# Patient Record
Sex: FEMALE | Race: WHITE | Employment: FULL TIME | URBAN - METROPOLITAN AREA
[De-identification: names, ages, dates, MRNs, and addresses within clinical notes are randomized per-mention and may not be internally consistent; named-entity substitution may affect disease eponyms.]

---

## 2017-11-18 PROBLEM — S41.112A LACERATION OF LEFT UPPER EXTREMITY: Status: ACTIVE | Noted: 2017-11-18

## 2017-11-18 PROBLEM — S40.022A CONTUSION OF MULTIPLE SITES OF LEFT SHOULDER AND UPPER ARM: Status: ACTIVE | Noted: 2017-11-18

## 2017-11-18 PROBLEM — S60.00XA CONTUSION OF FINGER OF LEFT HAND: Status: ACTIVE | Noted: 2017-11-18

## 2017-11-18 PROBLEM — S40.012A CONTUSION OF MULTIPLE SITES OF LEFT SHOULDER AND UPPER ARM: Status: ACTIVE | Noted: 2017-11-18

## 2018-08-15 ENCOUNTER — TELEPHONE (OUTPATIENT)
Dept: ORTHOPEDIC SURGERY | Age: 38
End: 2018-08-15

## 2018-08-15 DIAGNOSIS — R52 PAIN: Primary | ICD-10-CM

## 2018-08-16 ENCOUNTER — OFFICE VISIT (OUTPATIENT)
Dept: ORTHOPEDIC SURGERY | Age: 38
End: 2018-08-16
Payer: COMMERCIAL

## 2018-08-16 VITALS
TEMPERATURE: 98.6 F | HEART RATE: 88 BPM | BODY MASS INDEX: 23.04 KG/M2 | SYSTOLIC BLOOD PRESSURE: 123 MMHG | RESPIRATION RATE: 18 BRPM | HEIGHT: 63 IN | WEIGHT: 130 LBS | DIASTOLIC BLOOD PRESSURE: 87 MMHG

## 2018-08-16 DIAGNOSIS — M77.8 RIGHT SHOULDER TENDONITIS: ICD-10-CM

## 2018-08-16 DIAGNOSIS — G56.02 LEFT CARPAL TUNNEL SYNDROME: ICD-10-CM

## 2018-08-16 DIAGNOSIS — M67.911 BILATERAL SHOULDER TENDINOPATHY: ICD-10-CM

## 2018-08-16 DIAGNOSIS — R52 PAIN: Primary | ICD-10-CM

## 2018-08-16 DIAGNOSIS — M77.12 LATERAL EPICONDYLITIS OF LEFT ELBOW: ICD-10-CM

## 2018-08-16 DIAGNOSIS — G56.23 ULNAR NEUROPATHY OF BOTH UPPER EXTREMITIES: ICD-10-CM

## 2018-08-16 DIAGNOSIS — M67.912 BILATERAL SHOULDER TENDINOPATHY: ICD-10-CM

## 2018-08-16 PROCEDURE — 99214 OFFICE O/P EST MOD 30 MIN: CPT | Performed by: ORTHOPAEDIC SURGERY

## 2018-08-16 PROCEDURE — G8420 CALC BMI NORM PARAMETERS: HCPCS | Performed by: ORTHOPAEDIC SURGERY

## 2018-08-16 PROCEDURE — G8427 DOCREV CUR MEDS BY ELIG CLIN: HCPCS | Performed by: ORTHOPAEDIC SURGERY

## 2018-08-16 PROCEDURE — L3908 WHO COCK-UP NONMOLDE PRE OTS: HCPCS | Performed by: ORTHOPAEDIC SURGERY

## 2018-08-16 PROCEDURE — 4004F PT TOBACCO SCREEN RCVD TLK: CPT | Performed by: ORTHOPAEDIC SURGERY

## 2018-08-16 PROCEDURE — 20610 DRAIN/INJ JOINT/BURSA W/O US: CPT | Performed by: ORTHOPAEDIC SURGERY

## 2018-08-16 RX ORDER — TRIAMCINOLONE ACETONIDE 40 MG/ML
80 INJECTION, SUSPENSION INTRA-ARTICULAR; INTRAMUSCULAR ONCE
Status: COMPLETED | OUTPATIENT
Start: 2018-08-16 | End: 2018-08-16

## 2018-08-16 RX ORDER — LAMOTRIGINE 150 MG/1
150 TABLET ORAL DAILY
COMMUNITY
End: 2020-06-15

## 2018-08-16 RX ORDER — IBUPROFEN 800 MG/1
800 TABLET ORAL EVERY 6 HOURS PRN
Qty: 120 TABLET | Refills: 1 | Status: SHIPPED
Start: 2018-08-16 | End: 2020-07-21 | Stop reason: ALTCHOICE

## 2018-08-16 RX ORDER — IBUPROFEN 800 MG/1
800 TABLET ORAL EVERY 8 HOURS PRN
Qty: 21 TABLET | Refills: 0 | Status: SHIPPED | OUTPATIENT
Start: 2018-08-16 | End: 2018-08-16 | Stop reason: ALTCHOICE

## 2018-08-16 RX ADMIN — TRIAMCINOLONE ACETONIDE 80 MG: 40 INJECTION, SUSPENSION INTRA-ARTICULAR; INTRAMUSCULAR at 09:51

## 2018-08-16 NOTE — PROGRESS NOTES
she has been smoking Cigarettes. She has a 2.50 pack-year smoking history. She has never used smokeless tobacco.  ETOH:   reports that she does not drink alcohol. DRUGS:   reports that she does not use drugs. ACTIVITIES OF DAILY LIVING:    OCCUPATION:    Family History:   History reviewed. No pertinent family history. REVIEW OF SYSTEMS:  CONSTITUTIONAL:  negative  EYES:  negative  HEENT:  negative  RESPIRATORY:  negative  CARDIOVASCULAR:  negative  GASTROINTESTINAL:  negative  GENITOURINARY:  negative  INTEGUMENT/BREAST:  negative  HEMATOLOGIC/LYMPHATIC:  negative  ALLERGIC/IMMUNOLOGIC:  negative  ENDOCRINE:  negative  MUSCULOSKELETAL:  negative  NEUROLOGICAL:  negative  BEHAVIOR/PSYCH:  negative    PHYSICAL EXAM:    VITALS:  /87 (Site: Left Arm, Position: Sitting)   Pulse 88   Temp 98.6 °F (37 °C) (Oral)   Resp 18   Ht 5' 3\" (1.6 m)   Wt 130 lb (59 kg)   BMI 23.03 kg/m²   CONSTITUTIONAL:  awake, alert, cooperative, no apparent distress, and appears stated age  EYES:  Lids and lashes normal, pupils equal, round and reactive to light, extra ocular muscles intact, sclera clear, conjunctiva normal  ENT:  Normocephalic, without obvious abnormality, atraumatic, sinuses nontender on palpation, external ears without lesions, oral pharynx with moist mucus membranes, tonsils without erythema or exudates, gums normal and good dentition. NECK:  Supple, symmetrical, trachea midline, no adenopathy, thyroid symmetric, not enlarged and no tenderness, skin normal  NEUROLOGIC:  Awake, alert, oriented to name, place and time. Cranial nerves II-XII are grossly intact. Motor is 5 out of 5 bilaterally. Sensory is intact. gait is normal.  MUSCULOSKELETAL:    Right upper extremity: Well-healed scars of the shoulder. Forward elevation 140°. Abduction 100°. External rotation 30°. Internal rotation to the lower lumbar spine. Positive tenderness over the anterior aspect shoulder. Mild crepitance anteriorly.  Well-healed in the office. AP, lateral, obliques were obtained. Negative for acute fracture dislocations. Soft tissues within normal limits. Impression office x-rays bilateral hands: Negative for acute fracture dislocations. IMPRESSION:  · Persistent and recalcitrant right shoulder pain worsening recently with stiffness status post rotator cuff repair ×2 and manipulation shoulder  · Persistent bilateral hand numbness and tingling system with ulnar neuropathy bilateral as well as probable left carpal tunnel syndrome  · Left elbow lateral Epicondylitis    PLAN:  Today's findings were explained to patient. She consented to injection of the right shoulder which is well-tolerated. She is also referred to therapy for the shoulder and left elbow. Cockup wrist brace was fitted. Repeat EMG nerve nurses reported the bilateral upper shoulders. Procedure Note Cortisone Injection to Shoulder    The right shoulder was identified as the injection site. The risk and benefits of a cortisone injection were explained and the patient consented to the injection. Under sterile conditions, the shoulder SAS was injected with a mixture of 80mg of Kenelog and 4 mL of 5% Ropivacaine and 4 mL of 1% Lidocaine without complication. A sterile bandage was applied.

## 2018-08-20 ENCOUNTER — HOSPITAL ENCOUNTER (OUTPATIENT)
Dept: PHYSICAL THERAPY | Age: 38
Setting detail: THERAPIES SERIES
Discharge: HOME | End: 2018-08-20
Payer: COMMERCIAL

## 2018-08-20 PROCEDURE — G8981 BODY POS CURRENT STATUS: HCPCS

## 2018-08-20 PROCEDURE — G8982 BODY POS GOAL STATUS: HCPCS

## 2018-08-20 PROCEDURE — 97162 PT EVAL MOD COMPLEX 30 MIN: CPT

## 2018-08-23 ENCOUNTER — HOSPITAL ENCOUNTER (OUTPATIENT)
Dept: PHYSICAL THERAPY | Age: 38
Setting detail: THERAPIES SERIES
Discharge: HOME | End: 2018-08-23
Payer: COMMERCIAL

## 2018-08-23 PROCEDURE — G0283 ELEC STIM OTHER THAN WOUND: HCPCS

## 2018-08-23 PROCEDURE — 97140 MANUAL THERAPY 1/> REGIONS: CPT

## 2018-08-27 NOTE — FLOWSHEET NOTE
[facility]  Phone: [phone] Fax: [phone]     Physical Therapy  Out Patient Initial Evaluation    Date:  2018     Patient Name:  Lianet Ceron    :  1980  MRN: [id number]    DIAGNOSIS:  Pain and Limited ROM Bilateral shoulders and left Elbow  EVALUATION DATE:  2018  REFERRING PHYSICIAN:  Dr Sosa Shamar:  2018     PROBLEMS FOUND DURING EVALUATION  · Pain: affects bilateral shoulders and left elbow. Pain intensity varies Left shoulder pain most intense and most frequent   · Altered sensation left UE  · Limited ROM/Mobility bilateral shoulder/scapular regions and cervical region  · Postural deficits/muscular imbalances     SHORT TERM GOALS  · Patient will report a consistent and sustained reduction in overall shoulder/elbow pain symptoms and will demonstrate improved ROM   · Establish HEP    LONG TERM GOALS  · Patient will be able to engage in normal ADL's while being able to effectively control shoulder/elbow pain at 3/10 or less  · Left UE Altered sensation will be minimal and will not affect ADL's   · AROM bilateral shoulder/scapular region will be WFL's   · Postural awareness Fair+ or better Muscular imbalances Minimal  · Patient will be able to maintain and self direct an appropriate follow up independent exercise program     PATIENT GOALS  · Regain functional mobility with effective pain control     REHAB POTENTIAL:  Fair/Good    FREQUENCY/DURATION:  2-3 times per week 5 weeks     PLAN OF CARE:  Modalities Manual therapy ROM Strengthening Postural education HEP       Thank you for the opportunity to work with your patient. If you have questions or comments, please feel free to contact me by phone or fax.       Electronically Signed by Leatha Atwood PT [id number]        ___________________________________  2018     Physician     Date

## 2018-08-28 ENCOUNTER — HOSPITAL ENCOUNTER (OUTPATIENT)
Dept: PHYSICAL THERAPY | Age: 38
Setting detail: THERAPIES SERIES
Discharge: HOME | End: 2018-08-28
Payer: COMMERCIAL

## 2018-08-28 PROCEDURE — 97110 THERAPEUTIC EXERCISES: CPT

## 2018-08-28 PROCEDURE — 97140 MANUAL THERAPY 1/> REGIONS: CPT

## 2018-08-28 NOTE — PROGRESS NOTES
[facility]  Phone: [phone] Fax: [phone]       Physical Therapy Daily Treatment Note  Date:  2018    Patient Name:  Patrick Allan    :  1980  MRN: [id number]    Restrictions/Precautions:    Diagnosis:  Bilateral Shoulder/Left Elbow pain   Treatment Diagnosis:    Insurance/Certification information:  [facility]   Referring Physician:  Dr Isaura Espinoza of [facility] signed (Y/N):    Visit# / total visits:  3/  Pain level: /10  Time In: 0800       Time Out: 08         Subjective:  Patient reports she has felt less of the acute tightness/restriction through the posterior cervical region and left posterior shoulder but that she still notes significant discomfort with ROM     Exercises:  Exercise/Equipment Resistance/Repetitions Other comments     UBE 6 min      Flex Band Self mob  10 reps bilateral      Doorway stretch   5 reps 2 sets      Wand stretches   10 reps                                                                                                                   Other Therapeutic Activities:      Home Exercise Program:      Manual Treatments:  Passive ROM with soft tissue mob posterior upper trap/scapular region     Modalities:  Interferential Estim left shoulder/scapular region 20 min with heat     Timed Code Treatment Minutes:  30    Total Treatment Minutes:  40    Treatment/Activity Tolerance:  [x] Patient tolerated treatment well [] Patient limited by fatigue  [] Patient limited by pain  [] Patient limited by other medical complications  [x] Other: Patient exhibited less guarding and tension in the left shoulder and cervical areas today but overall discomfort was somewhat increased with ROM     Prognosis: [] Good [x] Fair  [] Poor    Patient Requires Follow-up: [x] Yes  [] No    Plan:   [x] Continue per plan of care [] Alter current plan (see comments)  [] Plan of care initiated [] Hold pending MD visit [] Discharge  Plan for Next Session: Electronically signed by:  Brielle Pardo, [address] [facility]

## 2018-08-30 ENCOUNTER — HOSPITAL ENCOUNTER (OUTPATIENT)
Dept: PHYSICAL THERAPY | Age: 38
Setting detail: THERAPIES SERIES
Discharge: HOME | End: 2018-08-30
Payer: COMMERCIAL

## 2018-08-30 PROCEDURE — 97110 THERAPEUTIC EXERCISES: CPT

## 2018-09-04 ENCOUNTER — HOSPITAL ENCOUNTER (OUTPATIENT)
Dept: PHYSICAL THERAPY | Age: 38
Setting detail: THERAPIES SERIES
Discharge: HOME | End: 2018-09-04
Payer: COMMERCIAL

## 2018-09-04 PROCEDURE — 97110 THERAPEUTIC EXERCISES: CPT

## 2018-09-04 NOTE — PROGRESS NOTES
[facility]  Phone: [phone] Fax: [phone]       Physical Therapy Daily Treatment Note  Date:  2018    Patient Name:  Debbie Bedolla    :  1980  MRN: [id number]    Restrictions/Precautions:    Diagnosis:  Bilateral Shoulder/Left Elbow pain   Treatment Diagnosis:    Insurance/Certification information:  [facility]   Referring Physician:  Dr Brown [facility] of care signed (Y/N):    Visit# / total visits:  5/  Pain level: /10  Time In: 0800       Time Out: 08        Subjective:  Patient reports she \"took it easy\" for most of the long weekend and overall pain and stiffness seem improved today     Exercises:  Exercise/Equipment Resistance/Repetitions Other comments     UBE 6 min      Flex Band Self mob  10 reps bilateral      Doorway stretch   5 reps 2 sets      Wand stretches   10 reps                                                                                                                   Other Therapeutic Activities:      Home Exercise Program:      Manual Treatments:  Passive ROM with soft tissue mob posterior upper trap/scapular region     Modalities:       Timed Code Treatment Minutes:  30    Total Treatment Minutes:  40    Treatment/Activity Tolerance:  [x] Patient tolerated treatment well [] Patient limited by fatigue  [] Patient limited by pain  [] Patient limited by other medical complications  [x] Other:General ROM was better today Patient noted her neck, proximal shoulder and [address] region were all more mobile with exercises today     Prognosis: [] Good [x] Fair  [] Poor    Patient Requires Follow-up: [x] Yes  [] No    Plan:   [x] Continue per plan of care [] Alter current plan (see comments)  [] Plan of care initiated [] Hold pending MD visit [] Discharge  Plan for Next Session:         Electronically signed by:  Israel Mccracken, [address] [facility]

## 2018-09-06 ENCOUNTER — HOSPITAL ENCOUNTER (OUTPATIENT)
Dept: PHYSICAL THERAPY | Age: 38
Setting detail: THERAPIES SERIES
Discharge: HOME | End: 2018-09-06
Payer: COMMERCIAL

## 2018-09-06 PROCEDURE — 97110 THERAPEUTIC EXERCISES: CPT

## 2018-09-10 ENCOUNTER — HOSPITAL ENCOUNTER (OUTPATIENT)
Dept: PHYSICAL THERAPY | Age: 38
Setting detail: THERAPIES SERIES
Discharge: HOME | End: 2018-09-10
Payer: COMMERCIAL

## 2018-09-10 PROCEDURE — 97110 THERAPEUTIC EXERCISES: CPT

## 2018-09-10 NOTE — PROGRESS NOTES
[facility]  Phone: [phone] Fax: [phone]       Physical Therapy Daily Treatment Note  Date:  9/10/2018    Patient Name:  Valeriy Rogel    :  1980  MRN: [id number]    Restrictions/Precautions:    Diagnosis:  Bilateral Shoulder/Left Elbow pain   Treatment Diagnosis:    Insurance/Certification information:  [facility]   Referring Physician:  Dr Salena Yao of care signed (Y/N):    Visit# / total visits:  7/  Pain level: /10  Time In: 0755       Time Out: 0840        Subjective:  Patient reports shoulder pain has not increased over the weekend Patient feeling better about self management of symptoms     Exercises:  Exercise/Equipment Resistance/Repetitions Other comments     UBE 6 min      Flex Band Self mob  10 reps bilateral      Doorway stretch   5 reps 2 sets      Wand stretches   10 reps                                                                                                                   Other Therapeutic Activities:      Home Exercise Program:      Manual Treatments:  Passive ROM with soft tissue mob posterior upper trap/scapular region     Modalities:       Timed Code Treatment Minutes:  30    Total Treatment Minutes:  40    Treatment/Activity Tolerance:  [x] Patient tolerated treatment well [] Patient limited by fatigue  [] Patient limited by pain  [] Patient limited by other medical complications  [] Other:    Prognosis: [] Good [x] Fair  [] Poor    Patient Requires Follow-up: [x] Yes  [] No    Plan:   [x] Continue per plan of care [] Alter current plan (see comments)  [] Plan of care initiated [] Hold pending MD visit [] Discharge  Plan for Next Session:         Electronically signed by:  Keith De La O, [address] [place] [address]

## 2018-09-14 ENCOUNTER — HOSPITAL ENCOUNTER (OUTPATIENT)
Dept: PHYSICAL THERAPY | Age: 38
Setting detail: THERAPIES SERIES
Discharge: HOME | End: 2018-09-14
Payer: COMMERCIAL

## 2018-09-14 PROCEDURE — 97110 THERAPEUTIC EXERCISES: CPT

## 2018-09-14 NOTE — PROGRESS NOTES
[facility]  Phone: [phone] Fax: [phone]       Physical Therapy Daily Treatment Note  Date:  2018    Patient Name:  Magdy Mackey    :  1980  MRN: [id number]    Restrictions/Precautions:    Diagnosis:  Bilateral Shoulder/Left Elbow pain   Treatment Diagnosis:    Insurance/Certification information:  [facility]   Referring Physician:  Dr Li Eye of care signed (Y/N):    Visit# / total visits:  8/  Pain level: /10  Time In: 5430       Time Out: 0840        Subjective:      Exercises:  Exercise/Equipment Resistance/Repetitions Other comments     UBE 6 min      Flex Band Self mob  10 reps bilateral      Doorway stretch   5 reps 2 sets      Wand stretches   10 reps                                                                                                                   Other Therapeutic Activities:      Home Exercise Program:      Manual Treatments:  Passive ROM with soft tissue mob posterior upper trap/scapular region     Modalities:       Timed Code Treatment Minutes:  30    Total Treatment Minutes:  40    Treatment/Activity Tolerance:  [x] Patient tolerated treatment well [] Patient limited by fatigue  [] Patient limited by pain  [] Patient limited by other medical complications  [] Other:    Prognosis: [] Good [x] Fair  [] Poor    Patient Requires Follow-up: [x] Yes  [] No    Plan:   [x] Continue per plan of care [] Alter current plan (see comments)  [] Plan of care initiated [] Hold pending MD visit [] Discharge  Plan for Next Session:         Electronically signed by:  Chadd Locke, [address] [place] [address]

## 2018-09-17 ENCOUNTER — HOSPITAL ENCOUNTER (OUTPATIENT)
Dept: PHYSICAL THERAPY | Age: 38
Setting detail: THERAPIES SERIES
Discharge: HOME | End: 2018-09-17
Payer: COMMERCIAL

## 2018-09-17 PROCEDURE — 97110 THERAPEUTIC EXERCISES: CPT

## 2018-09-20 ENCOUNTER — HOSPITAL ENCOUNTER (OUTPATIENT)
Dept: PHYSICAL THERAPY | Age: 38
Setting detail: THERAPIES SERIES
End: 2018-09-20
Payer: COMMERCIAL

## 2018-09-22 ENCOUNTER — HOSPITAL ENCOUNTER (OUTPATIENT)
Dept: PHYSICAL THERAPY | Age: 38
Setting detail: THERAPIES SERIES
Discharge: HOME | End: 2018-09-22
Payer: COMMERCIAL

## 2018-09-22 PROCEDURE — 97110 THERAPEUTIC EXERCISES: CPT

## 2018-09-22 PROCEDURE — G8982 BODY POS GOAL STATUS: HCPCS

## 2018-09-22 PROCEDURE — G8981 BODY POS CURRENT STATUS: HCPCS

## 2018-09-24 ENCOUNTER — HOSPITAL ENCOUNTER (OUTPATIENT)
Dept: PHYSICAL THERAPY | Age: 38
Setting detail: THERAPIES SERIES
Discharge: HOME | End: 2018-09-24
Payer: COMMERCIAL

## 2018-09-24 PROCEDURE — 97110 THERAPEUTIC EXERCISES: CPT

## 2018-09-27 ENCOUNTER — HOSPITAL ENCOUNTER (OUTPATIENT)
Dept: PHYSICAL THERAPY | Age: 38
Setting detail: THERAPIES SERIES
Discharge: HOME | End: 2018-09-27
Payer: COMMERCIAL

## 2019-01-29 ENCOUNTER — HOSPITAL ENCOUNTER (EMERGENCY)
Age: 39
Discharge: HOME | End: 2019-01-30
Attending: EMERGENCY MEDICINE
Payer: COMMERCIAL

## 2019-01-29 ENCOUNTER — APPOINTMENT (OUTPATIENT)
Dept: CT IMAGING | Age: 39
End: 2019-01-29
Payer: COMMERCIAL

## 2019-01-29 DIAGNOSIS — N20.0 NEPHROLITHIASIS: Primary | ICD-10-CM

## 2019-01-29 DIAGNOSIS — R10.9 ABDOMINAL PAIN, UNSPECIFIED ABDOMINAL LOCATION: ICD-10-CM

## 2019-01-29 LAB
ALBUMIN SERPL-MCNC: 4.1 G/DL (ref 3.5–5.2)
ALP BLD-CCNC: 83 U/L (ref 35–104)
ALT SERPL-CCNC: 12 U/L (ref 0–32)
ANION GAP SERPL CALCULATED.3IONS-SCNC: 9 MMOL/L (ref 7–16)
AST SERPL-CCNC: 12 U/L (ref 0–31)
BACTERIA: ABNORMAL /HPF
BASOPHILS ABSOLUTE: 0.03 E9/L (ref 0–0.2)
BASOPHILS RELATIVE PERCENT: 0.4 % (ref 0–2)
BILIRUB SERPL-MCNC: <0.2 MG/DL (ref 0–1.2)
BILIRUBIN URINE: NEGATIVE
BLOOD, URINE: ABNORMAL
BUN BLDV-MCNC: 9 MG/DL (ref 6–20)
CALCIUM SERPL-MCNC: 8.9 MG/DL (ref 8.6–10.2)
CHLORIDE BLD-SCNC: 101 MMOL/L (ref 98–107)
CLARITY: CLEAR
CO2: 28 MMOL/L (ref 22–29)
COLOR: ABNORMAL
CREAT SERPL-MCNC: 0.7 MG/DL (ref 0.5–1)
EOSINOPHILS ABSOLUTE: 0.08 E9/L (ref 0.05–0.5)
EOSINOPHILS RELATIVE PERCENT: 1.1 % (ref 0–6)
GFR AFRICAN AMERICAN: >60
GFR NON-AFRICAN AMERICAN: >60 ML/MIN/1.73
GLUCOSE BLD-MCNC: 74 MG/DL (ref 74–99)
GLUCOSE URINE: NEGATIVE MG/DL
HCG, URINE, POC: NEGATIVE
HCT VFR BLD CALC: 38.3 % (ref 34–48)
HEMOGLOBIN: 12.6 G/DL (ref 11.5–15.5)
IMMATURE GRANULOCYTES #: 0.02 E9/L
IMMATURE GRANULOCYTES %: 0.3 % (ref 0–5)
KETONES, URINE: NEGATIVE MG/DL
LACTIC ACID: 0.8 MMOL/L (ref 0.5–2.2)
LEUKOCYTE ESTERASE, URINE: NEGATIVE
LIPASE: 19 U/L (ref 13–60)
LYMPHOCYTES ABSOLUTE: 3.24 E9/L (ref 1.5–4)
LYMPHOCYTES RELATIVE PERCENT: 46.2 % (ref 20–42)
Lab: NORMAL
MCH RBC QN AUTO: 30 PG (ref 26–35)
MCHC RBC AUTO-ENTMCNC: 32.9 % (ref 32–34.5)
MCV RBC AUTO: 91.2 FL (ref 80–99.9)
MONOCYTES ABSOLUTE: 0.57 E9/L (ref 0.1–0.95)
MONOCYTES RELATIVE PERCENT: 8.1 % (ref 2–12)
NEGATIVE QC PASS/FAIL: NORMAL
NEUTROPHILS ABSOLUTE: 3.07 E9/L (ref 1.8–7.3)
NEUTROPHILS RELATIVE PERCENT: 43.9 % (ref 43–80)
NITRITE, URINE: NEGATIVE
PDW BLD-RTO: 12.8 FL (ref 11.5–15)
PH UA: 6.5 (ref 5–9)
PLATELET # BLD: 325 E9/L (ref 130–450)
PMV BLD AUTO: 10.3 FL (ref 7–12)
POSITIVE QC PASS/FAIL: NORMAL
POTASSIUM SERPL-SCNC: 3.8 MMOL/L (ref 3.5–5)
PROTEIN UA: NEGATIVE MG/DL
RBC # BLD: 4.2 E12/L (ref 3.5–5.5)
RBC UA: ABNORMAL /HPF (ref 0–2)
SODIUM BLD-SCNC: 138 MMOL/L (ref 132–146)
SPECIFIC GRAVITY UA: <=1.005 (ref 1–1.03)
TOTAL PROTEIN: 6.9 G/DL (ref 6.4–8.3)
UROBILINOGEN, URINE: 0.2 E.U./DL
WBC # BLD: 7 E9/L (ref 4.5–11.5)
WBC UA: ABNORMAL /HPF (ref 0–5)

## 2019-01-29 PROCEDURE — 85025 COMPLETE CBC W/AUTO DIFF WBC: CPT

## 2019-01-29 PROCEDURE — 96375 TX/PRO/DX INJ NEW DRUG ADDON: CPT

## 2019-01-29 PROCEDURE — 83605 ASSAY OF LACTIC ACID: CPT

## 2019-01-29 PROCEDURE — 2580000003 HC RX 258: Performed by: EMERGENCY MEDICINE

## 2019-01-29 PROCEDURE — 36415 COLL VENOUS BLD VENIPUNCTURE: CPT

## 2019-01-29 PROCEDURE — 81001 URINALYSIS AUTO W/SCOPE: CPT

## 2019-01-29 PROCEDURE — 87088 URINE BACTERIA CULTURE: CPT

## 2019-01-29 PROCEDURE — 99284 EMERGENCY DEPT VISIT MOD MDM: CPT

## 2019-01-29 PROCEDURE — 6360000002 HC RX W HCPCS: Performed by: EMERGENCY MEDICINE

## 2019-01-29 PROCEDURE — 80053 COMPREHEN METABOLIC PANEL: CPT

## 2019-01-29 PROCEDURE — 74176 CT ABD & PELVIS W/O CONTRAST: CPT

## 2019-01-29 PROCEDURE — 83690 ASSAY OF LIPASE: CPT

## 2019-01-29 PROCEDURE — 96374 THER/PROPH/DIAG INJ IV PUSH: CPT

## 2019-01-29 RX ORDER — METOCLOPRAMIDE HYDROCHLORIDE 5 MG/ML
10 INJECTION INTRAMUSCULAR; INTRAVENOUS ONCE
Status: COMPLETED | OUTPATIENT
Start: 2019-01-29 | End: 2019-01-29

## 2019-01-29 RX ORDER — 0.9 % SODIUM CHLORIDE 0.9 %
500 INTRAVENOUS SOLUTION INTRAVENOUS ONCE
Status: COMPLETED | OUTPATIENT
Start: 2019-01-29 | End: 2019-01-29

## 2019-01-29 RX ORDER — MORPHINE SULFATE 2 MG/ML
4 INJECTION, SOLUTION INTRAMUSCULAR; INTRAVENOUS ONCE
Status: COMPLETED | OUTPATIENT
Start: 2019-01-29 | End: 2019-01-29

## 2019-01-29 RX ADMIN — SODIUM CHLORIDE 500 ML: 9 INJECTION, SOLUTION INTRAVENOUS at 21:59

## 2019-01-29 RX ADMIN — METOCLOPRAMIDE 10 MG: 5 INJECTION, SOLUTION INTRAMUSCULAR; INTRAVENOUS at 21:59

## 2019-01-29 RX ADMIN — MORPHINE SULFATE 4 MG: 2 INJECTION, SOLUTION INTRAMUSCULAR; INTRAVENOUS at 22:00

## 2019-01-29 ASSESSMENT — ENCOUNTER SYMPTOMS
BACK PAIN: 0
NAUSEA: 1
SHORTNESS OF BREATH: 0
RHINORRHEA: 0
CONSTIPATION: 0
EYE PAIN: 0
HEMATEMESIS: 0
SORE THROAT: 0
EYE REDNESS: 0
DIARRHEA: 0
HEMATOCHEZIA: 0
ABDOMINAL DISTENTION: 0
EYE DISCHARGE: 0
COUGH: 0
WHEEZING: 0
VOMITING: 0
BLOOD IN STOOL: 0
SINUS PRESSURE: 0
ABDOMINAL PAIN: 1

## 2019-01-29 ASSESSMENT — PAIN SCALES - GENERAL
PAINLEVEL_OUTOF10: 10
PAINLEVEL_OUTOF10: 10

## 2019-01-30 VITALS
RESPIRATION RATE: 18 BRPM | WEIGHT: 130 LBS | TEMPERATURE: 97.5 F | HEART RATE: 66 BPM | SYSTOLIC BLOOD PRESSURE: 105 MMHG | HEIGHT: 63 IN | DIASTOLIC BLOOD PRESSURE: 57 MMHG | OXYGEN SATURATION: 96 % | BODY MASS INDEX: 23.04 KG/M2

## 2019-01-30 RX ORDER — TAMSULOSIN HYDROCHLORIDE 0.4 MG/1
0.4 CAPSULE ORAL DAILY
Qty: 14 CAPSULE | Refills: 0 | Status: SHIPPED | OUTPATIENT
Start: 2019-01-30 | End: 2020-06-15

## 2019-01-30 RX ORDER — ONDANSETRON 4 MG/1
4 TABLET, FILM COATED ORAL EVERY 8 HOURS PRN
Qty: 20 TABLET | Refills: 0 | Status: SHIPPED | OUTPATIENT
Start: 2019-01-30 | End: 2020-06-15

## 2019-01-30 RX ORDER — HYDROCODONE BITARTRATE AND ACETAMINOPHEN 5; 325 MG/1; MG/1
1 TABLET ORAL EVERY 6 HOURS PRN
Qty: 12 TABLET | Refills: 0 | Status: SHIPPED | OUTPATIENT
Start: 2019-01-30 | End: 2019-02-02

## 2019-02-01 LAB — URINE CULTURE, ROUTINE: NORMAL

## 2019-05-08 ENCOUNTER — APPOINTMENT (OUTPATIENT)
Dept: CT IMAGING | Age: 39
End: 2019-05-08
Payer: COMMERCIAL

## 2019-05-08 ENCOUNTER — HOSPITAL ENCOUNTER (EMERGENCY)
Age: 39
Discharge: HOME | End: 2019-05-08
Attending: EMERGENCY MEDICINE
Payer: COMMERCIAL

## 2019-05-08 VITALS
DIASTOLIC BLOOD PRESSURE: 77 MMHG | OXYGEN SATURATION: 98 % | HEIGHT: 63 IN | RESPIRATION RATE: 16 BRPM | WEIGHT: 130 LBS | SYSTOLIC BLOOD PRESSURE: 136 MMHG | HEART RATE: 70 BPM | BODY MASS INDEX: 23.04 KG/M2 | TEMPERATURE: 98.2 F

## 2019-05-08 DIAGNOSIS — N30.01 ACUTE CYSTITIS WITH HEMATURIA: Primary | ICD-10-CM

## 2019-05-08 DIAGNOSIS — K59.09 OTHER CONSTIPATION: ICD-10-CM

## 2019-05-08 LAB
ALBUMIN SERPL-MCNC: 3.9 G/DL (ref 3.5–5.2)
ALP BLD-CCNC: 53 U/L (ref 35–104)
ALT SERPL-CCNC: 11 U/L (ref 0–32)
ANION GAP SERPL CALCULATED.3IONS-SCNC: 8 MMOL/L (ref 7–16)
AST SERPL-CCNC: 15 U/L (ref 0–31)
BACTERIA: ABNORMAL /HPF
BASOPHILS ABSOLUTE: 0.03 E9/L (ref 0–0.2)
BASOPHILS RELATIVE PERCENT: 0.6 % (ref 0–2)
BILIRUB SERPL-MCNC: 0.2 MG/DL (ref 0–1.2)
BILIRUBIN URINE: NEGATIVE
BLOOD, URINE: ABNORMAL
BUN BLDV-MCNC: 9 MG/DL (ref 6–20)
CALCIUM SERPL-MCNC: 8.4 MG/DL (ref 8.6–10.2)
CHLORIDE BLD-SCNC: 102 MMOL/L (ref 98–107)
CLARITY: ABNORMAL
CO2: 27 MMOL/L (ref 22–29)
COLOR: YELLOW
CREAT SERPL-MCNC: 0.7 MG/DL (ref 0.5–1)
EOSINOPHILS ABSOLUTE: 0.06 E9/L (ref 0.05–0.5)
EOSINOPHILS RELATIVE PERCENT: 1.1 % (ref 0–6)
EPITHELIAL CELLS, UA: ABNORMAL /HPF
GFR AFRICAN AMERICAN: >60
GFR NON-AFRICAN AMERICAN: >60 ML/MIN/1.73
GLUCOSE BLD-MCNC: 84 MG/DL (ref 74–99)
GLUCOSE URINE: NEGATIVE MG/DL
HCG, URINE, POC: NEGATIVE
HCT VFR BLD CALC: 35.7 % (ref 34–48)
HEMOGLOBIN: 11.3 G/DL (ref 11.5–15.5)
IMMATURE GRANULOCYTES #: 0 E9/L
IMMATURE GRANULOCYTES %: 0 % (ref 0–5)
KETONES, URINE: NEGATIVE MG/DL
LEUKOCYTE ESTERASE, URINE: ABNORMAL
LYMPHOCYTES ABSOLUTE: 2.46 E9/L (ref 1.5–4)
LYMPHOCYTES RELATIVE PERCENT: 46.9 % (ref 20–42)
Lab: NORMAL
MCH RBC QN AUTO: 29.1 PG (ref 26–35)
MCHC RBC AUTO-ENTMCNC: 31.7 % (ref 32–34.5)
MCV RBC AUTO: 92 FL (ref 80–99.9)
MONOCYTES ABSOLUTE: 0.48 E9/L (ref 0.1–0.95)
MONOCYTES RELATIVE PERCENT: 9.1 % (ref 2–12)
NEGATIVE QC PASS/FAIL: NORMAL
NEUTROPHILS ABSOLUTE: 2.22 E9/L (ref 1.8–7.3)
NEUTROPHILS RELATIVE PERCENT: 42.3 % (ref 43–80)
NITRITE, URINE: NEGATIVE
PDW BLD-RTO: 12.9 FL (ref 11.5–15)
PH UA: 8 (ref 5–9)
PLATELET # BLD: 232 E9/L (ref 130–450)
PMV BLD AUTO: 11.3 FL (ref 7–12)
POSITIVE QC PASS/FAIL: NORMAL
POTASSIUM REFLEX MAGNESIUM: 3.8 MMOL/L (ref 3.5–5)
PROTEIN UA: ABNORMAL MG/DL
RBC # BLD: 3.88 E12/L (ref 3.5–5.5)
RBC UA: >20 /HPF (ref 0–2)
SODIUM BLD-SCNC: 137 MMOL/L (ref 132–146)
SPECIFIC GRAVITY UA: 1.01 (ref 1–1.03)
TOTAL PROTEIN: 6 G/DL (ref 6.4–8.3)
UROBILINOGEN, URINE: 1 E.U./DL
WBC # BLD: 5.3 E9/L (ref 4.5–11.5)
WBC UA: >20 /HPF (ref 0–5)

## 2019-05-08 PROCEDURE — 80053 COMPREHEN METABOLIC PANEL: CPT

## 2019-05-08 PROCEDURE — 36415 COLL VENOUS BLD VENIPUNCTURE: CPT

## 2019-05-08 PROCEDURE — 74176 CT ABD & PELVIS W/O CONTRAST: CPT

## 2019-05-08 PROCEDURE — 99284 EMERGENCY DEPT VISIT MOD MDM: CPT

## 2019-05-08 PROCEDURE — 85025 COMPLETE CBC W/AUTO DIFF WBC: CPT

## 2019-05-08 PROCEDURE — 6370000000 HC RX 637 (ALT 250 FOR IP): Performed by: STUDENT IN AN ORGANIZED HEALTH CARE EDUCATION/TRAINING PROGRAM

## 2019-05-08 PROCEDURE — 2580000003 HC RX 258: Performed by: STUDENT IN AN ORGANIZED HEALTH CARE EDUCATION/TRAINING PROGRAM

## 2019-05-08 PROCEDURE — 6360000002 HC RX W HCPCS: Performed by: STUDENT IN AN ORGANIZED HEALTH CARE EDUCATION/TRAINING PROGRAM

## 2019-05-08 PROCEDURE — 96374 THER/PROPH/DIAG INJ IV PUSH: CPT

## 2019-05-08 PROCEDURE — 81001 URINALYSIS AUTO W/SCOPE: CPT

## 2019-05-08 PROCEDURE — 96375 TX/PRO/DX INJ NEW DRUG ADDON: CPT

## 2019-05-08 RX ORDER — CEFDINIR 300 MG/1
300 CAPSULE ORAL EVERY 12 HOURS SCHEDULED
Status: DISCONTINUED | OUTPATIENT
Start: 2019-05-08 | End: 2019-05-08

## 2019-05-08 RX ORDER — KETOROLAC TROMETHAMINE 30 MG/ML
15 INJECTION, SOLUTION INTRAMUSCULAR; INTRAVENOUS ONCE
Status: COMPLETED | OUTPATIENT
Start: 2019-05-08 | End: 2019-05-08

## 2019-05-08 RX ORDER — CEFDINIR 300 MG/1
300 CAPSULE ORAL 2 TIMES DAILY
Qty: 14 CAPSULE | Refills: 0 | Status: SHIPPED | OUTPATIENT
Start: 2019-05-08 | End: 2019-05-15

## 2019-05-08 RX ORDER — ONDANSETRON 2 MG/ML
4 INJECTION INTRAMUSCULAR; INTRAVENOUS ONCE
Status: COMPLETED | OUTPATIENT
Start: 2019-05-08 | End: 2019-05-08

## 2019-05-08 RX ORDER — 0.9 % SODIUM CHLORIDE 0.9 %
1000 INTRAVENOUS SOLUTION INTRAVENOUS ONCE
Status: COMPLETED | OUTPATIENT
Start: 2019-05-08 | End: 2019-05-08

## 2019-05-08 RX ORDER — HYDROCODONE BITARTRATE AND ACETAMINOPHEN 5; 325 MG/1; MG/1
1 TABLET ORAL EVERY 6 HOURS PRN
Qty: 12 TABLET | Refills: 0 | Status: SHIPPED | OUTPATIENT
Start: 2019-05-08 | End: 2019-05-08

## 2019-05-08 RX ORDER — CEFDINIR 300 MG/1
300 CAPSULE ORAL ONCE
Status: COMPLETED | OUTPATIENT
Start: 2019-05-08 | End: 2019-05-08

## 2019-05-08 RX ORDER — NAPROXEN 375 MG/1
375 TABLET ORAL 2 TIMES DAILY WITH MEALS
Qty: 12 TABLET | Refills: 0 | Status: SHIPPED | OUTPATIENT
Start: 2019-05-08 | End: 2020-06-15

## 2019-05-08 RX ADMIN — KETOROLAC TROMETHAMINE 15 MG: 30 INJECTION INTRAMUSCULAR; INTRAVENOUS at 05:19

## 2019-05-08 RX ADMIN — ONDANSETRON 4 MG: 2 INJECTION INTRAMUSCULAR; INTRAVENOUS at 05:19

## 2019-05-08 RX ADMIN — SODIUM CHLORIDE 1000 ML: 9 INJECTION, SOLUTION INTRAVENOUS at 05:19

## 2019-05-08 RX ADMIN — CEFDINIR 300 MG: 300 CAPSULE ORAL at 06:42

## 2019-05-08 ASSESSMENT — PAIN SCALES - GENERAL
PAINLEVEL_OUTOF10: 9
PAINLEVEL_OUTOF10: 9

## 2019-05-08 ASSESSMENT — ENCOUNTER SYMPTOMS
NAUSEA: 1
SHORTNESS OF BREATH: 0
ABDOMINAL PAIN: 1
DIARRHEA: 0
BACK PAIN: 0
VOMITING: 0

## 2019-05-08 ASSESSMENT — PAIN DESCRIPTION - PAIN TYPE: TYPE: ACUTE PAIN

## 2019-05-08 NOTE — ED PROVIDER NOTES
Milton Zee is a 42year old female who presents from home by private car for LLQ abdominal pain. Pain started suddenly, woke her from sleep, and radiates to her groin on the left side. Patient has a history of kidney stones. Patient's LMP was 4/22. Patient is not having any vaginal discharge or bleeding. Patient was unable to follow up for previous kidney stone due to daughter's illness. Patient is no having any diarrhea, fever, or chills. Patient has no history of previous surgery. The history is provided by the patient. Abdominal Pain   Pain location:  LLQ  Pain quality: sharp and shooting    Pain severity:  Severe  Onset quality:  Sudden  Duration:  2 hours  Timing:  Constant  Progression:  Worsening  Context: awakening from sleep    Context: not diet changes, not retching, not sick contacts and not trauma    Relieved by:  Nothing  Worsened by:  Nothing  Ineffective treatments:  None tried  Associated symptoms: nausea    Associated symptoms: no chest pain, no chills, no diarrhea, no dysuria, no fever, no hematuria, no shortness of breath, no vaginal bleeding, no vaginal discharge and no vomiting        Review of Systems   Constitutional: Negative for chills and fever. Respiratory: Negative for shortness of breath. Cardiovascular: Negative for chest pain. Gastrointestinal: Positive for abdominal pain and nausea. Negative for diarrhea and vomiting. Genitourinary: Negative for dysuria, hematuria, vaginal bleeding and vaginal discharge. Musculoskeletal: Negative for back pain. Neurological: Negative for headaches. Physical Exam   Constitutional: She is oriented to person, place, and time. She appears well-developed and well-nourished. HENT:   Head: Normocephalic and atraumatic. Eyes: Conjunctivae and EOM are normal. Right eye exhibits no discharge. Left eye exhibits no discharge. Cardiovascular: Normal rate and regular rhythm.    Pulmonary/Chest: Effort normal and breath sounds not on file. The patients home medications have been reviewed. Allergies: Patient has no known allergies.     -------------------------------------------------- RESULTS -------------------------------------------------  Labs:  Results for orders placed or performed during the hospital encounter of 05/08/19   CBC auto differential   Result Value Ref Range    WBC 5.3 4.5 - 11.5 E9/L    RBC 3.88 3.50 - 5.50 E12/L    Hemoglobin 11.3 (L) 11.5 - 15.5 g/dL    Hematocrit 35.7 34.0 - 48.0 %    MCV 92.0 80.0 - 99.9 fL    MCH 29.1 26.0 - 35.0 pg    MCHC 31.7 (L) 32.0 - 34.5 %    RDW 12.9 11.5 - 15.0 fL    Platelets 603 570 - 590 E9/L    MPV 11.3 7.0 - 12.0 fL    Neutrophils % 42.3 (L) 43.0 - 80.0 %    Immature Granulocytes % 0.0 0.0 - 5.0 %    Lymphocytes % 46.9 (H) 20.0 - 42.0 %    Monocytes % 9.1 2.0 - 12.0 %    Eosinophils % 1.1 0.0 - 6.0 %    Basophils % 0.6 0.0 - 2.0 %    Neutrophils # 2.22 1.80 - 7.30 E9/L    Immature Granulocytes # 0.00 E9/L    Lymphocytes # 2.46 1.50 - 4.00 E9/L    Monocytes # 0.48 0.10 - 0.95 E9/L    Eosinophils # 0.06 0.05 - 0.50 E9/L    Basophils # 0.03 0.00 - 0.20 E9/L   Comprehensive Metabolic Panel w/ Reflex to MG   Result Value Ref Range    Sodium 137 132 - 146 mmol/L    Potassium reflex Magnesium 3.8 3.5 - 5.0 mmol/L    Chloride 102 98 - 107 mmol/L    CO2 27 22 - 29 mmol/L    Anion Gap 8 7 - 16 mmol/L    Glucose 84 74 - 99 mg/dL    BUN 9 6 - 20 mg/dL    CREATININE 0.7 0.5 - 1.0 mg/dL    GFR Non-African American >60 >=60 mL/min/1.73    GFR African American >60     Calcium 8.4 (L) 8.6 - 10.2 mg/dL    Total Protein 6.0 (L) 6.4 - 8.3 g/dL    Alb 3.9 3.5 - 5.2 g/dL    Total Bilirubin 0.2 0.0 - 1.2 mg/dL    Alkaline Phosphatase 53 35 - 104 U/L    ALT 11 0 - 32 U/L    AST 15 0 - 31 U/L   Urinalysis with Microscopic   Result Value Ref Range    Color, UA Yellow Straw/Yellow    Clarity, UA CLOUDY (A) Clear    Glucose, Ur Negative Negative mg/dL    Bilirubin Urine Negative Negative    Ketones, Urine Negative Negative mg/dL    Specific Gravity, UA 1.015 1.005 - 1.030    Blood, Urine LARGE (A) Negative    pH, UA 8.0 5.0 - 9.0    Protein, UA TRACE Negative mg/dL    Urobilinogen, Urine 1.0 <2.0 E.U./dL    Nitrite, Urine Negative Negative    Leukocyte Esterase, Urine MODERATE (A) Negative    WBC, UA >20 0 - 5 /HPF    RBC, UA >20 0 - 2 /HPF    Epi Cells MODERATE /HPF    Bacteria, UA MODERATE (A) /HPF   POC Pregnancy Urine Qual   Result Value Ref Range    HCG, Urine, POC Negative Negative    Lot Number [id number]     Positive QC Pass/Fail Pass     Negative QC Pass/Fail Pass        Radiology:  CT ABDOMEN PELVIS WO CONTRAST   Final Result   1. Moderate stool throughout the colon without mucosal thickening concerning    for constipation. 2. No nephrolithiasis or obstructive uropathy. No perinephric stranding. 3. Normal appendix. 4. Additional nonacute/incidental findings as described above. This report has been electronically signed by Tamela Matt MD.          ------------------------- NURSING NOTES AND VITALS REVIEWED ---------------------------  Date / Time Roomed:  5/8/2019  4:50 AM  ED Bed Assignment:  25/25    The nursing notes within the ED encounter and vital signs as below have been reviewed. BP (!) 136/98   Pulse 74   Temp 98.2 °F (36.8 °C) (Oral)   Resp 16   Ht 5' 3\" (1.6 m)   Wt 130 lb (59 kg)   LMP 04/22/2019   SpO2 99%   BMI 23.03 kg/m²   Oxygen Saturation Interpretation: Normal      ------------------------------------------ PROGRESS NOTES ------------------------------------------  6:28 AM  I have spoken with the patient and discussed todays results, in addition to providing specific details for the plan of care and counseling regarding the diagnosis and prognosis. Their questions are answered at this time and they are agreeable with the plan. I discussed at length with them reasons for immediate return here for re evaluation.  They will followup with their and the on call primary care physician, general surgeon and orthopedic physician by calling their office tomorrow and on Monday.      --------------------------------- ADDITIONAL PROVIDER NOTES ---------------------------------  At this time the patient is without objective evidence of an acute process requiring hospitalization or inpatient management. They have remained hemodynamically stable throughout their entire ED visit and are stable for discharge with outpatient follow-up. The plan has been discussed in detail and they are aware of the specific conditions for emergent return, as well as the importance of follow-up. New Prescriptions    CEFDINIR (OMNICEF) 300 MG CAPSULE    Take 1 capsule by mouth 2 times daily for 7 days    NAPROXEN (NAPROSYN) 375 MG TABLET    Take 1 tablet by mouth 2 times daily (with meals)       Diagnosis:  1. Acute cystitis with hematuria    2. Other constipation        Disposition:  Patient's disposition: Discharge to home  Patient's condition is stable.               Tori Cordoba MD  05/08/19 [id number]

## 2019-07-14 ENCOUNTER — HOSPITAL (OUTPATIENT)
Dept: OTHER | Age: 39
End: 2019-07-14

## 2019-07-14 PROCEDURE — 99283 EMERGENCY DEPT VISIT LOW MDM: CPT | Performed by: NURSE PRACTITIONER

## 2020-01-08 ENCOUNTER — HOSPITAL ENCOUNTER (EMERGENCY)
Age: 40
Discharge: HOME | End: 2020-01-08
Payer: COMMERCIAL

## 2020-01-08 VITALS
TEMPERATURE: 98 F | WEIGHT: 140 LBS | DIASTOLIC BLOOD PRESSURE: 80 MMHG | OXYGEN SATURATION: 98 % | RESPIRATION RATE: 20 BRPM | HEART RATE: 70 BPM | BODY MASS INDEX: 24.8 KG/M2 | HEIGHT: 63 IN | SYSTOLIC BLOOD PRESSURE: 120 MMHG

## 2020-01-08 LAB — STREP GRP A PCR: NEGATIVE

## 2020-01-08 PROCEDURE — 87880 STREP A ASSAY W/OPTIC: CPT

## 2020-01-08 PROCEDURE — 99282 EMERGENCY DEPT VISIT SF MDM: CPT

## 2020-01-08 ASSESSMENT — PAIN DESCRIPTION - LOCATION: LOCATION: MOUTH

## 2020-01-08 ASSESSMENT — PAIN DESCRIPTION - DESCRIPTORS: DESCRIPTORS: SHARP

## 2020-01-08 ASSESSMENT — PAIN DESCRIPTION - PAIN TYPE: TYPE: ACUTE PAIN

## 2020-01-08 ASSESSMENT — PAIN SCALES - GENERAL: PAINLEVEL_OUTOF10: 8

## 2020-01-08 NOTE — ED PROVIDER NOTES
rebound or guarding  Extremities: Moves all extremities x 4. Warm and well perfused  Skin: warm and dry without rash  Neurologic: GCS 15,  Intact. No focal deficits  Psych: Normal Affect      ------------------------ ED COURSE/MEDICAL DECISION MAKING----------------------  Medications - No data to display      Medical Decision Making:    I really don't see any lesions today. Mild posterior pharynx erythema. Rapid strep negative. I did review all her pictures from past that she took at home. Some of them look like thrush. Plan Nystatin for home. F/U dentist or oral surgery. No evidence of obstruction or respiratory issues. I think most of those complaints are related to her anxiety       Counseling: The emergency provider has spoken with the patient and discussed todays results, in addition to providing specific details for the plan of care and counseling regarding the diagnosis and prognosis. Questions are answered at this time and they are agreeable with the plan.      ------------------------ IMPRESSION AND DISPOSITION -------------------------------    IMPRESSION  1.  Oral mucosal lesion        DISPOSITION  Disposition: Discharge to home  Patient condition is stable                   Chandler Valenzuela PA-C  01/08/20 [id number]       Chandler Valenzuela PA-C  01/08/20 1936

## 2020-06-04 ENCOUNTER — NURSE TRIAGE (OUTPATIENT)
Dept: OTHER | Facility: CLINIC | Age: 40
End: 2020-06-04

## 2020-06-04 ENCOUNTER — TELEPHONE (OUTPATIENT)
Dept: ADMINISTRATIVE | Age: 40
End: 2020-06-04

## 2020-06-04 NOTE — TELEPHONE ENCOUNTER
Nurse triage called pre service because pt has had pain and swelling in both hands and feet x 2 months. Pt hung up before nurse could transfer her to me. I tried to call pt to schedule appt, but got no answer.
communication should be directly with the patient.

## 2020-06-18 ENCOUNTER — APPOINTMENT (OUTPATIENT)
Dept: GENERAL RADIOLOGY | Age: 40
End: 2020-06-18
Payer: COMMERCIAL

## 2020-06-18 ENCOUNTER — HOSPITAL ENCOUNTER (EMERGENCY)
Age: 40
Discharge: HOME | End: 2020-06-18
Payer: COMMERCIAL

## 2020-06-18 VITALS
RESPIRATION RATE: 16 BRPM | BODY MASS INDEX: 24.8 KG/M2 | DIASTOLIC BLOOD PRESSURE: 80 MMHG | WEIGHT: 140 LBS | SYSTOLIC BLOOD PRESSURE: 128 MMHG | HEIGHT: 63 IN | OXYGEN SATURATION: 99 % | HEART RATE: 98 BPM | TEMPERATURE: 98.6 F

## 2020-06-18 PROCEDURE — 96372 THER/PROPH/DIAG INJ SC/IM: CPT

## 2020-06-18 PROCEDURE — 6360000002 HC RX W HCPCS: Performed by: NURSE PRACTITIONER

## 2020-06-18 PROCEDURE — 73564 X-RAY EXAM KNEE 4 OR MORE: CPT

## 2020-06-18 PROCEDURE — 99284 EMERGENCY DEPT VISIT MOD MDM: CPT

## 2020-06-18 RX ORDER — KETOROLAC TROMETHAMINE 30 MG/ML
30 INJECTION, SOLUTION INTRAMUSCULAR; INTRAVENOUS ONCE
Status: COMPLETED | OUTPATIENT
Start: 2020-06-18 | End: 2020-06-18

## 2020-06-18 RX ADMIN — KETOROLAC TROMETHAMINE 30 MG: 30 INJECTION, SOLUTION INTRAMUSCULAR at 11:07

## 2020-06-18 ASSESSMENT — PAIN SCALES - GENERAL
PAINLEVEL_OUTOF10: 10
PAINLEVEL_OUTOF10: 10

## 2020-06-18 ASSESSMENT — PAIN DESCRIPTION - ORIENTATION: ORIENTATION: LEFT

## 2020-06-18 ASSESSMENT — PAIN DESCRIPTION - LOCATION: LOCATION: KNEE;LEG

## 2020-06-18 ASSESSMENT — PAIN DESCRIPTION - PAIN TYPE: TYPE: ACUTE PAIN

## 2020-06-18 NOTE — ED PROVIDER NOTES
MANIPULATION & ARTHROSCOPY    ROTATOR CUFF REPAIR  6/10/15    right rotator cuff open revision    ROTATOR CUFF REPAIR  4/2015   Social History:  reports that she has been smoking cigarettes. She has a 2.50 pack-year smoking history. She has never used smokeless tobacco. She reports that she does not drink alcohol or use drugs. Family History: family history is not on file. Allergies: Patient has no known allergies. Physical Exam          ED Triage Vitals   BP Temp Temp Source Pulse Resp SpO2 Height Weight   06/18/20 1046 06/18/20 1015 06/18/20 1047 06/18/20 1015 06/18/20 1015 06/18/20 1015 06/18/20 1030 06/18/20 1030   128/80 98.6 °F (37 °C) Oral 100 16 98 % 5' 3\" (1.6 m) 140 lb (63.5 kg)       Oxygen Saturation Interpretation: Normal.    Constitutional:  Alert, development consistent with age. Neck:  Normal ROM. Supple. Knee:  Left lateral:             Tenderness: Moderate to the lateral aspect. No  Calf tenderness. Swelling/Effusion: None. Deformity: no.              ROM: diminished range with pain. Skin:  no erythema, rash or wounds noted. Drawer's:  Unable to Assess due to pain. Lachman's: Unable to Assess. Apley's: Unable to Assess. Rodo's: Unable to Assess. Valgus/Varus Stress: Unable to Assess. Crepitus: Unable to Assess. Hip:            Tenderness:  none. Swelling: None. Deformity: no.              ROM: full range of motion. Skin:  no erythema, rash or wounds noted. Joint(s) Below: ankle. Tenderness:  none. Swelling: No.              Deformity: no.             ROM: full range of motion. Skin:  no erythema, rash or wounds noted. Neurovascular: Motor deficit: limited due to pain refusing to move the knee. Sensory deficit: none; full sensation intact to light touch in distal toes.              Pulse deficit: none; 2+ pedal and posterior tibial pulses intact. Capillary refill: normal; capillary refill less than 3 seconds in distal toes. Gait:  limp. Lymphatics: No lymphangitis or adenopathy noted. Neurological:  Oriented. Motor functions intact. Lab / Imaging Results   (All laboratory and radiology results have been personally reviewed by myself)  Labs:  No results found for this visit on 06/18/20. Imaging: All Radiology results interpreted by Radiologist unless otherwise noted. XR KNEE LEFT (MIN 4 VIEWS)    (Results Pending)     ED Course / Medical Decision Making     Medications   ketorolac (TORADOL) injection 30 mg (30 mg Intramuscular Given 6/18/20 1107)       Re evaluation: 2813 Discussed xray and follow up and pain did improve mildly. Consult(s):   None    Procedure(s):   none. Medical Decision Making:    Films were obtained based on negative suspicion for bony injury as per history/physical findings . Plan is subsequently for symptom control, limited use and  immobilization with appropriate outpatient follow-up her PCP and orthopedic surgeon whoever she can get into first she may need an MRI to determine if she has any ligament injury she will be given crutches instructed on rest ice elevation compression she did not want a knee brace due to increased pain with straightening. Advised on signs and symptoms warranting immediate return she will be given a short course of NSAIDs for discharge. Counseling: The emergency provider has spoken with the patient and discussed todays results, in addition to providing specific details for the plan of care and counseling regarding the diagnosis and prognosis. Questions are answered at this time and they are agreeable with the plan. Assessment      1. Sprain of left knee, unspecified ligament, initial encounter    2.  Fall on steps, initial encounter      Plan   Discharge to home  Patient condition is good    New Medications

## 2020-06-19 NOTE — ED NOTES
ATTENDING PROVIDER ATTESTATION:     Supervising Physician, on-site, available for consultation, non-participatory in the evaluation or care of this patient     Cooper Hobson DO  06/19/20 1881

## 2020-06-24 ENCOUNTER — OFFICE VISIT (OUTPATIENT)
Dept: ORTHOPEDIC SURGERY | Age: 40
End: 2020-06-24
Payer: COMMERCIAL

## 2020-06-24 VITALS — HEIGHT: 63 IN | TEMPERATURE: 98 F | WEIGHT: 135 LBS | BODY MASS INDEX: 23.92 KG/M2

## 2020-06-24 PROCEDURE — 99203 OFFICE O/P NEW LOW 30 MIN: CPT | Performed by: ORTHOPAEDIC SURGERY

## 2020-06-24 PROCEDURE — G8427 DOCREV CUR MEDS BY ELIG CLIN: HCPCS | Performed by: ORTHOPAEDIC SURGERY

## 2020-06-24 PROCEDURE — G8420 CALC BMI NORM PARAMETERS: HCPCS | Performed by: ORTHOPAEDIC SURGERY

## 2020-06-24 PROCEDURE — 4004F PT TOBACCO SCREEN RCVD TLK: CPT | Performed by: ORTHOPAEDIC SURGERY

## 2020-06-24 NOTE — PROGRESS NOTES
New Knee Patient     Referring Provider:   ED 6/18/2020    CHIEF COMPLAINT:   Chief Complaint   Patient presents with    ED Follow-up     6/18/2020 : Sprain of left knee , fall on stairs    Knee Pain     LT knee, she states that she was running up the stairs and felt tear, posterior aspect; she has been using crutches to get around; unable to bear weight on leg, states it wants to give out on her    Results     Lt knee XR in Epic    X-ray      3V XR obtained        HPI:    Billie Guthrie is a 47y.o. year old female who is seen today  for evaluation of left knee pain. She reports the pain has been ongoing for the past 2 weeks. She does recall a specific injury which started the pain. Two weeks ago running up steps, tripped and knee with twisting mostion. She states she heard and felt a pop at the time of injury. She has been ambulating at times with crutches. She reports the pain is worsening since injury. The patient does not have mechanical symptoms. She denies a feeling of instability. The patient is not working. Has history of ACL reconstruction about 15 to 20 years ago from which she did well. PAST MEDICAL HISTORY  Past Medical History:   Diagnosis Date    Depression     controlled with med, per patient    Impingement syndrome of right shoulder     PONV (postoperative nausea and vomiting)     Stiffness of right shoulder joint        PAST SURGICAL HISTORY  Past Surgical History:   Procedure Laterality Date    ANTERIOR CRUCIATE LIGAMENT REPAIR Left 1998    CARPAL TUNNEL RELEASE Right 4/2015    DILATION AND CURETTAGE OF UTERUS  2012    OTHER SURGICAL HISTORY Right 11/18/15    RIGHT SHOULDER MANIPULATION & ARTHROSCOPY    ROTATOR CUFF REPAIR  6/10/15    right rotator cuff open revision    ROTATOR CUFF REPAIR  4/2015         FAMILY HISTORY   History reviewed. No pertinent family history.     SOCIAL HISTORY  Social History     Socioeconomic History    Marital status:      Spouse name: Not on file    Number of children: Not on file    Years of education: Not on file    Highest education level: Not on file   Occupational History    Not on file   Social Needs    Financial resource strain: Not on file    Food insecurity     Worry: Not on file     Inability: Not on file    Transportation needs     Medical: Not on file     Non-medical: Not on file   Tobacco Use    Smoking status: Current Every Day Smoker     Packs/day: 0.50     Years: 5.00     Pack years: 2.50     Types: Cigarettes    Smokeless tobacco: Never Used   Substance and Sexual Activity    Alcohol use: No    Drug use: No    Sexual activity: Not on file   Lifestyle    Physical activity     Days per week: Not on file     Minutes per session: Not on file    Stress: Not on file   Relationships    Social connections     Talks on phone: Not on file     Gets together: Not on file     Attends Taoism service: Not on file     Active member of club or organization: Not on file     Attends meetings of clubs or organizations: Not on file     Relationship status: Not on file    Intimate partner violence     Fear of current or ex partner: Not on file     Emotionally abused: Not on file     Physically abused: Not on file     Forced sexual activity: Not on file   Other Topics Concern    Not on file   Social History Narrative    Not on file     Social History     Occupational History    Not on file   Tobacco Use    Smoking status: Current Every Day Smoker     Packs/day: 0.50     Years: 5.00     Pack years: 2.50     Types: Cigarettes    Smokeless tobacco: Never Used   Substance and Sexual Activity    Alcohol use: No    Drug use: No    Sexual activity: Not on file       CURRENT MEDICATIONS     Current Outpatient Medications:     amphetamine-dextroamphetamine (ADDERALL) 20 MG tablet, , Disp: , Rfl:     ARIPiprazole (ABILIFY) 5 MG tablet, , Disp: , Rfl:     lamoTRIgine (LAMICTAL) 200 MG tablet, , Disp: , Rfl:     VYVANSE 70 MG

## 2020-07-10 ENCOUNTER — HOSPITAL ENCOUNTER (OUTPATIENT)
Dept: MRI IMAGING | Age: 40
Discharge: HOME | End: 2020-07-12
Payer: COMMERCIAL

## 2020-07-10 PROCEDURE — 73721 MRI JNT OF LWR EXTRE W/O DYE: CPT

## 2020-07-13 ENCOUNTER — OFFICE VISIT (OUTPATIENT)
Dept: ORTHOPEDIC SURGERY | Age: 40
End: 2020-07-13
Payer: COMMERCIAL

## 2020-07-13 VITALS — BODY MASS INDEX: 23.92 KG/M2 | HEIGHT: 63 IN | WEIGHT: 135 LBS | TEMPERATURE: 98.1 F

## 2020-07-13 PROCEDURE — 99213 OFFICE O/P EST LOW 20 MIN: CPT | Performed by: ORTHOPAEDIC SURGERY

## 2020-07-13 PROCEDURE — 4004F PT TOBACCO SCREEN RCVD TLK: CPT | Performed by: ORTHOPAEDIC SURGERY

## 2020-07-13 PROCEDURE — G8427 DOCREV CUR MEDS BY ELIG CLIN: HCPCS | Performed by: ORTHOPAEDIC SURGERY

## 2020-07-13 PROCEDURE — G8420 CALC BMI NORM PARAMETERS: HCPCS | Performed by: ORTHOPAEDIC SURGERY

## 2020-07-13 NOTE — PROGRESS NOTES
Follow Up Visit     Stacey Moore returns today for follow up visit regarding Left knee injury, history of ACL reconstruction. Treatment thus far has included further imaging, MRI. She is here today for MRI results review. She reports symptoms are unchanged. Physical Exam:     Height: 5' 3\" (1.6 m), Weight: 135 lb (61.2 kg) (per pt)    General: Alert and oriented x3, no acute distress  Cardiovascular/pulmonary: No labored breathing, peripheral perfusion intact  Musculoskeletal:    On exam of the knee, she continues to have significant guarding with exam.  She has tenderness medially as well as positive Rodo's. There is trace effusion. There is medial joint line tenderness. Collateral ligaments and posterior cruciate are stable. I do appreciate endpoint with ACL testing although again exam is limited    Controlled Substances Monitoring:      Imaging:  MRI was reviewed. This shows what appears to be a root tear involving the medial meniscus. There is signal within the ACL but it does appear to be intact. I do not appreciate any significant articular cartilage abnormality. There is evidence of MCL sprain but ligament is intact. Screws are visualized in the femur and tibia      Assessment: Left knee medial meniscal root tear, MCL sprain, ACL sprain with history of ACL reconstruction      Plan:   We discussed her knee today. I feel her ACL is likely stable. I think her main issue is probably a new meniscus tear which appears to be near the root. We discussed continued conservative treatment versus operative management. She would like to proceed with surgery. Surgery will involve left knee arthroscopy, medial meniscus root repair, possible revision ACL reconstruction, possible staging with bone grafting. We discussed risks of surgery in detail today including but not limited to infection, neurovascular injury, persistent instability, persistent pain, need for other procedures.   She understands and would like to proceed    Weston Gomes MD  Orthopaedic Surgery   7/13/20  2:55 PM

## 2020-07-17 ENCOUNTER — HOSPITAL ENCOUNTER (OUTPATIENT)
Age: 40
Discharge: HOME | End: 2020-07-19
Payer: COMMERCIAL

## 2020-07-17 PROCEDURE — U0003 INFECTIOUS AGENT DETECTION BY NUCLEIC ACID (DNA OR RNA); SEVERE ACUTE RESPIRATORY SYNDROME CORONAVIRUS 2 (SARS-COV-2) (CORONAVIRUS DISEASE [COVID-19]), AMPLIFIED PROBE TECHNIQUE, MAKING USE OF HIGH THROUGHPUT TECHNOLOGIES AS DESCRIBED BY CMS-2020-01-R: HCPCS

## 2020-07-20 ENCOUNTER — TELEPHONE (OUTPATIENT)
Dept: ORTHOPEDIC SURGERY | Age: 40
End: 2020-07-20

## 2020-07-20 LAB
SARS-COV-2: NOT DETECTED
SOURCE: NORMAL

## 2020-07-20 NOTE — TELEPHONE ENCOUNTER
Pt notified of all appt dates,times,and locations. All questions and concerns addressed, including need for COVID-19 testing prior to procedure. Pt verbalized understanding and she is agreeable to this plan.

## 2020-07-20 NOTE — TELEPHONE ENCOUNTER
Cody Ferrer in [place] scheduling to schedule outpt procedure for 07/23/2020 in [address] [place] [address]

## 2020-07-21 RX ORDER — IBUPROFEN 800 MG/1
800 TABLET ORAL EVERY 6 HOURS PRN
Status: ON HOLD | COMMUNITY
End: 2020-07-23 | Stop reason: HOSPADM

## 2020-07-21 NOTE — PROGRESS NOTES
Have you been tested for COVID  Yes           Have you been told you were positive for COVID No  Have you had any known exposure to someone that is positive for COVID No  Do you have a cough                   No              Do you have shortness of breath No                 Do you have a sore throat            No                Are you having chills                    No                Are you having muscle aches. No                    Please come to the hospital wearing a mask and have your significant other wear a mask as well. Both of you should check your temperature before leaving to come here,  if it is 100 or higher please call [phone] for instruction.

## 2020-07-22 ENCOUNTER — ANESTHESIA EVENT (OUTPATIENT)
Dept: OPERATING ROOM | Age: 40
End: 2020-07-22
Payer: COMMERCIAL

## 2020-07-23 ENCOUNTER — HOSPITAL ENCOUNTER (OUTPATIENT)
Age: 40
Setting detail: OUTPATIENT SURGERY
Discharge: HOME | End: 2020-07-23
Attending: ORTHOPAEDIC SURGERY | Admitting: ORTHOPAEDIC SURGERY
Payer: COMMERCIAL

## 2020-07-23 ENCOUNTER — ANESTHESIA (OUTPATIENT)
Dept: OPERATING ROOM | Age: 40
End: 2020-07-23
Payer: COMMERCIAL

## 2020-07-23 VITALS
WEIGHT: 135 LBS | TEMPERATURE: 97.8 F | HEIGHT: 63 IN | RESPIRATION RATE: 16 BRPM | SYSTOLIC BLOOD PRESSURE: 130 MMHG | BODY MASS INDEX: 23.92 KG/M2 | DIASTOLIC BLOOD PRESSURE: 84 MMHG | OXYGEN SATURATION: 98 % | HEART RATE: 65 BPM

## 2020-07-23 VITALS
RESPIRATION RATE: 3 BRPM | TEMPERATURE: 96.3 F | OXYGEN SATURATION: 98 % | SYSTOLIC BLOOD PRESSURE: 104 MMHG | DIASTOLIC BLOOD PRESSURE: 70 MMHG

## 2020-07-23 LAB — HCG(URINE) PREGNANCY TEST: NEGATIVE

## 2020-07-23 PROCEDURE — 2500000003 HC RX 250 WO HCPCS

## 2020-07-23 PROCEDURE — L3650 SO 8 ABD RESTRAINT PRE OTS: HCPCS | Performed by: ORTHOPAEDIC SURGERY

## 2020-07-23 PROCEDURE — 6360000002 HC RX W HCPCS

## 2020-07-23 PROCEDURE — 2720000010 HC SURG SUPPLY STERILE: Performed by: ORTHOPAEDIC SURGERY

## 2020-07-23 PROCEDURE — 29882 ARTHRS KNE SRG MNISC RPR M/L: CPT | Performed by: ORTHOPAEDIC SURGERY

## 2020-07-23 PROCEDURE — 2580000003 HC RX 258

## 2020-07-23 PROCEDURE — 3700000000 HC ANESTHESIA ATTENDED CARE: Performed by: ORTHOPAEDIC SURGERY

## 2020-07-23 PROCEDURE — 6370000000 HC RX 637 (ALT 250 FOR IP)

## 2020-07-23 PROCEDURE — 6360000002 HC RX W HCPCS: Performed by: NURSE PRACTITIONER

## 2020-07-23 PROCEDURE — 3600000004 HC SURGERY LEVEL 4 BASE: Performed by: ORTHOPAEDIC SURGERY

## 2020-07-23 PROCEDURE — 2709999900 HC NON-CHARGEABLE SUPPLY: Performed by: ORTHOPAEDIC SURGERY

## 2020-07-23 PROCEDURE — 7100000011 HC PHASE II RECOVERY - ADDTL 15 MIN: Performed by: ORTHOPAEDIC SURGERY

## 2020-07-23 PROCEDURE — 6360000002 HC RX W HCPCS: Performed by: ORTHOPAEDIC SURGERY

## 2020-07-23 PROCEDURE — 6360000002 HC RX W HCPCS: Performed by: ANESTHESIOLOGY

## 2020-07-23 PROCEDURE — 29879 ARTHRS KNE SRG ABRASJ ARTHRP: CPT | Performed by: ORTHOPAEDIC SURGERY

## 2020-07-23 PROCEDURE — 7100000001 HC PACU RECOVERY - ADDTL 15 MIN: Performed by: ORTHOPAEDIC SURGERY

## 2020-07-23 PROCEDURE — 2500000003 HC RX 250 WO HCPCS: Performed by: ORTHOPAEDIC SURGERY

## 2020-07-23 PROCEDURE — 7100000000 HC PACU RECOVERY - FIRST 15 MIN: Performed by: ORTHOPAEDIC SURGERY

## 2020-07-23 PROCEDURE — C1713 ANCHOR/SCREW BN/BN,TIS/BN: HCPCS | Performed by: ORTHOPAEDIC SURGERY

## 2020-07-23 PROCEDURE — 3600000014 HC SURGERY LEVEL 4 ADDTL 15MIN: Performed by: ORTHOPAEDIC SURGERY

## 2020-07-23 PROCEDURE — 81025 URINE PREGNANCY TEST: CPT

## 2020-07-23 PROCEDURE — 7100000010 HC PHASE II RECOVERY - FIRST 15 MIN: Performed by: ORTHOPAEDIC SURGERY

## 2020-07-23 PROCEDURE — 3700000001 HC ADD 15 MINUTES (ANESTHESIA): Performed by: ORTHOPAEDIC SURGERY

## 2020-07-23 DEVICE — SYSTEM IMPL MENIS ROOT REP W/ PEEK SWIVELOCK: Type: IMPLANTABLE DEVICE | Site: KNEE | Status: FUNCTIONAL

## 2020-07-23 RX ORDER — MIDAZOLAM HYDROCHLORIDE 1 MG/ML
INJECTION INTRAMUSCULAR; INTRAVENOUS PRN
Status: DISCONTINUED | OUTPATIENT
Start: 2020-07-23 | End: 2020-07-23 | Stop reason: SDUPTHER

## 2020-07-23 RX ORDER — LABETALOL HYDROCHLORIDE 5 MG/ML
INJECTION, SOLUTION INTRAVENOUS PRN
Status: DISCONTINUED | OUTPATIENT
Start: 2020-07-23 | End: 2020-07-23 | Stop reason: SDUPTHER

## 2020-07-23 RX ORDER — MEPERIDINE HYDROCHLORIDE 25 MG/ML
INJECTION INTRAMUSCULAR; INTRAVENOUS; SUBCUTANEOUS
Status: COMPLETED
Start: 2020-07-23 | End: 2020-07-23

## 2020-07-23 RX ORDER — PROPOFOL 10 MG/ML
INJECTION, EMULSION INTRAVENOUS PRN
Status: DISCONTINUED | OUTPATIENT
Start: 2020-07-23 | End: 2020-07-23 | Stop reason: SDUPTHER

## 2020-07-23 RX ORDER — ROCURONIUM BROMIDE 10 MG/ML
INJECTION, SOLUTION INTRAVENOUS PRN
Status: DISCONTINUED | OUTPATIENT
Start: 2020-07-23 | End: 2020-07-23 | Stop reason: SDUPTHER

## 2020-07-23 RX ORDER — SCOLOPAMINE TRANSDERMAL SYSTEM 1 MG/1
PATCH, EXTENDED RELEASE TRANSDERMAL
Status: DISCONTINUED
Start: 2020-07-23 | End: 2020-07-23 | Stop reason: HOSPADM

## 2020-07-23 RX ORDER — SODIUM CHLORIDE 0.9 % (FLUSH) 0.9 %
10 SYRINGE (ML) INJECTION EVERY 12 HOURS SCHEDULED
Status: DISCONTINUED | OUTPATIENT
Start: 2020-07-23 | End: 2020-07-23 | Stop reason: HOSPADM

## 2020-07-23 RX ORDER — FENTANYL CITRATE 50 UG/ML
25 INJECTION, SOLUTION INTRAMUSCULAR; INTRAVENOUS EVERY 5 MIN PRN
Status: DISCONTINUED | OUTPATIENT
Start: 2020-07-23 | End: 2020-07-23 | Stop reason: HOSPADM

## 2020-07-23 RX ORDER — SODIUM CHLORIDE 0.9 % (FLUSH) 0.9 %
10 SYRINGE (ML) INJECTION PRN
Status: DISCONTINUED | OUTPATIENT
Start: 2020-07-23 | End: 2020-07-23 | Stop reason: HOSPADM

## 2020-07-23 RX ORDER — FENTANYL CITRATE 50 UG/ML
INJECTION, SOLUTION INTRAMUSCULAR; INTRAVENOUS PRN
Status: DISCONTINUED | OUTPATIENT
Start: 2020-07-23 | End: 2020-07-23 | Stop reason: SDUPTHER

## 2020-07-23 RX ORDER — OXYCODONE HYDROCHLORIDE AND ACETAMINOPHEN 5; 325 MG/1; MG/1
1 TABLET ORAL
Status: DISCONTINUED | OUTPATIENT
Start: 2020-07-23 | End: 2020-07-23 | Stop reason: HOSPADM

## 2020-07-23 RX ORDER — MEPERIDINE HYDROCHLORIDE 25 MG/ML
12.5 INJECTION INTRAMUSCULAR; INTRAVENOUS; SUBCUTANEOUS EVERY 5 MIN PRN
Status: DISCONTINUED | OUTPATIENT
Start: 2020-07-23 | End: 2020-07-23 | Stop reason: HOSPADM

## 2020-07-23 RX ORDER — NEOSTIGMINE METHYLSULFATE 1 MG/ML
INJECTION, SOLUTION INTRAVENOUS PRN
Status: DISCONTINUED | OUTPATIENT
Start: 2020-07-23 | End: 2020-07-23 | Stop reason: SDUPTHER

## 2020-07-23 RX ORDER — SCOLOPAMINE TRANSDERMAL SYSTEM 1 MG/1
1 PATCH, EXTENDED RELEASE TRANSDERMAL
Status: DISCONTINUED | OUTPATIENT
Start: 2020-07-23 | End: 2020-07-23 | Stop reason: HOSPADM

## 2020-07-23 RX ORDER — LIDOCAINE HYDROCHLORIDE 20 MG/ML
INJECTION, SOLUTION EPIDURAL; INFILTRATION; INTRACAUDAL; PERINEURAL PRN
Status: DISCONTINUED | OUTPATIENT
Start: 2020-07-23 | End: 2020-07-23 | Stop reason: SDUPTHER

## 2020-07-23 RX ORDER — ROPIVACAINE HYDROCHLORIDE 5 MG/ML
INJECTION, SOLUTION EPIDURAL; INFILTRATION; PERINEURAL PRN
Status: DISCONTINUED | OUTPATIENT
Start: 2020-07-23 | End: 2020-07-23 | Stop reason: ALTCHOICE

## 2020-07-23 RX ORDER — ONDANSETRON 2 MG/ML
INJECTION INTRAMUSCULAR; INTRAVENOUS PRN
Status: DISCONTINUED | OUTPATIENT
Start: 2020-07-23 | End: 2020-07-23 | Stop reason: SDUPTHER

## 2020-07-23 RX ORDER — KETOROLAC TROMETHAMINE 10 MG/1
10 TABLET, FILM COATED ORAL EVERY 6 HOURS PRN
Qty: 6 TABLET | Refills: 0 | Status: SHIPPED | OUTPATIENT
Start: 2020-07-23 | End: 2020-07-29

## 2020-07-23 RX ORDER — MIDAZOLAM HYDROCHLORIDE 1 MG/ML
INJECTION INTRAMUSCULAR; INTRAVENOUS
Status: COMPLETED
Start: 2020-07-23 | End: 2020-07-23

## 2020-07-23 RX ORDER — HYDROCODONE BITARTRATE AND ACETAMINOPHEN 5; 325 MG/1; MG/1
1 TABLET ORAL EVERY 6 HOURS PRN
Qty: 28 TABLET | Refills: 0 | Status: SHIPPED | OUTPATIENT
Start: 2020-07-23 | End: 2020-07-30

## 2020-07-23 RX ORDER — SODIUM CHLORIDE 9 MG/ML
INJECTION, SOLUTION INTRAVENOUS CONTINUOUS PRN
Status: DISCONTINUED | OUTPATIENT
Start: 2020-07-23 | End: 2020-07-23

## 2020-07-23 RX ORDER — DEXAMETHASONE SODIUM PHOSPHATE 4 MG/ML
INJECTION, SOLUTION INTRA-ARTICULAR; INTRALESIONAL; INTRAMUSCULAR; INTRAVENOUS; SOFT TISSUE PRN
Status: DISCONTINUED | OUTPATIENT
Start: 2020-07-23 | End: 2020-07-23 | Stop reason: SDUPTHER

## 2020-07-23 RX ORDER — ALBUTEROL SULFATE 90 UG/1
AEROSOL, METERED RESPIRATORY (INHALATION) PRN
Status: DISCONTINUED | OUTPATIENT
Start: 2020-07-23 | End: 2020-07-23 | Stop reason: SDUPTHER

## 2020-07-23 RX ORDER — GLYCOPYRROLATE 1 MG/5 ML
SYRINGE (ML) INTRAVENOUS PRN
Status: DISCONTINUED | OUTPATIENT
Start: 2020-07-23 | End: 2020-07-23 | Stop reason: SDUPTHER

## 2020-07-23 RX ORDER — SODIUM CHLORIDE, SODIUM LACTATE, POTASSIUM CHLORIDE, CALCIUM CHLORIDE 600; 310; 30; 20 MG/100ML; MG/100ML; MG/100ML; MG/100ML
INJECTION, SOLUTION INTRAVENOUS CONTINUOUS PRN
Status: DISCONTINUED | OUTPATIENT
Start: 2020-07-23 | End: 2020-07-23 | Stop reason: SDUPTHER

## 2020-07-23 RX ORDER — MIDAZOLAM HYDROCHLORIDE 1 MG/ML
1 INJECTION INTRAMUSCULAR; INTRAVENOUS EVERY 5 MIN PRN
Status: DISCONTINUED | OUTPATIENT
Start: 2020-07-23 | End: 2020-07-23 | Stop reason: HOSPADM

## 2020-07-23 RX ADMIN — Medication 3 MG: at 10:43

## 2020-07-23 RX ADMIN — ALBUTEROL SULFATE 3 PUFF: 90 AEROSOL, METERED RESPIRATORY (INHALATION) at 10:35

## 2020-07-23 RX ADMIN — Medication 0.6 MG: at 10:43

## 2020-07-23 RX ADMIN — LIDOCAINE HYDROCHLORIDE 40 MG: 20 INJECTION, SOLUTION EPIDURAL; INFILTRATION; INTRACAUDAL; PERINEURAL at 10:40

## 2020-07-23 RX ADMIN — MIDAZOLAM HYDROCHLORIDE 1 MG: 1 INJECTION, SOLUTION INTRAMUSCULAR; INTRAVENOUS at 08:45

## 2020-07-23 RX ADMIN — LIDOCAINE HYDROCHLORIDE 60 MG: 20 INJECTION, SOLUTION EPIDURAL; INFILTRATION; INTRACAUDAL; PERINEURAL at 09:02

## 2020-07-23 RX ADMIN — MEPERIDINE HYDROCHLORIDE 12.5 MG: 25 INJECTION INTRAMUSCULAR; INTRAVENOUS; SUBCUTANEOUS at 11:11

## 2020-07-23 RX ADMIN — ALBUTEROL SULFATE 3 PUFF: 90 AEROSOL, METERED RESPIRATORY (INHALATION) at 09:39

## 2020-07-23 RX ADMIN — ROCURONIUM BROMIDE 10 MG: 10 INJECTION, SOLUTION INTRAVENOUS at 09:27

## 2020-07-23 RX ADMIN — ONDANSETRON 4 MG: 2 INJECTION INTRAMUSCULAR; INTRAVENOUS at 10:36

## 2020-07-23 RX ADMIN — FENTANYL CITRATE 25 MCG: 50 INJECTION, SOLUTION INTRAMUSCULAR; INTRAVENOUS at 11:48

## 2020-07-23 RX ADMIN — SODIUM CHLORIDE, POTASSIUM CHLORIDE, SODIUM LACTATE AND CALCIUM CHLORIDE: 600; 310; 30; 20 INJECTION, SOLUTION INTRAVENOUS at 08:53

## 2020-07-23 RX ADMIN — MIDAZOLAM HYDROCHLORIDE 1 MG: 1 INJECTION INTRAMUSCULAR; INTRAVENOUS at 08:45

## 2020-07-23 RX ADMIN — LABETALOL HYDROCHLORIDE 2.5 MG: 5 INJECTION INTRAVENOUS at 10:11

## 2020-07-23 RX ADMIN — MEPERIDINE HYDROCHLORIDE 12.5 MG: 25 INJECTION, SOLUTION INTRAMUSCULAR; INTRAVENOUS; SUBCUTANEOUS at 11:11

## 2020-07-23 RX ADMIN — MIDAZOLAM 1 MG: 1 INJECTION INTRAMUSCULAR; INTRAVENOUS at 08:53

## 2020-07-23 RX ADMIN — DEXAMETHASONE SODIUM PHOSPHATE 10 MG: 4 INJECTION, SOLUTION INTRAMUSCULAR; INTRAVENOUS at 09:02

## 2020-07-23 RX ADMIN — ROCURONIUM BROMIDE 30 MG: 10 INJECTION, SOLUTION INTRAVENOUS at 09:02

## 2020-07-23 RX ADMIN — LABETALOL HYDROCHLORIDE 2.5 MG: 5 INJECTION INTRAVENOUS at 10:19

## 2020-07-23 RX ADMIN — Medication 2 G: at 09:10

## 2020-07-23 RX ADMIN — SODIUM CHLORIDE, POTASSIUM CHLORIDE, SODIUM LACTATE AND CALCIUM CHLORIDE: 600; 310; 30; 20 INJECTION, SOLUTION INTRAVENOUS at 09:58

## 2020-07-23 RX ADMIN — PROPOFOL 200 MG: 10 INJECTION, EMULSION INTRAVENOUS at 09:02

## 2020-07-23 RX ADMIN — FENTANYL CITRATE 50 MCG: 50 INJECTION, SOLUTION INTRAMUSCULAR; INTRAVENOUS at 09:02

## 2020-07-23 RX ADMIN — FENTANYL CITRATE 25 MCG: 50 INJECTION, SOLUTION INTRAMUSCULAR; INTRAVENOUS at 11:35

## 2020-07-23 RX ADMIN — FENTANYL CITRATE 50 MCG: 50 INJECTION, SOLUTION INTRAMUSCULAR; INTRAVENOUS at 10:02

## 2020-07-23 ASSESSMENT — PULMONARY FUNCTION TESTS
PIF_VALUE: 22
PIF_VALUE: 22
PIF_VALUE: 21
PIF_VALUE: 26
PIF_VALUE: 0
PIF_VALUE: 0
PIF_VALUE: 22
PIF_VALUE: 25
PIF_VALUE: 6
PIF_VALUE: 25
PIF_VALUE: 22
PIF_VALUE: 19
PIF_VALUE: 28
PIF_VALUE: 22
PIF_VALUE: 28
PIF_VALUE: 6
PIF_VALUE: 22
PIF_VALUE: 20
PIF_VALUE: 20
PIF_VALUE: 21
PIF_VALUE: 26
PIF_VALUE: 20
PIF_VALUE: 4
PIF_VALUE: 20
PIF_VALUE: 22
PIF_VALUE: 22
PIF_VALUE: 19
PIF_VALUE: 22
PIF_VALUE: 4
PIF_VALUE: 3
PIF_VALUE: 22
PIF_VALUE: 23
PIF_VALUE: 25
PIF_VALUE: 22
PIF_VALUE: 21
PIF_VALUE: 25
PIF_VALUE: 24
PIF_VALUE: 26
PIF_VALUE: 0
PIF_VALUE: 27
PIF_VALUE: 22
PIF_VALUE: 25
PIF_VALUE: 22
PIF_VALUE: 26
PIF_VALUE: 19
PIF_VALUE: 1
PIF_VALUE: 1
PIF_VALUE: 26
PIF_VALUE: 21
PIF_VALUE: 22
PIF_VALUE: 5
PIF_VALUE: 26
PIF_VALUE: 21
PIF_VALUE: 22
PIF_VALUE: 20
PIF_VALUE: 0
PIF_VALUE: 4
PIF_VALUE: 24
PIF_VALUE: 21
PIF_VALUE: 19
PIF_VALUE: 26
PIF_VALUE: 22
PIF_VALUE: 19
PIF_VALUE: 6
PIF_VALUE: 27
PIF_VALUE: 22
PIF_VALUE: 22
PIF_VALUE: 25
PIF_VALUE: 18
PIF_VALUE: 28
PIF_VALUE: 26
PIF_VALUE: 22
PIF_VALUE: 25
PIF_VALUE: 27
PIF_VALUE: 20
PIF_VALUE: 23
PIF_VALUE: 22
PIF_VALUE: 19
PIF_VALUE: 26
PIF_VALUE: 4
PIF_VALUE: 19
PIF_VALUE: 1
PIF_VALUE: 10
PIF_VALUE: 25
PIF_VALUE: 21
PIF_VALUE: 1
PIF_VALUE: 25
PIF_VALUE: 20
PIF_VALUE: 25
PIF_VALUE: 8
PIF_VALUE: 27
PIF_VALUE: 5
PIF_VALUE: 1
PIF_VALUE: 26
PIF_VALUE: 19
PIF_VALUE: 25
PIF_VALUE: 22
PIF_VALUE: 9
PIF_VALUE: 0
PIF_VALUE: 26
PIF_VALUE: 22
PIF_VALUE: 19
PIF_VALUE: 21
PIF_VALUE: 31
PIF_VALUE: 22
PIF_VALUE: 23
PIF_VALUE: 21
PIF_VALUE: 1
PIF_VALUE: 26
PIF_VALUE: 20
PIF_VALUE: 21
PIF_VALUE: 4
PIF_VALUE: 26
PIF_VALUE: 22
PIF_VALUE: 1

## 2020-07-23 ASSESSMENT — PAIN SCALES - GENERAL
PAINLEVEL_OUTOF10: 7
PAINLEVEL_OUTOF10: 8
PAINLEVEL_OUTOF10: 0
PAINLEVEL_OUTOF10: 4

## 2020-07-23 ASSESSMENT — LIFESTYLE VARIABLES: SMOKING_STATUS: 1

## 2020-07-23 ASSESSMENT — PAIN DESCRIPTION - LOCATION
LOCATION: KNEE
LOCATION: KNEE

## 2020-07-23 ASSESSMENT — PAIN DESCRIPTION - ORIENTATION
ORIENTATION: LEFT
ORIENTATION: LEFT

## 2020-07-23 ASSESSMENT — PAIN DESCRIPTION - DESCRIPTORS: DESCRIPTORS: SHOOTING;CONSTANT

## 2020-07-23 ASSESSMENT — PAIN - FUNCTIONAL ASSESSMENT: PAIN_FUNCTIONAL_ASSESSMENT: 0-10

## 2020-07-23 ASSESSMENT — PAIN DESCRIPTION - PAIN TYPE: TYPE: SURGICAL PAIN

## 2020-07-23 NOTE — PROGRESS NOTES
CLINICAL PHARMACY NOTE: MEDS TO [address] [place] [address] Drive Select Patient?: No  Total # of Prescriptions Filled: 2   The following medications were delivered to the patient:  · Ketorolac 10 mg  · Hydrocodone 5-325 mg  Total # of Interventions Completed: 7  Time Spent (min): 45    Additional Documentation:

## 2020-07-23 NOTE — H&P
New Knee Patient     Referring Provider:   ED 6/18/2020    CHIEF COMPLAINT:   No chief complaint on file. HPI:    Goldie Li is a 47y.o. year old female who is seen today  for evaluation of left knee pain. She reports the pain has been ongoing for the past 2 weeks. She does recall a specific injury which started the pain. Two weeks ago running up steps, tripped and knee with twisting mostion. She states she heard and felt a pop at the time of injury. She has been ambulating at times with crutches. She reports the pain is worsening since injury. The patient does not have mechanical symptoms. She denies a feeling of instability. The patient is not working. Has history of ACL reconstruction about 15 to 20 years ago from which she did well. PAST MEDICAL HISTORY  Past Medical History:   Diagnosis Date    ADHD     Depression     controlled with med, per patient    PONV (postoperative nausea and vomiting)     Tear of left meniscus as current injury        PAST SURGICAL HISTORY  Past Surgical History:   Procedure Laterality Date    ANTERIOR CRUCIATE LIGAMENT REPAIR Left 1998    CARPAL TUNNEL RELEASE Right 4/2015    DILATION AND CURETTAGE OF UTERUS  2012    OTHER SURGICAL HISTORY Right 11/18/15    RIGHT SHOULDER MANIPULATION & ARTHROSCOPY    ROTATOR CUFF REPAIR  6/10/15    right rotator cuff open revision    ROTATOR CUFF REPAIR  4/2015         FAMILY HISTORY   History reviewed. No pertinent family history.     SOCIAL HISTORY  Social History     Socioeconomic History    Marital status:      Spouse name: Not on file    Number of children: Not on file    Years of education: Not on file    Highest education level: Not on file   Occupational History    Not on file   Social Needs    Financial resource strain: Not on file    Food insecurity     Worry: Not on file     Inability: Not on file    Transportation needs     Medical: Not on file     Non-medical: Not on file   Tobacco Use    Smoking status: Current Every Day Smoker     Packs/day: 0.50     Years: 5.00     Pack years: 2.50     Types: Cigarettes    Smokeless tobacco: Never Used   Substance and Sexual Activity    Alcohol use: No    Drug use: No    Sexual activity: Not on file   Lifestyle    Physical activity     Days per week: Not on file     Minutes per session: Not on file    Stress: Not on file   Relationships    Social connections     Talks on phone: Not on file     Gets together: Not on file     Attends Scientologist service: Not on file     Active member of club or organization: Not on file     Attends meetings of clubs or organizations: Not on file     Relationship status: Not on file    Intimate partner violence     Fear of current or ex partner: Not on file     Emotionally abused: Not on file     Physically abused: Not on file     Forced sexual activity: Not on file   Other Topics Concern    Not on file   Social History Narrative    Not on file     Social History     Occupational History    Not on file   Tobacco Use    Smoking status: Current Every Day Smoker     Packs/day: 0.50     Years: 5.00     Pack years: 2.50     Types: Cigarettes    Smokeless tobacco: Never Used   Substance and Sexual Activity    Alcohol use: No    Drug use: No    Sexual activity: Not on file       CURRENT MEDICATIONS   No current outpatient medications on file.     ALLERGIES  No Known Allergies    Controlled Substances Monitoring:          REVIEW OF SYSTEMS:     Constitutional:  Negative for weight loss, fevers, chills, fatigue  Cardiovascular: Negative for chest pain, palpitations  Pulmonary: Negative for shortness of breath, labored breathing, cough  GI: negative for abdominal pain, nausea, vomitting   MSK: per HPI  Skin: negative for rash, open wounds    All other systems reviewed and are negative         PHYSICAL EXAM     Vitals:    07/21/20 1451 07/23/20 0820   BP:  (!) 141/93   Pulse:  69   Resp:  18   Temp:  97.3 °F (36.3 °C)   TempSrc: Temporal   SpO2:  97%   Weight: 135 lb (61.2 kg) 135 lb (61.2 kg)   Height: 5' 3\" (1.6 m) 5' 3\" (1.6 m)       Height: 5' 3\" (1.6 m)  Weight: 135 lb per pt  BMI:  Body mass index is 23.91 kg/m². General: The patient is alert and oriented x 3, appears to be stated age and in no distress. HEENT: head is normocephalic, atraumatic. EOMI. Neck: supple, trachea midline, no thyromegaly   Cardiovascular: peripheral pulses palpable. Normal Capillary refill   Respiratory: breathing unlabored, chest expansion symmetric   Skin: no rash, no open wounds, no erythema  Psych: normal affect; mood stable  Neurologic: gait normal, sensation grossly intact in extremities  MSK:        Lower Extremity:   Ipsilateral hip exam shows normal range of motion without pain with impingement testing. Left knee exam range of motion is 120-10. Lateral tenderness to palpation. Mild swelling and bruising present on inspection. Mild laxity to varus and valgus exams. Soft endpoint on Lachman exam.  Drawer test was negative. Patella tracks midline with crepitus present. IMAGING:    XR: 4 views of the left bilateral knee show status post ACL reconstruction with bone screws visualized. Negative for acute process    impression x-ray 4 views left knee shows status post ACL reconstruction with bone screws visualized. X-rays negative for acute process      ASSESSMENT  Left knee injury, history of ACL reconstruction    PLAN  Today we discussed her left knee. Patient reported a twisting injury when she fell while running up her steps 2 weeks ago. She was instructed that she can discontinue the crutches and weight-bear as tolerated. We discussed obtaining an MRI to rule out ACL re-tearing this is other internal derangement. Patient is in agreement an MRI was ordered during today's visit. Patient will call the office to follow-up after MRI is obtained.       Diane Hernandez, [address] [facility]  Orthopedic Surgery   07/23/20  8:52 AM    Staff Addendum    I have seen and evaluated the patient and agree with the assessment and plan as documented by Diane Hernandez CNP. I have performed the key components of the history and physical examination and concur with the findings and plan, and have made changes where appropriate/necessary. Feliz Edwards MD  [address]      Update History & Physical    The patient's History and Physical was reviewed with the patient and there were no significant changes. I examined the patient and there were no significant changes from the previous History and Physical.    Plan: After review MRI, she likely has a medial meniscus root tear. We discussed she would benefit from repair versus partial meniscectomy in the event that her tear is not repairable. We also discussed her ACL. ACL appears to be intact on MRI but somewhat wavy and she does have some laxity with testing. We discussed potential revision ACL reconstruction as well using allograft. Risks were discussed in detail including but not limited to infection, neurovascular injury, persistent pain, instability, need for other procedures, unforeseen risks.   She understands would like to proceed    Electronically signed by Feliz Edwards MD  on 7/23/2020 at 8:52 AM

## 2020-07-23 NOTE — OP NOTE
informed consent, and elected to proceed      OPERATIVE PROCEDURE: The patient was transferred from hospital bed to OR table and underwent LMA anesthesia. The patient was positioned with the knees at the fold of the bed. Exam under anesthesia ensued. There was a good endpoint with Lockman but mild increased laxity. There was negative pivot shift. The knee was pre-prepped and the joint was injected with a 20 cc mixture of equal parts 1% lidocaine with epi, 1% lidocaine without, and 0.25% Marcaine. Next, proposed portals and incisions were also pre-injected. A tourniquet was applied high on the thigh of the operative leg. A lateral post was placed. A pad was placed below the non operative leg. The leg was then prepped and draped in sterile fashion. Prior to incision, it was confirmed that 2 g Ancef was given for prophylactic antibiotics. A time out was performed involving surgeon, anesthesia team, and operating room nurses and techs confirming the patient, procedure, and site of surgery. Planned surgical incisions and portals were then again pre-injected. The knee was flexed approximately 45 degrees. A 15 blade was used to make the lateral portal and medial portal incisions in a lateral parapatellar location, just inferior to the patella and just lateral to the patellar tendon. A hemostat was used to open up the portal, and the knee was extended as the scope trocar was inserted into the suprapatellar pouch. The trocar was removed and the cameral inserted. Diagnostic arthroscopy ensued. The patella was unremarkable    The trochlea was unremarkable     The medial gutter was free of debris     The Medial meniscus was notable for a root tear. The medial femoral condyle was notable for a approximate 4 x 4 millimeter full-thickness articular cartilage defect.     The medial tibial plateau was unremarkable    The ACL was intact, non-anatomic in position but maintained fairly good tension from femur to tibia. The graft was roughly in the 12 o'clock position on the femur. Despite malposition, exam under anesthesia displayed stable pivot shift and endpoint on Lockman so we did not feel that we needed to revise her ACL at this point. The PCL was intact. The lateral meniscus was unremarkable     The lateral femoral condyle was unremarkable    The lateral tibial plateau was unremarkable. The lateral gutter was free of debris. We focused on the meniscus repair. We felt root repair was appropriate. A medial passport was placed. We then came in and passed 2 luggage tag sutures around the meniscus. We utilized the universal guide to drill on the native footprint of the posterior root. We then pulled the sutures down through the tunnel and secured them with a swivel lock anchor which nicely repaired the meniscal root. This was stable to probing    We then focused on the medial femoral condyle. We felt given the small size of the defect we could just perform microfracture. We utilized an arthroscopic curette to create vertical walls of the lesion. We then drilled holes around the periphery with a K wire and 1 central hole. We had nice spread between the holes and noted bleeding bone down when turning off the pump. The knee was then thoroughly irrigated until irrigant was free of debris and was clear. Canulas were then removed from the knee, as was excess fluid. The portals were closed with 4-O nylon. A mixture of ropivicaine and toradol was injected into the knee joint. Leftover local anesthetic from pre-injection was injected around the incisions. Steri strips were applied to all wounds, and a sterile dressing and steve hose were applied. The patient was awoken from anesthesia and transferred to the hospital bed. He was taken to the recovery room in stable condition.       IMPLANTS: none    TOURNIQUET TIME: n/a     ESTIMATED BLOOD LOSS: 10 mL    COMPLICATIONS: none    POST OPERATIVE PLAN: The patient will be discharged home from same day surgery was stable. She will ice and take prescribed medications this evening. Weight bearing will be nonweightbearing. She will stay in her knee immobilizer until seen for follow-up. Dressing is to remain on. She will be seen for post operative visit POD 1 or 2.          Breann Steel MD  Orthopaedic Surgery   7/23/20  1:58 PM

## 2020-07-23 NOTE — ANESTHESIA PRE PROCEDURE
Department of Anesthesiology  Preprocedure Note       Name:  Marlena Farrell   Age:  47 y.o.  :  1980                                          MRN:  [id number]         Date:  2020      Surgeon: Wanda Charles):  Marilee Gonzales MD    Procedure: Procedure(s):  LEFT KNEE ARTHROSCOPY MEDIAL MENISCUS ROOT REPAIR POSSIBLE REVISION ACL RECONSTRUCTION WITH ALLOGRAFT     +++ARTHREX+++    Medications prior to admission:   Prior to Admission medications    Medication Sig Start Date End Date Taking? Authorizing Provider   ibuprofen (ADVIL;MOTRIN) 800 MG tablet Take 800 mg by mouth every 6 hours as needed for Pain   Yes Historical Provider, MD   ARIPiprazole (ABILIFY) 5 MG tablet Take 5 mg by mouth nightly  20  Yes Historical Provider, MD   lamoTRIgine (LAMICTAL) 200 MG tablet Take 200 mg by mouth nightly  20  Yes Historical Provider, MD   sertraline (ZOLOFT) 50 MG tablet Take 50 mg by mouth nightly    Yes Historical Provider, MD   Multiple Vitamins-Minerals (THERAPEUTIC MULTIVITAMIN-MINERALS) tablet Take 1 tablet by mouth daily   Yes Historical Provider, MD   amphetamine-dextroamphetamine (ADDERALL) 20 MG tablet Take 20 mg by mouth Daily with lunch. 6/10/20   Historical Provider, MD   VYVANSE 70 MG capsule Take 70 mg by mouth every morning.   20   Historical Provider, MD       Current medications:    Current Facility-Administered Medications   Medication Dose Route Frequency Provider Last Rate Last Dose    sodium chloride flush 0.9 % injection 10 mL  10 mL Intravenous 2 times per day Robet Situ, APRN - CNP        sodium chloride flush 0.9 % injection 10 mL  10 mL Intravenous PRN Robet Situ, APRN - CNP        ceFAZolin (ANCEF) 2 g in sterile water 20 mL IV syringe  2 g Intravenous On Call to RODOLFO Pualino CNP           Allergies:  No Known Allergies    Problem List:    Patient Active Problem List   Diagnosis Code    Adhesive capsulitis of right shoulder M75.01    Contusion of finger of left hand S60.00XA    Contusion of multiple sites of left shoulder and upper arm S40.012A, S40.022A    Laceration of left upper extremity S41.112A       Past Medical History:        Diagnosis Date    ADHD     Depression     controlled with med, per patient    PONV (postoperative nausea and vomiting)     Tear of left meniscus as current injury        Past Surgical History:        Procedure Laterality Date    ANTERIOR CRUCIATE LIGAMENT REPAIR Left 1998    CARPAL TUNNEL RELEASE Right 4/2015    DILATION AND CURETTAGE OF UTERUS  2012    OTHER SURGICAL HISTORY Right 11/18/15    RIGHT SHOULDER MANIPULATION & ARTHROSCOPY    ROTATOR CUFF REPAIR  6/10/15    right rotator cuff open revision    ROTATOR CUFF REPAIR  4/2015       Social History:    Social History     Tobacco Use    Smoking status: Current Every Day Smoker     Packs/day: 0.50     Years: 5.00     Pack years: 2.50     Types: Cigarettes    Smokeless tobacco: Never Used   Substance Use Topics    Alcohol use: No                                Ready to quit: Not Answered  Counseling given: Not Answered      Vital Signs (Current):   Vitals:    07/21/20 1451 07/23/20 0820   BP:  (!) 141/93   Pulse:  69   Resp:  18   Temp:  97.3 °F (36.3 °C)   TempSrc:  Temporal   SpO2:  97%   Weight: 135 lb (61.2 kg) 135 lb (61.2 kg)   Height: 5' 3\" (1.6 m) 5' 3\" (1.6 m)                                              BP Readings from Last 3 Encounters:   07/23/20 (!) 141/93   06/18/20 128/80   01/08/20 120/80       NPO Status: Time of last liquid consumption: 2030                        Time of last solid consumption: 2030                        Date of last liquid consumption: 07/22/20                        Date of last solid food consumption: 07/22/20    BMI:   Wt Readings from Last 3 Encounters:   07/23/20 135 lb (61.2 kg)   07/13/20 135 lb (61.2 kg)   06/24/20 135 lb (61.2 kg)     Body mass index is 23.91 kg/m².     CBC:   Lab Results   Component Value Date WBC 5.3 05/08/2019    RBC 3.88 05/08/2019    HGB 11.3 05/08/2019    HCT 35.7 05/08/2019    MCV 92.0 05/08/2019    RDW 12.9 05/08/2019     05/08/2019       CMP:   Lab Results   Component Value Date     05/08/2019    K 3.8 05/08/2019     05/08/2019    CO2 27 05/08/2019    BUN 9 05/08/2019    CREATININE 0.7 05/08/2019    GFRAA >60 05/08/2019    LABGLOM >60 05/08/2019    GLUCOSE 84 05/08/2019    PROT 6.0 05/08/2019    CALCIUM 8.4 05/08/2019    BILITOT 0.2 05/08/2019    ALKPHOS 53 05/08/2019    AST 15 05/08/2019    ALT 11 05/08/2019       POC Tests: No results for input(s): POCGLU, POCNA, POCK, POCCL, POCBUN, POCHEMO, POCHCT in the last 72 hours. Coags:   Lab Results   Component Value Date    PROTIME 11.7 01/27/2015    INR 1.1 01/27/2015    APTT 32.3 01/27/2015       HCG (If Applicable):   Lab Results   Component Value Date    PREGTESTUR NEGATIVE 07/23/2020        ABGs: No results found for: PHART, PO2ART, RKV7AHZ, TTY8XSM, BEART, Q3PQQYNV     Type & Screen (If Applicable):  No results found for: LABABO, LABRH    Drug/Infectious Status (If Applicable):  No results found for: HIV, HEPCAB    COVID-19 Screening (If Applicable):   Lab Results   Component Value Date    COVID19 Not Detected 07/17/2020         Anesthesia Evaluation  Patient summary reviewed and Nursing notes reviewed   history of anesthetic complications: PONV. Airway: Mallampati: III  TM distance: >3 FB   Neck ROM: full  Mouth opening: > = 3 FB Dental:          Pulmonary: breath sounds clear to auscultation  (+) current smoker          Patient smoked on day of surgery. Cardiovascular:Negative CV ROS            Rhythm: regular  Rate: normal           Beta Blocker:  Not on Beta Blocker         Neuro/Psych:   (+) psychiatric history (Depression, ADHD): stable with treatmentdepression/anxiety              ROS comment: Numbness and tingling left leg.  GI/Hepatic/Renal: Neg GI/Hepatic/Renal ROS            Endo/Other: Negative Endo/Other ROS             Pt had no PAT visit       Abdominal:           Vascular: negative vascular ROS. Radiation Dose Estimates     No radiation information found for this patient    Narrative    Patient MRN: [id number]    : 1980    Age:  47 years    Gender: Female    Order Date: 7/10/2020 6:16 PM        Exam: MRI KNEE LEFT WO CONTRAST        Number of Images: 157 views        Indication: Left knee pain following injury one month earlier. History    of prior cruciate ligament repair more than 10 years earlier. Comparison: Left knee radiographs 2020        TECHNIQUE: The patient was evaluated and 1.5 [employer] magnet    without gadolinium contrast. Multiplanar, multisequence imaging was    performed. Findings: There is increased signal along the medial collateral ligament, and    there is very subtle medial subluxation of the body of the medial    meniscus relative the overlying and underlying bony structures. The    appearance is suspicious for a medial collateral sprain and possible    disruption of the femoral meniscal fascicle. Extra-articular soft    tissues show normal appearance of the quadriceps and patellar tendons    and the lateral collateral complex. Patellar retinacular structures    are intact. There is some increased proton-density signal in the    prepatellar soft tissues along the patellar tendon. Patellofemoral and    tibial-femoral condylar surfaces appear intact. Skeletal signal shows        Intra-articular anatomy shows evidence of prior anterior cruciate    ligament repair, which appears uncomplicated, and the posterior    cruciate ligament is intact. Metallic artifacts related to the ACL    repair are noted in the anterior tibia and medial femoral metaphyseal    region. There is a joint effusion distending the suprapatellar bursal    region.  There is no evidence of a popliteal cyst.        The medial meniscus is normal in appearance. The lateral meniscus is normal in appearance. Impression    Probable medial collateral sprain with joint effusion, but menisci,    cruciate ligaments, and skeletal structures are unremarkable. Anesthesia Plan      general     ASA 3     (PIV #20 Right AC)  Induction: intravenous. BIS  MIPS: Postoperative opioids intended, Prophylactic antiemetics administered and Postoperative trial extubation. Anesthetic plan and risks discussed with patient. Use of blood products discussed with patient whom consented to blood products. Plan discussed with CRNA.         [address],    7/23/2020

## 2020-07-24 ENCOUNTER — OFFICE VISIT (OUTPATIENT)
Dept: ORTHOPEDIC SURGERY | Age: 40
End: 2020-07-24

## 2020-07-24 VITALS — BODY MASS INDEX: 23.92 KG/M2 | TEMPERATURE: 99 F | HEIGHT: 63 IN | WEIGHT: 135 LBS

## 2020-07-24 PROCEDURE — 99024 POSTOP FOLLOW-UP VISIT: CPT | Performed by: NURSE PRACTITIONER

## 2020-07-24 NOTE — PROGRESS NOTES
Surgical dressings were removed s/p Left knee arthroscopy, medial meniscus root repair, microfracture of medial femoral condyle 4x4 mm grade IV defect on 7/23/2020. Incision was cleaned with alcohol prep pads. Minimal swelling and bleeding in area. Steri stripes and Tegaderm placed over the area to cover surgical sites x 1 week.     PERIF

## 2020-07-29 ENCOUNTER — OFFICE VISIT (OUTPATIENT)
Dept: ORTHOPEDIC SURGERY | Age: 40
End: 2020-07-29

## 2020-07-29 VITALS — TEMPERATURE: 97.3 F

## 2020-07-29 PROCEDURE — 99024 POSTOP FOLLOW-UP VISIT: CPT | Performed by: ORTHOPAEDIC SURGERY

## 2020-07-29 NOTE — PROGRESS NOTES
Follow Up Post Operative Visit     Surgery: Left knee arthroscopy, medial meniscus root repair, microfracture of medial femoral condyle 4x4 mm grade IV defect  Date: 7/23/2020    Subjective:    Anuj Ortiz is here for follow up visit s/p above procedure. She is doing well. She has been remaining in knee immobilizer at all times as directed. Reports that she had a fall when trying to sit down over the weekend was concerned she may have reinjured her knee. She reports that knee immobilizer was in place at time of accident. Reports mild lateral pain at time of exam.    Controlled Substances Monitoring:        Physical Exam:    No data recorded    General: Alert and oriented x3, no acute distress  Cardiovascular/pulmonary: No labored breathing, peripheral perfusion intact  Musculoskeletal:    Left knee exam knee remain straight knee immobilizer is present. Mild swelling and bruising over medial incision site. Sutures were removed incisions are closed edges are well approximated. Imaging: No new imaging obtained today. Assessment and Plan: Status post left knee arthroscopy, medial meniscus root repair, microfracture of medial femoral condyle    Today we discussed her left knee. Patient presented today for suture removal, she reported a fall over the weekend when trying to sit down and was concerned of mild lateral pain. Patient stated the knee immobilizer was in place during fall. We will continue to monitor, patient will remain nonweightbearing with knee immobilizer locked straight and progress with range of motion as per protocol. She will follow-up in 4 weeks, at that time we will transition her to physical therapy. Roopa Noriega [facility]  Orthopedic Surgery   07/29/20  8:23 AM      Staff Addendum    I have seen and evaluated the patient and agree with the assessment and plan as documented by George Petersen CNP.  I have performed the key components of the history and physical examination and

## 2020-07-30 NOTE — TELEPHONE ENCOUNTER
Pt is requesting a refill on her norco.     Surgery: Left knee arthroscopy, medial meniscus root repair, microfracture of medial femoral condyle 4x4 mm grade IV defect  Date: 7/23/2020     Ordered and pended

## 2020-07-31 RX ORDER — HYDROCODONE BITARTRATE AND ACETAMINOPHEN 5; 325 MG/1; MG/1
1 TABLET ORAL EVERY 6 HOURS PRN
Qty: 20 TABLET | Refills: 0 | Status: SHIPPED | OUTPATIENT
Start: 2020-07-31 | End: 2020-08-05

## 2020-08-06 ENCOUNTER — EVALUATION (OUTPATIENT)
Dept: PHYSICAL THERAPY | Age: 40
End: 2020-08-06
Payer: COMMERCIAL

## 2020-08-06 PROCEDURE — 97161 PT EVAL LOW COMPLEX 20 MIN: CPT | Performed by: PHYSICAL THERAPIST

## 2020-08-06 PROCEDURE — 97535 SELF CARE MNGMENT TRAINING: CPT | Performed by: PHYSICAL THERAPIST

## 2020-08-06 NOTE — PROGRESS NOTES
is complete. No Radiologist dictation. Please follow up with ordering provider. Mri Knee Left Wo Contrast    Result Date: 7/10/2020  Patient MRN: [id number] : 1980 Age:  47 years Gender: Female Order Date: 7/10/2020 6:16 PM Exam: MRI KNEE LEFT WO CONTRAST Number of Images: 391 views Indication: Left knee pain following injury one month earlier. History of prior cruciate ligament repair more than 10 years earlier. Comparison: Left knee radiographs 2020 TECHNIQUE: The patient was evaluated and 1.5 [employer] magnet without gadolinium contrast. Multiplanar, multisequence imaging was performed. Findings: There is increased signal along the medial collateral ligament, and there is very subtle medial subluxation of the body of the medial meniscus relative the overlying and underlying bony structures. The appearance is suspicious for a medial collateral sprain and possible disruption of the femoral meniscal fascicle. Extra-articular soft tissues show normal appearance of the quadriceps and patellar tendons and the lateral collateral complex. Patellar retinacular structures are intact. There is some increased proton-density signal in the prepatellar soft tissues along the patellar tendon. Patellofemoral and tibial-femoral condylar surfaces appear intact. Skeletal signal shows Intra-articular anatomy shows evidence of prior anterior cruciate ligament repair, which appears uncomplicated, and the posterior cruciate ligament is intact. Metallic artifacts related to the ACL repair are noted in the anterior tibia and medial femoral metaphyseal region. There is a joint effusion distending the suprapatellar bursal region. There is no evidence of a popliteal cyst. The medial meniscus is normal in appearance. The lateral meniscus is normal in appearance. Probable medial collateral sprain with joint effusion, but menisci, cruciate ligaments, and skeletal structures are unremarkable.        Past Medical History:  Past Medical History:   Diagnosis Date    ADHD     Depression     controlled with med, per patient    PONV (postoperative nausea and vomiting)     Tear of left meniscus as current injury      Past Surgical History:   Procedure Laterality Date    ANTERIOR CRUCIATE LIGAMENT REPAIR Left 1998    ANTERIOR CRUCIATE LIGAMENT REPAIR Left 7/23/2020    LEFT KNEE ARTHROSCOPY MEDIAL MENISCUS ROOT REPAIR POSSIBLE REVISION ACL RECONSTRUCTION WITH ALLOGRAFT performed by Zahraa Velasco [facility] at [facility] Right 4/2015    DILATION AND CURETTAGE OF UTERUS  2012    OTHER SURGICAL HISTORY Right 11/18/15    RIGHT SHOULDER MANIPULATION & ARTHROSCOPY    ROTATOR CUFF REPAIR  6/10/15    right rotator cuff open revision    ROTATOR CUFF REPAIR  4/2015       Medications:   Current Outpatient Medications   Medication Sig Dispense Refill    amphetamine-dextroamphetamine (ADDERALL) 20 MG tablet Take 20 mg by mouth Daily with lunch.  ARIPiprazole (ABILIFY) 5 MG tablet Take 5 mg by mouth nightly       lamoTRIgine (LAMICTAL) 200 MG tablet Take 200 mg by mouth nightly       VYVANSE 70 MG capsule Take 70 mg by mouth every morning.  sertraline (ZOLOFT) 50 MG tablet Take 50 mg by mouth nightly       Multiple Vitamins-Minerals (THERAPEUTIC MULTIVITAMIN-MINERALS) tablet Take 1 tablet by mouth daily       No current facility-administered medications for this visit. 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     Exercise regimen: sports, coaching, ref basketball    Hobbies: swimming, sports, writing    Patient Goals: pain relief, return to prior activity, get back to normal    Precautions/Contraindications: recent surgery, NWB c B crutches    OBJECTIVE:     Observations: normal orthopedic exam, well nourished female, normal affect    Inspection: normal orthopedic exam    Edema: moderate global    Gait: NWB L LE B crutches, PWB for transfers    Joint/Motion:    Knee:  Right:    AROM: 135° Flexion,  0° Extension    Left:   AROM: 82° Flexion,  -5° Extension    Strength:    Knee:   Right: Hip: 4+/5 globally, Knee: Flexion 4+/5,  Extension 4+/5  Left: Hip: 4-/5 globally, Knee: Flexion 3/5,  Extension 3/5 (not formally tested)    Palpation: Tender to palpation medial joint line, moderate palpable edema present       Special Tests/Functional Screens:  NT d/t post op      comments: good ROM and strength for post op timeline, educated on the need to abide by restrictions      ASSESSMENT     Outcome Measure:   Lower Extremity Functional Scale (LEFS) 93% impairment    Problems:    Pain reported 0-7/10   ROM decreased    Strength decreased    Decreased functional ability with walking, stairs, sitting, standing, ADLs, use of left lower extremity, bending, reaching, lifting, carrying, transfers    Reason for Skilled Care: Pt reports to PT following L knee arthroscopy c medial meniscal root repair and microfracture to medial femoral condyle. She is doing well overall and has been somewhat compliant c restrictions. She requires skilled care to improve global ROM, strength, stability, and overall fxn mobility needed for ADL, rec, and eventual return to work activity. If she is compliant c HEP and restrictions and has maximized skilled services before restrictions are lifted, she will transition to [facility] care until restrictions are lifted. [x] There are no barriers affecting plan of care or recovery    [] Barriers to this patient's plan of care or recovery include.     Domestic Concerns:  [x] No  [] Yes:    Short Term goals (3 weeks)   Decrease reported pain to 4/10   Increase ROM to 0-90    Increase Strength to 4-/5 globally    Able to perform/complete the following functions/tasks: Pt will ambulate 1 min  c B crutches NWB c minor limitation and minor pain, able to negotiate a flight of stairs nonrecip NWB c minor pain and limitation c single HR, able to perform 3 STS transfers c B HHA NWB and minor limitation.  Compliant c restrictions   Lower Extremity Functional Scale (LEFS) 75% impairment    Long Term goals (6 weeks)   Decrease reported pain to 2/10   Increase ROM to 0-120   Increase Strength to 4+/5 globally    Able to perform/complete the following functions/tasks: Pt will ambulate for 20 min s AD or limitation, able to negotiate a flight of stairs recip c minor pain, limitation, and with single HR, able to perform 10 STS transfers s pain or limitation and c single HHA   LEFS 40% impairment    Independent with Home Exercise Programs    Rehab Potential: [x] Good  [] Fair  [] Poor    PLAN       Treatment Plan:   [x] Therapeutic Exercise  [x] Therapeutic Activity  [x] Neuromuscular Re-education   [x] Gait Training  [x] Balance Training  [x] Aerobic conditioning  [x] Manual Therapy  [x] Massage/Fascial release   [] Work/Sport specific activities    [] Pain Neuroscience [x] Cold/hotpack  [x] Vasocompression  [x] Electrical Stimulation  [] Lumbar/Cervical Traction  [x] Ultrasound   [] Iontophoresis: 4 mg/mL Dexamethasone Sodium Phosphate 40-80 mAmin  [] Dry Needling      [x] Instruction in HEP      []  Medication allergies reviewed for use of Dexamethasone Sodium Phosphate 4mg/ml  with iontophoresis treatments. Patient is not allergic. The following CPT codes are likely to be used in the care of this patient: 48522 PT Evaluation: Low Complexity , 69395 PT Re-Evaluation , 50323 Therapeutic Exercise , 98934 Neuromuscular Re-Education , 60252 Therapeutic Activities , 37781 Manual Therapy , 37447 Group Therapy , 15981 Gait Training , 02725 Vasopneumatic Device ,  Electrical Stimulation      Suggested Professional Referral: [x] No  [] Yes:     Patient Education:  [x] Plans/Goals, Risks/Benefits discussed  [x] Home exercise program  Method of Education: [x] Verbal  [x] Demo  [x] Written  Comprehension of Education:  [x] Verbalizes understanding. [x] Demonstrates understanding.   [] Needs Review. [] Demonstrates/verbalizes understanding of HEP/Ed previously given. Frequency: 1-2 days per week for 6 weeks    Patient understands diagnosis/prognosis and consents to treatment, plan and goals: [x] Yes    [] No     Thank you for the opportunity to work with your patient. If you have questions or comments, please contact me at numbers listed above. Electronically signed by: Kenneth Mayfield, PT PT DPT [id number]         Please sign Physician's Certification and return to: [address] [place] [address] [address]  [address] [place] [address]   Dept: [phone]  Dept Fax: [phone] PT , DPT PT [id number]    Physician's Certification / Comments     Frequency/Duration 1-2 days per week for 6 weeks. Certification period from 8/6/2020  to 9/25/20. I have reviewed the Plan of Care established for skilled therapy services and certify that the services are required and that they will be provided while the patient is under my care.     Physician's Comments/Revisions:               Physician's Printed Name:                                           [de-identified] Signature:                                                               Date:

## 2020-08-06 NOTE — PROGRESS NOTES
[address] [facility]   Phone: [phone]   Fax: [phone]      Physical Therapy Daily Treatment Note    Date: 2020  Patient Name: Ferdie Ahumada  : 1980   MRN: [id number]  DOInjury: 2020   DOSx: 20  Referring Provider: Cortney Turner MD  [address]     Medical Diagnosis:     B41.401 (ICD-10-CM) - Status post anterior cruciate ligament surgery   M25.562 (ICD-10-CM) - Acute pain of the Left Knee    Outcome Measure: LEFS 93%    S: see eval  O:   Time [phone]     Visit  Repeat outcome measure at mid point and end. Pain 0-8/10     Strength Right: Hip: 4+/5 globally, Knee: Flexion 4+/5,  Extension 4+/5  Left: Hip: 4-/5 globally, Knee: Flexion 3/5,  Extension 3/5 (not formally tested)     Palpation Tender to palpation medial joint line, moderate palpable edema present      ROM Right:    AROM: 135° Flexion,  0° Extension     Left:   AROM: 82° Flexion,  -5° Extension     Education      Edema management, brace management, restrictions, HEP 10 min  SCHM   Modalities            Manual            Stretch      IT band supine      HS supine      Quad Prone      Exercise      Bike      Heel slides x30     QS 5 sec hold x 30     SLR      SAQ      LAQ      Hamstring Curl       TG squats      TG calf raises      Step-ups - FWD      Step-ups - LAT      Step-ups - BWD        NMR To improve balance for safe community and home ambulation    Resisted walk      FWD      BKWD      lat      March      Side stepping      Retro walk      Heel to toe      A:  Tolerated well. Above added to written HEP.   P: Continue with rehab plan  Brianda Townsend, PT DPT, PT [id number]    Treatment Charges: Mins Units   Initial Evaluation 20 1   Re-Evaluation     Ther Exercise         TE     Manual Therapy     MT     Ther Activities        TA     Gait Training          GT     Neuro Re-education NR     Modalities     Non-Billable Service Time     SCHM 10 1   Total Time/Units 30 2

## 2020-08-07 RX ORDER — HYDROCODONE BITARTRATE AND ACETAMINOPHEN 5; 325 MG/1; MG/1
1 TABLET ORAL EVERY 6 HOURS PRN
Qty: 20 TABLET | Refills: 0 | Status: SHIPPED | OUTPATIENT
Start: 2020-08-07 | End: 2020-08-12

## 2020-08-07 NOTE — TELEPHONE ENCOUNTER
Pt is requesting a refill on her norco.      Surgery: Left knee arthroscopy, medial meniscus root repair, microfracture of medial femoral condyle 4x4 mm grade IV defect  Date: 7/23/2020     2nd refill      Ordered and pended

## 2020-08-11 ENCOUNTER — TREATMENT (OUTPATIENT)
Dept: PHYSICAL THERAPY | Age: 40
End: 2020-08-11
Payer: COMMERCIAL

## 2020-08-11 PROCEDURE — 97016 VASOPNEUMATIC DEVICE THERAPY: CPT

## 2020-08-11 PROCEDURE — 97110 THERAPEUTIC EXERCISES: CPT

## 2020-08-11 NOTE — PROGRESS NOTES
[address] [facility]   Phone: [phone]   Fax: [phone]      Physical Therapy Daily Treatment Note    Date: 2020  Patient Name: Casimiro Pablo  : 1980   MRN: [id number]  DOInjury: 2020   DOSx: 20  Referring Provider: Carl Worley MD  [address]  [facility]     Medical Diagnosis:     Q65.800 (ICD-10-CM) - Status post anterior cruciate ligament surgery   M25.562 (ICD-10-CM) - Acute pain of the Left Knee    Outcome Measure: LEFS 93%    S: Patient reports to therapy and states she is tired this morning. O:   Time [id number]     Visit  Repeat outcome measure at mid point and end. Pain 0-8/10     Strength Right: Hip: 4+/5 globally, Knee: Flexion 4+/5,  Extension 4+/5  Left: Hip: 4-/5 globally, Knee: Flexion 3/5,  Extension 3/5 (not formally tested)     Palpation Tender to palpation medial joint line, moderate palpable edema present      ROM Right:    AROM: 135° Flexion,  0° Extension     Left:   AROM: 82° Flexion,  -5° Extension     Education      Edema management, brace management, restrictions, HEP 10 min  SCHM   Modalities      Vasopneumatic device   10 mins  Low Pressure 63304   Manual            Stretch      IT band supine      HS supine      Quad Prone      Exercise      Bike      Heel slides 5s holds x30  TE   QS 5 sec hold x 30  TE   SLR  -Flex/Abd/Add  3 x 10 c brace locked in extension  TE   SAQ      LAQ      Hamstring Curl       TG squats      TG calf raises      Step-ups - FWD      Step-ups - LAT      Step-ups - BWD        NMR To improve balance for safe community and home ambulation    Resisted walk      FWD      BKWD      lat      March      Side stepping      Retro walk      Heel to toe      A:  Tolerated well. She reports to therapy c use of  B crutches and demonstrates proper NWB status. Patient followed precautions during treatment session and exhibited moderate fatigue.  Progressed c SLR this session c brace on and locked in extension. Vasopneumatic device was utilized in order to further improve swelling and reduce discomfort. Will continue to progress as protocol allows.     P: Continue with rehab plan  Ioana Roberts, PTA  [id number]    Treatment Charges: Mins Units   Initial Evaluation     Re-Evaluation     Ther Exercise         TE 26 1   Manual Therapy     MT     Ther Activities        TA     Gait Training          GT     Neuro Re-education NR     Modalities 10 1   Non-Billable Service Time     SCHM     Total Time/Units 36 2

## 2020-08-21 ENCOUNTER — TREATMENT (OUTPATIENT)
Dept: PHYSICAL THERAPY | Age: 40
End: 2020-08-21
Payer: COMMERCIAL

## 2020-08-21 PROCEDURE — 97110 THERAPEUTIC EXERCISES: CPT

## 2020-08-21 PROCEDURE — 97016 VASOPNEUMATIC DEVICE THERAPY: CPT

## 2020-08-26 ENCOUNTER — OFFICE VISIT (OUTPATIENT)
Dept: ORTHOPEDIC SURGERY | Age: 40
End: 2020-08-26

## 2020-08-26 ENCOUNTER — TREATMENT (OUTPATIENT)
Dept: PHYSICAL THERAPY | Age: 40
End: 2020-08-26
Payer: COMMERCIAL

## 2020-08-26 VITALS — BODY MASS INDEX: 23.92 KG/M2 | TEMPERATURE: 97.4 F | WEIGHT: 135 LBS | HEIGHT: 63 IN

## 2020-08-26 PROCEDURE — 99024 POSTOP FOLLOW-UP VISIT: CPT | Performed by: ORTHOPAEDIC SURGERY

## 2020-08-26 PROCEDURE — 97110 THERAPEUTIC EXERCISES: CPT | Performed by: PHYSICAL THERAPIST

## 2020-08-26 PROCEDURE — 97530 THERAPEUTIC ACTIVITIES: CPT | Performed by: PHYSICAL THERAPIST

## 2020-08-26 NOTE — PROGRESS NOTES
[address] [facility]   Phone: [phone]   Fax: [phone]      Physical Therapy Daily Treatment Note    Date: 2020  Patient Name: Stacey Moore  : 1980   MRN: [id number]  DOInjury: 2020   DOSx: 20  Referring Provider: Ayanna Murillo MD  [address] [facility]     Medical Diagnosis:     S05.312 (ICD-10-CM) - Status post anterior cruciate ligament surgery   M25.562 (ICD-10-CM) - Acute pain of the Left Knee    Outcome Measure: LEFS 77%    S: Pt reports that she had a good follow up c surgeon this morning and was cleared to begin WBAT and weaning from crutches and brace over the next 2 weeks. O:   Time [id number]     Visit  Repeat outcome measure at mid point and end. Pain 0-8/10     Strength Right: Hip: 4+/5 globally, Knee: Flexion 4+/5,  Extension 4+/5  Left: Hip: 4-/5 globally, Knee: Flexion 3/5,  Extension 3/5 (not formally tested)     Palpation Tender to palpation medial joint line, moderate palpable edema present      ROM Right:    AROM: 135° Flexion,  0° Extension     Left:   AROM: 82° Flexion,  -5° Extension     Education       [facility]   Modalities      Vasopneumatic device   10 mins  Low Pressure 16265         POST OP TIMELINE - 4 weeks post op  9/3- 6 weeks post op            Gait training 5 min  B crutches/ single crutch training TA   Stretch      IT band supine      HS supine      Quad Prone      Exercise      Bike      Heel slides 2x10 10s holds c strap AAROM TE   Heel prop x3 min 3 lbs  TE   QS 5 sec hold x 30 c heel prop TE     TE   SAQ      LAQ      Hamstring Curl       TG squats      TG calf raises      Step-ups - FWD 3x10 foam L  TA   Step-ups - LAT 3x10 foam L  TA   Step-ups - BWD        NMR To improve balance for safe community and home ambulation    Resisted walk      FWD      BKWD      lat      March      Side stepping      Retro walk      Heel to toe      A:  Tolerated well.   Pt is progressing well and was able to ambulate c B crutches and single crutch c good stability and mechanics. She is missing end range knee extension and was educated on the need to perform HEP focusing on this motion. She was agreeable. She will continue c PT 2x weekly for additional 6 visits and will wean from crutches and brace over the next 2 weeks.     P: Continue with rehab plan  Ayden Price, PT  DPT [id number]    Treatment Charges: Mins Units   Initial Evaluation     Re-Evaluation     Ther Exercise         TE 25 2   Manual Therapy     MT     Ther Activities        TA 15 1   Gait Training          GT     Neuro Re-education NR     Modalities     Non-Billable Service Time     [facility]     Total Time/Units 40 3

## 2020-08-26 NOTE — PROGRESS NOTES
Follow Up Post Operative Visit     Surgery: Left knee arthroscopy, medial meniscus root repair, microfracture of medial femoral condyle 4x4 mm grade IV defect  Date: 7/23/2020    Subjective:    Hue Marc is here for follow up visit s/p above procedure. She is doing well. She has been making good progress with PT     Controlled Substances Monitoring:        Physical Exam:    Height: 5' 3\" (1.6 m), Weight: 135 lb (61.2 kg)    General: Alert and oriented x3, no acute distress  Cardiovascular/pulmonary: No labored breathing, peripheral perfusion intact  Musculoskeletal:    On exam of the knee, range of motion is full. Knee is stable. There is mild medial joint line tenderness. No swelling. Incisions are healed      Imaging: No new imaging. Previous images reviewed    Assessment and Plan:    Status post left knee medial meniscus root repair July 23, doing well  She is making good progress. We discussed restrictions. She can unlock her brace and weight-bear as tolerated and wean herself from crutches. Over the next 2 weeks she can wean herself out of her brace.   Follow-up in 6 weeks    Hermilo Miranda MD  Orthopaedic Surgery   8/26/20  8:13 AM

## 2020-08-31 NOTE — PROGRESS NOTES
[facility] PRE-ADMISSION TESTING INSTRUCTIONS    The Preadmission Testing patient is instructed accordingly using the following criteria (check applicable):    ARRIVAL INSTRUCTIONS:  [x] Parking the day of Surgery is located in the Main Entrance lot. Upon entering the door, make an immediate right to the surgery reception desk    [] [id number] is available Monday through Friday 6 am to 6 pm    [x] Bring photo ID and insurance card    [] Bring in a copy of Living will or Durable Power of  papers. [x] Please be sure to arrange for responsible adult to provide transportation to and from the hospital    [x] Please arrange for responsible adult to be with you for the 24 hour period post procedure due to having anesthesia      GENERAL INSTRUCTIONS:    [x] Nothing by mouth after midnight, including gum, candy, mints or water    [x] You may brush your teeth, but do not swallow any water    [] Take medications as instructed with 1-2 oz of water    [x] Stop herbal supplements and vitamins 5 days prior to procedure    [] Follow preop dosing of blood thinners per physician instructions    [] Take 1/2 dose of evening insulin, but no insulin after midnight    [] No oral diabetic medications after midnight    [] If diabetic and have low blood sugar or feel symptomatic, take 1-2oz apple juice only    [] Bring inhalers day of surgery    [] Bring C-PAP/ Bi-Pap day of surgery    [x] Bring urine specimen day of surgery    [x] Shower or bath with soap, lather and rinse well, AM of Surgery, no lotion, powders or creams to surgical site    [] Follow bowel prep as instructed per surgeon    [x] No tobacco products within 24 hours of surgery     [x] No alcohol or illegal drug use within 24 hours of surgery.     [x] Jewelry, body piercing's, eyeglasses, contact lenses and dentures are not permitted into surgery (bring cases)      [x] Please do not wear any nail polish, make up or hair [facility] Evaluation    CHIEF COMPLAINT    Hypoxia, abnormal CT     Refered by:  CHARLES Monroe      HISTORY OF PRESENT ILLNESS    Lasahnda Qureshi is a 87 y.o.female here today for follow-up on some hypoxemia noted in the office with Mr. Johnson as well as emergency department visit with a abnormal CT.    She saw her PCP on August 14 due to worsening shortness of air.  She was found to have some low oxygen levels.  She was also quite hypertensive so they sent her to the emergency department for work-up.  In the ER her oxygen levels remain normal.  She does states she has had quite a bit of anxiety lately.  Especially with her being in quarantine and not being able to be socially interactive she has noticed quite a bit of worsening anxiety and even panic attacks at times.  She attributes the high blood pressure to the anxiety.    She denies a cough or sputum production.  When she was seen here in the office in 2016 by Dr. Raza Chu for a cough.  She did have a CT scan done at that time that did show some inflammatory process with a mosaic pattern, interstitial pneumonitis.  She did undergo bronchoscopy that showed organizing pneumonitis.  At that time she did complete a round of steroids.  She was intolerant to azathioprine.    She had no recurrent symptoms until this recent episode of some's worsening shortness of air.    She has no cough or sputum production.  No chest tightness or wheezing.  No fevers or chills.       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 difficile diarrhea   • Cervical pain   • Chronic midline low back pain without sciatica   • Thoracogenic scoliosis of thoracic region   • Insomnia   • Localized edema   • Malignant tumor of urinary bladder (CMS/HCC)   • Acute hypoxemic respiratory failure (CMS/HCC)   • Acute diastolic CHF (congestive heart failure) (CMS/HCC)   • Hypertensive urgency       Allergies   Allergen Reactions   • Antihistamines, Loratadine-Type Other (See Comments)     Drowsiness     • Erythromycin        Current Outpatient Medications:   •  azelastine (ASTELIN) 0.1 % nasal spray, 2 sprays into the nostril(s) as directed by provider 2 (Two) Times a Day., Disp: 1 each, Rfl: 12  •  Cholecalciferol (VITAMIN D3) 2000 UNITS tablet, Take  by mouth. Take 2 tablets daily., Disp: , Rfl:   •  Cyanocobalamin (VITAMIN B12 PO), Take  by mouth. Take 1 tablet daily as directed., Disp: , Rfl:   •  Elastic Bandages & Supports (FUTURO FIRM COMPRESSION HOSE) misc, 1 package Daily., Disp: 1 each, Rfl: 1  •  ESTROGENS CONJ SYNTHETIC B PO,         Compound Bi-Est 20-20 Topical  (Estriol 80% + Estradiol 20%) apply a  pea sized amt to the vulva 2-3 nights per week, Disp: , Rfl:   •  latanoprost (XALATAN) 0.005 % ophthalmic solution, Apply  to eye. Instill 1 drop into affected eye(s) once daily as directed., Disp: , Rfl:   •  metroNIDAZOLE (METROGEL VAGINAL) 0.75 % vaginal gel, Metrogel Vaginal 0.75 %  Insert 1 applicatorful every day by vaginal route for 7 days., Disp: , Rfl:   •  nystatin (MYCOSTATIN) 900821 UNIT/GM ointment, nystatin 100,000 unit/gram topical ointment, Disp: , Rfl:   •  triamcinolone (KENALOG) 0.1 % ointment, triamcinolone acetonide 0.1  "% topical ointment, Disp: , Rfl:   •  VENTOLIN  (90 Base) MCG/ACT inhaler, Inhale 2 puffs Every 6 (Six) Hours As Needed., Disp: , Rfl: 1  •  fluticasone (Veramyst) 27.5 MCG/SPRAY nasal spray, 2 sprays into the nostril(s) as directed by provider Daily., Disp: 10 g, Rfl: 3    MEDICATION LIST AND ALLERGIES REVIEWED.    Social History     Tobacco Use   • Smoking status: Former Smoker   • Smokeless tobacco: Never Used   • Tobacco comment: Quit 41 years ago   Substance Use Topics   • Alcohol use: No   • Drug use: No       FAMILY AND SOCIAL HISTORY REVIEWED AND UPDATED IN EPIC    Review of Systems   Constitutional: Negative for chills, fatigue, fever and unexpected weight change.   HENT: Negative for congestion, nosebleeds, postnasal drip, rhinorrhea, sinus pressure and trouble swallowing.    Respiratory: Positive for shortness of breath. Negative for cough, chest tightness and wheezing.    Cardiovascular: Negative for chest pain and leg swelling.   Gastrointestinal: Negative for abdominal pain, constipation, diarrhea, nausea and vomiting.   Genitourinary: Negative for dysuria, frequency, hematuria and urgency.   Musculoskeletal: Negative for myalgias.   Neurological: Negative for dizziness, weakness, numbness and headaches.   Psychiatric/Behavioral: The patient is nervous/anxious.    All other systems reviewed and are negative.  .    /80   Pulse 75   Temp 98.2 °F (36.8 °C)   Ht 170.2 cm (67\")   Wt 61.8 kg (136 lb 3.2 oz)   LMP  (LMP Unknown)   SpO2 95% Comment: resting, room air  Breastfeeding No   BMI 21.33 kg/m²   Immunization History   Administered Date(s) Administered   • Fluzone High Dose =>65 Years ([employer] ONLY) 10/13/2016, 10/11/2017, 10/10/2018, 10/29/2019   • Hepatitis A 10/10/2018   • Influenza, Unspecified 10/13/2016   • Pneumococcal Conjugate 13-Valent (PCV13) 01/01/2013   • Pneumococcal Polysaccharide (PPSV23) 05/01/2006   • Tdap 01/01/2014       Physical Exam   Constitutional: She is " oriented to person, place, and time. She appears well-developed and well-nourished.   HENT:   Head: Normocephalic and atraumatic.   Eyes: Pupils are equal, round, and reactive to light. EOM are normal.   Neck: Normal range of motion. Neck supple.   Cardiovascular: Normal rate and regular rhythm.   No murmur heard.  Pulmonary/Chest: Effort normal. No respiratory distress. She has no wheezes. She has rales (bilateral lower lobes).   Abdominal: Soft. Bowel sounds are normal. She exhibits no distension.   Musculoskeletal: Normal range of motion. She exhibits no edema.   Neurological: She is alert and oriented to person, place, and time.   Skin: Skin is warm and dry. No erythema.   Psychiatric: She has a normal mood and affect. Her behavior is normal.   Vitals reviewed.      RESULTS    Lab Results   Component Value Date    WBC 4.87 08/14/2020    HGB 13.6 08/14/2020    HCT 41.7 08/14/2020    MCV 95.2 08/14/2020     08/14/2020     Lab Results   Component Value Date    GLUCOSE 104 (H) 08/14/2020    CALCIUM 9.2 08/14/2020     08/14/2020    K 3.3 (L) 08/14/2020    CO2 28.0 08/14/2020     08/14/2020    BUN 11 08/14/2020    CREATININE 0.65 08/14/2020    EGFRIFNONA 87 08/14/2020    BCR 16.9 08/14/2020    ANIONGAP 11.0 08/14/2020         EXAMINATION: CT ANGIOGRAM CHEST - 08/14/2020     INDICATION: Worsening dyspnea.      TECHNIQUE: Spiral acquisition 3 mm post IV contrast images through the  chest with sagittal and coronal 2 mm 2D reconstructions.     The radiation dose reduction device was turned on for each scan per the  ALARA (As Low as Reasonably Achievable) protocol.     COMPARISON: 07/19/2017 chest radiograph     FINDINGS: Patient history indicates increasing dyspnea. History of  congestive heart failure.     There is good contrast opacification of the pulmonary arteries. No  filling defects are seen to suggest pulmonary embolic disease. The  ascending thoracic aorta is ectatic, to approximately 4.0 cm.  There is  no evidence of dissection. No pericardial or pleural effusion and no  mediastinal, hilar, or axillary adenopathy is seen.     Lung window images show mild chronic-appearing subpleural changes of the  right lung, presumably postinflammatory scarring in a mild mosaic  enhancement pattern to the lungs which could reflect some underlying  chronic lung disease such as centrilobular emphysema on an angiographic  CT scan. Differential would include a mild atypical pneumonia, which is  considered less likely. Features are indeterminate for Covid 19  pneumonia.     Included images of the upper abdomen show fatty liver change. No  significant abnormalities are seen of the visualized portions of the  spleen, pancreatic tail, adrenal glands, or upper renal poles. Lower  thoracic and upper lumbar degenerative disc changes are noted but no  evidence of acute bony trauma is seen.     IMPRESSION:  1. No evidence of pulmonary embolic disease.  2. Mild diffuse interstitial disease pattern of the lungs, possibly  chronic. Low-level changes of an atypical pneumonia might have this  appearance as well. No other evidence of active disease elsewhere.     DICTATED:   08/14/2020  EDITED/ls :   08/14/2020     This report was finalized on 8/15/2020 10:31 PM by Dr. Harish Espino MD.         PROBLEM LIST    Problem List Items Addressed This Visit        Respiratory    Pneumonitis    Overview     Description: A.  Status post bronchoscopy 01/27/2016 with pathology consistent with changes of organizing pneumonitis.         Relevant Medications    fluticasone (Veramyst) 27.5 MCG/SPRAY nasal spray    Moderate persistent asthma - Primary    Relevant Orders    Walking Oximetry (Completed)       Other    Abnormal computed tomography scan            DISCUSSION    We did review her CT scan of her chest that appears to be the chronic changes of her mosaic pattern from 2016.  She has done very well since then off any therapy.  At this time with her  occasional worsening shortness of air been going to put her back on an inhaled corticosteroid.  She has completed a Medrol Dosepak.    She has no evidence of hypoxemia on today's 6-minute walk test.  I will also follow-up with an overnight oximetry study as well, to rule out any nocturnal hypoxemia.    She does admit that she feels like most of her symptoms are related to some worsening anxiety secondary to social isolation from the epidemic.    I will have her follow-up in 4 to 6 weeks to see how she is doing.      CHARLES Jones  08/31/202011:52 AM  Electronically signed     Please note that portions of this note were completed with a voice recognition program. Efforts were made to edit the dictations, but occasionally words are mistranscribed.      CC: Leandro Johnson APRN

## 2020-09-01 ENCOUNTER — TREATMENT (OUTPATIENT)
Dept: PHYSICAL THERAPY | Age: 40
End: 2020-09-01
Payer: COMMERCIAL

## 2020-09-01 PROCEDURE — 97116 GAIT TRAINING THERAPY: CPT | Performed by: PHYSICAL THERAPIST

## 2020-09-01 PROCEDURE — 97530 THERAPEUTIC ACTIVITIES: CPT | Performed by: PHYSICAL THERAPIST

## 2020-09-01 PROCEDURE — 97110 THERAPEUTIC EXERCISES: CPT | Performed by: PHYSICAL THERAPIST

## 2020-09-04 ENCOUNTER — TREATMENT (OUTPATIENT)
Dept: PHYSICAL THERAPY | Age: 40
End: 2020-09-04
Payer: COMMERCIAL

## 2020-09-04 PROCEDURE — 97116 GAIT TRAINING THERAPY: CPT | Performed by: PHYSICAL THERAPIST

## 2020-09-04 PROCEDURE — 97110 THERAPEUTIC EXERCISES: CPT | Performed by: PHYSICAL THERAPIST

## 2020-09-04 PROCEDURE — 97530 THERAPEUTIC ACTIVITIES: CPT | Performed by: PHYSICAL THERAPIST

## 2020-09-04 NOTE — PROGRESS NOTES
[address] [facility] [address] [facility]   Phone: [phone]   Fax: [phone]      Physical Therapy Daily Treatment Note    Date: 2020  Patient Name: Manjula Casillas  : 1980   MRN: [id number]  DOInjury: 2020   DOSx: 20  Referring Provider: Jocelyn Blank MD  [address] [facility]     Medical Diagnosis:     L32.033 (ICD-10-CM) - Status post anterior cruciate ligament surgery   M25.562 (ICD-10-CM) - Acute pain of the Left Knee    Outcome Measure: LEFS 77%    S: Pt reports that she had a good follow up c surgeon this morning and was cleared to begin WBAT and weaning from crutches and brace over the next 2 weeks. O:   Time 0318-3413     Visit  Repeat outcome measure at mid point and end.     Pain 4/10     Strength Right: Hip: 4+/5 globally, Knee: Flexion 4+/5,  Extension 4+/5  Left: Hip: 4-/5 globally, Knee: Flexion 3/5,  Extension 3/5 (not formally tested)     Palpation Tender to palpation medial joint line, moderate palpable edema present      ROM Right:    AROM: 135° Flexion,  0° Extension     Left:   PROM: 131° Flexion,  -5° Extension     Education       [facility]   Modalities      Vasopneumatic device   10 mins  Low Pressure 80819         POST OP TIMELINE - 4 weeks post op  9/3- 6 weeks post op            Gait training 10 min  No crutch with brace TA   Stretch      IT band supine      HS supine      Quad Prone      Exercise      Bike      Heel slides 2x10 10s holds c strap AAROM TE   Heel prop x4 min 5 lbs  TE   QS  c heel prop TE   Hamstring Stretch Long Sit 10 reps x 1 set        TE   SAQ      LAQ      Hamstring Curl       TG squats xx     Eccentric Heel Taps xx     Terminal Knee Ext with Red Band xx     TG calf raises      Mini Squats 15 reps x 2 sets     Step-ups - FWD 3x10 5\" step + foam L  TA   Step-ups - LAT 3x10 5\" step + foam L  TA   Step-ups - BWD        NMR To improve balance for safe community and home ambulation    Resisted walk      FWD      BKWD lat      March      Side stepping      Retro walk      Heel to toe      A:  Tolerated well. Pt displayed occasional instability during gait but was able to self-correct without assist with brace donned with no crutch. Pt was able to temporarily return demonstration regarding walking mechanics with additional instruction necessary for reinforcement.     P: Continue with rehab plan  Job Namitas, PT  DPT    Treatment Charges: Mins Units   Initial Evaluation     Re-Evaluation     Ther Exercise         TE 20 1   Manual Therapy     MT     Ther Activities        TA 10 1   Gait Training          GT 10 1   Neuro Re-education NR     Modalities     Non-Billable Service Time     [facility]     Total Time/Units 40 3

## 2020-09-08 ENCOUNTER — TREATMENT (OUTPATIENT)
Dept: PHYSICAL THERAPY | Age: 40
End: 2020-09-08
Payer: COMMERCIAL

## 2020-09-08 PROCEDURE — 97530 THERAPEUTIC ACTIVITIES: CPT | Performed by: PHYSICAL THERAPIST

## 2020-09-08 PROCEDURE — 97110 THERAPEUTIC EXERCISES: CPT | Performed by: PHYSICAL THERAPIST

## 2020-09-08 NOTE — PROGRESS NOTES
[facility]   Phone: [phone]   Fax: [phone]      Physical Therapy Daily Treatment Note    Date: 2020  Patient Name: Ryan Crooks  : 1980   MRN: [id number]  DOInjury: 2020   DOSx: 20  Referring Provider: Shaheed Spaulding MD  [facility]     Medical Diagnosis:     L40.848 (ICD-10-CM) - Status post anterior cruciate ligament surgery   M25.562 (ICD-10-CM) - Acute pain of the Left Knee    Outcome Measure: LEFS 77%    S: Pt reports that she was active over the weekend and her knee swelled up over the weekend but that it feels better today but still has some pain. O:   Time 1393-9189     Visit  Repeat outcome measure at mid point and end.     Pain 4/10     Strength Right: Hip: 4+/5 globally, Knee: Flexion 4+/5,  Extension 4+/5  Left: Hip: 4-/5 globally, Knee: Flexion 3/5,  Extension 3/5 (not formally tested)     Palpation Tender to palpation medial joint line, moderate palpable edema present      ROM Right:    AROM: 135° Flexion,  0° Extension     Left:   PROM: 131° Flexion,  -5° Extension     Education       [facility]   Modalities      Vasopneumatic device    Low Pressure 21123         POST OP TIMELINE - 4 weeks post op  9/3- 6 weeks post op            Gait training TA   Stretch      IT band supine      HS supine      Quad Prone      Exercise      Bike      Heel slides 2x10 10s holds c strap AAROM TE   Heel prop x4 mins  TE   QS  c heel prop TE   Hamstring Stretch Long Sit 10 reps x 1 set        TE   SAQ      LAQ      Hamstring Curl       TG squats      Eccentric Heel Taps      Terminal Knee Ext with Red Band      TG calf raises      Mini Squats      Step-ups - FWD 3x10 4\" step Pain noted on the L TA   Step-ups - LAT   TA   Step-ups - BWD      SLS on foam 5 reps 1 set with 20 second holds        NMR To improve balance for safe community and home ambulation    Resisted walk      FWD      BKWD      lat 15 reps x 1 set each  With red band    March      Side stepping      Retro walk      Heel to toe      A:  Pt would occasionally get a sharp pain in front of knee if overextending herself and during heel prompt so actions were termination. Pt was instructed that mild discomfort is to be expected but she should not be doing activities that recreate her worst pain level.    P: Continue with rehab plan  Keeley Blackburn PT  DPT    Treatment Charges: Mins Units   Initial Evaluation     Re-Evaluation     Ther Exercise         TE 30 2   Manual Therapy     MT     Ther Activities        TA 10 1   Gait Training          GT     Neuro Re-education NR     Modalities     Non-Billable Service Time     SCHM     Total Time/Units 40 3     3

## 2020-09-10 ENCOUNTER — TREATMENT (OUTPATIENT)
Dept: PHYSICAL THERAPY | Age: 40
End: 2020-09-10
Payer: COMMERCIAL

## 2020-09-10 PROCEDURE — 97110 THERAPEUTIC EXERCISES: CPT | Performed by: PHYSICAL THERAPIST

## 2020-09-10 PROCEDURE — 97530 THERAPEUTIC ACTIVITIES: CPT | Performed by: PHYSICAL THERAPIST

## 2020-09-10 NOTE — PROGRESS NOTES
[facility] [facility]   Phone: [phone]   Fax: [phone]      Physical Therapy Daily Treatment Note    Date: 9/10/2020  Patient Name: Rebbecca Cockayne  : 1980   MRN: [id number]  DOInjury: 2020   DOSx: 20  Referring Provider: Laurey Kehr, MD  [address] [facility]     Medical Diagnosis:     T27.226 (ICD-10-CM) - Status post anterior cruciate ligament surgery   M25.562 (ICD-10-CM) - Acute pain of the Left Knee    Outcome Measure: LEFS 77%    S: Pt reports that she was active over the weekend and her knee swelled up over the weekend but that it feels better today but still has some pain. O:   Time 4363-4927     Visit  Repeat outcome measure at mid point and end.     Pain 0/10     Strength Right: Hip: 4+/5 globally, Knee: Flexion 4+/5,  Extension 4+/5  Left: Hip: 4-/5 globally, Knee: Flexion 3/5,  Extension 3/5 (not formally tested)     Palpation Tender to palpation medial joint line, moderate palpable edema present      ROM Right:    AROM: 135° Flexion,  0° Extension     Left:   PROM: 131° Flexion,  -5° Extension     Education       [facility]   Modalities      Vasopneumatic device    Low Pressure 82674         POST OP TIMELINE - 4 weeks post op  9/3- 6 weeks post op            Gait training TA   Stretch      IT band supine      HS supine      Quad Prone      Exercise      Bike      Heel slides 1x10 10s holds c strap AAROM TE   Heel prop x4 mins  TE   QS  c heel prop TE   Hamstring Stretch Long Sit 10 reps x 1 set with 5 second holds light stretch       TE   SAQ      LAQ      Hamstring Curl       TG squats      Eccentric Heel Taps      Terminal Knee Ext with Red Band      TG calf raises      Mini Squats      Step-ups - FWD 3x10 4\" step Pain noted on the L TA   Step-ups - LAT   TA   Step-ups - BWD      SLS on foam 5 reps 1 set with 20 second holds        NMR To improve balance for safe community and home ambulation    Resisted walk      FWD      BKWD lat 15 reps x 1 set each  With red band    March      Side stepping      Retro walk      Heel to toe      A:  Pt would occasionally get a sharp pain in front of knee if overextending herself and during heel prompt so actions were termination. Pt was instructed that mild discomfort is to be expected but she should not be doing activities that recreate her worst pain level.    P: Continue with rehab plan  Kwame Johnston PT  DPT    Treatment Charges: Mins Units   Initial Evaluation     Re-Evaluation     Ther Exercise         TE 30 2   Manual Therapy     MT     Ther Activities        TA 10 1   Gait Training          GT     Neuro Re-education NR     Modalities     Non-Billable Service Time     SCHM     Total Time/Units 40 3     3

## 2020-09-11 ENCOUNTER — OFFICE VISIT (OUTPATIENT)
Dept: ORTHOPEDIC SURGERY | Age: 40
End: 2020-09-11

## 2020-09-11 VITALS — WEIGHT: 135 LBS | TEMPERATURE: 97.8 F | HEIGHT: 63 IN | BODY MASS INDEX: 23.92 KG/M2

## 2020-09-11 PROCEDURE — 99024 POSTOP FOLLOW-UP VISIT: CPT | Performed by: ORTHOPAEDIC SURGERY

## 2020-09-11 NOTE — PROGRESS NOTES
Follow Up Post Operative Visit     Surgery: Left knee arthroscopy, medial meniscus root repair, microfracture of medial femoral condyle 4x4 mm grade IV defect  Date: 7/23/2020    Subjective:    Sanjay Oseguera is here for follow up visit s/p above procedure. She was doing well but unfortunately was standing on a couch to clean and fell and may have injured her knee. Since that time she has made some slow improvements but is having some pain and stiffness      Controlled Substances Monitoring:        Physical Exam:    Height: 5' 3\" (1.6 m), Weight: 135 lb (61.2 kg)    General: Alert and oriented x3, no acute distress  Cardiovascular/pulmonary: No labored breathing, peripheral perfusion intact  Musculoskeletal:    On exam, range of motion is 0 to 120 degrees. There is no joint line tenderness. There is no swelling about the knee. Incisions are healed. Ligamentous exam is stable      Imaging: No new images. Previous images reviewed    Assessment and Plan: She is about 6 weeks out from left knee medial meniscus root repair  She is doing well aside from setback from the fall. I have low suspicion for reinjury at this point. She will continue with PT.   Follow-up in 6 weeks    Blake Tai MD  Orthopaedic Surgery   9/11/20  8:30 AM

## 2020-10-07 ENCOUNTER — TREATMENT (OUTPATIENT)
Dept: PHYSICAL THERAPY | Age: 40
End: 2020-10-07

## 2020-10-23 ENCOUNTER — OFFICE VISIT (OUTPATIENT)
Dept: ORTHOPEDIC SURGERY | Age: 40
End: 2020-10-23
Payer: COMMERCIAL

## 2020-10-23 VITALS — BODY MASS INDEX: 23.92 KG/M2 | HEIGHT: 63 IN | WEIGHT: 135 LBS | TEMPERATURE: 96.8 F

## 2020-10-23 PROCEDURE — G8427 DOCREV CUR MEDS BY ELIG CLIN: HCPCS | Performed by: ORTHOPAEDIC SURGERY

## 2020-10-23 PROCEDURE — 4004F PT TOBACCO SCREEN RCVD TLK: CPT | Performed by: ORTHOPAEDIC SURGERY

## 2020-10-23 PROCEDURE — G8484 FLU IMMUNIZE NO ADMIN: HCPCS | Performed by: ORTHOPAEDIC SURGERY

## 2020-10-23 PROCEDURE — 99213 OFFICE O/P EST LOW 20 MIN: CPT | Performed by: ORTHOPAEDIC SURGERY

## 2020-10-23 PROCEDURE — G8420 CALC BMI NORM PARAMETERS: HCPCS | Performed by: ORTHOPAEDIC SURGERY

## 2020-10-23 RX ORDER — IBUPROFEN 800 MG/1
TABLET ORAL
COMMUNITY
Start: 2020-10-01 | End: 2021-09-09 | Stop reason: ALTCHOICE

## 2020-10-23 NOTE — PROGRESS NOTES
Follow Up Post Operative Visit     Surgery: Left knee arthroscopy, medial meniscus root repair, microfracture of medial femoral condyle 4x4 mm grade IV defect  Date: 7/23/2020    Subjective:    Vani Lombardo is here for follow up visit s/p above procedure. She is doing well. She has been noncompliant, has not done PT. She was running some the other day playing basketball and had some pain. Overall she is doing well but still complains of some stiffness    Controlled Substances Monitoring:        Physical Exam:    Height: 5' 3\" (1.6 m), Weight: 135 lb (61.2 kg)    General: Alert and oriented x3, no acute distress  Cardiovascular/pulmonary: No labored breathing, peripheral perfusion intact  Musculoskeletal:    Exam of the knee shows full range of motion. Negative Rodo's. No joint line tenderness. Ligamentous exam is stable      Imaging: No new imaging today. Previous images reviewed    Assessment and Plan: Status post left knee medial meniscus root repair and microfracture medial femoral condyle about 3 months out  She is doing well despite being noncompliant and not doing any physical therapy. She has been getting back to normal activities and is doing some exercises on her own. She can start to progress running. She will avoid any sport specific activities.   Follow-up in 3 months    Willa Carrasco MD  Orthopaedic Surgery   10/23/20  8:48 AM

## 2020-11-19 ENCOUNTER — HOSPITAL ENCOUNTER (EMERGENCY)
Age: 40
Discharge: HOME | End: 2020-11-19
Payer: COMMERCIAL

## 2020-11-19 VITALS
SYSTOLIC BLOOD PRESSURE: 122 MMHG | DIASTOLIC BLOOD PRESSURE: 84 MMHG | WEIGHT: 135 LBS | TEMPERATURE: 98.4 F | HEART RATE: 89 BPM | HEIGHT: 63 IN | BODY MASS INDEX: 23.92 KG/M2 | RESPIRATION RATE: 15 BRPM | OXYGEN SATURATION: 98 %

## 2020-11-19 PROCEDURE — 99282 EMERGENCY DEPT VISIT SF MDM: CPT

## 2020-11-19 NOTE — ED PROVIDER NOTES
[facility]     Department of Emergency Medicine   ED  Provider Note  Admit Date/RoomTime: 11/19/2020 10:13 AM  [place] [facility]  Chief Complaint:   Fever (pt having symptoms since last night-temp 99.9-daughter was tested for COVID and awaiting results) and Shortness of Breath    History of Present Illness   Source of history provided by:  patient. History/Exam Limitations: none. Jimmy Lewis is a 47 y.o. old female who presents to the emergency department by private vehicle, for concern for COVID-19 due to exposure with, chills, nasal congestion, rhinorrhea and sore throat, which began 1 day(s) prior to arrival.  Since onset the symptoms have been persistent and mild in severity. The symptoms are associated with loss of taste and loss of smell. There has been NO abdominal pain, dry cough, productive cough, nausea, vomiting, diarrhea, dysuria or fever. ROS   Pertinent positives and negatives are stated within HPI, all other systems reviewed and are negative. has a past medical history of ADHD, Depression, PONV (postoperative nausea and vomiting), and Tear of left meniscus as current injury. Past Surgical History:  has a past surgical history that includes Anterior cruciate ligament repair (Left, 1998); Dilation and curettage of uterus (2012); Carpal tunnel release (Right, 4/2015); Rotator cuff repair (6/10/15); Rotator cuff repair (4/2015); other surgical history (Right, 11/18/15); and Anterior cruciate ligament repair (Left, 7/23/2020). Social History:  reports that she has been smoking cigarettes. She has a 2.50 pack-year smoking history. She has never used smokeless tobacco. She reports that she does not drink alcohol or use drugs. Family History: family history is not on file. Allergies: Patient has no known allergies.     Physical Exam           ED Triage Vitals [11/19/20 1010]   BP Temp Temp Source Pulse Resp SpO2 Height Weight   122/84 98.4 °F (36.9 °C) Temporal 89 15 98 % 5' 3\" (1.6 m) 135 lb (61.2 kg)      Oxygen Saturation Interpretation: Normal.    · Constitutional:  Alert, development consistent with age. · Ears:  External Ears: Bilateral normal.               TM's & External Canals: normal TMs bilaterally. · Nose:   There is no discharge, swelling or lesions noted. · Sinuses: no Bilateral maxillary sinus tenderness. no Bilateral frontal sinus tenderness. · Mouth:  normal tongue and buccal mucosa. · Throat: mild erythema. Airway Patent. · Neck:  Supple. There is no  anterior cervical node tenderness. · Respiratory:   Breath sounds: Bilateral normal.  Lung sounds: normal.   · CV:  Regular rate and rhythm, normal heart sounds, without pathological murmurs, ectopy, gallops, or rubs. · GI:  Abdomen Soft, nontender, good bowel sounds. No firm or pulsatile mass. · Integument:  Normal turgor. Warm, dry, without visible rash. · Neurological:  Oriented. Motor functions intact. Lab / Imaging Results   (All laboratory and radiology results have been personally reviewed by myself)  Labs:  No results found for this visit on 11/19/20. Imaging: All Radiology results interpreted by Radiologist unless otherwise noted. No orders to display       ED Course / Medical Decision Making   Medications - No data to display       Medical Decision Making:    Based on no suspicion for pneumonia as per history/physical findings, imaging was not done.  Based on moderate suspicion for COVID-19 as per history/physical findings, testing was obtained and results are pending.  Upper respiratory infection is likely to  be viral in etiology. Antibiotics are not indicated at this time based on clinical presentation and physical findings. She is not hypoxic. Patient is well appearing, non toxic and appropriate for outpatient management.         Plan of Care/ Counseling:    I discussed today's results with the patient in addition to providing specific details for the plan of care and counseling regarding the diagnosis and prognosis as well as reasons to return to emergency department. Questions are answered at this time and they are agreeable with the plan. Assessment     1. Close exposure to 2019-[facility]      Plan   Discharge to home  Patient condition is good    New Medications     New Prescriptions    No medications on file     Electronically signed by KELSEY Quinteros   DD: 11/19/20  **This report was transcribed using voice recognition software. Every effort was made to ensure accuracy; however, inadvertent computerized transcription errors may be present.   END OF ED PROVIDER NOTE         KELSEY Sheppard  11/19/20 9870

## 2021-02-03 ENCOUNTER — OFFICE VISIT (OUTPATIENT)
Dept: ORTHOPEDIC SURGERY | Age: 41
End: 2021-02-03
Payer: COMMERCIAL

## 2021-02-03 VITALS — HEIGHT: 63 IN | WEIGHT: 140 LBS | BODY MASS INDEX: 24.8 KG/M2 | TEMPERATURE: 97.2 F

## 2021-02-03 DIAGNOSIS — S83.242A ACUTE MEDIAL MENISCUS TEAR OF LEFT KNEE, INITIAL ENCOUNTER: ICD-10-CM

## 2021-02-03 DIAGNOSIS — Z98.890 S/P ARTHROSCOPIC SURGERY OF LEFT KNEE: Primary | ICD-10-CM

## 2021-02-03 PROCEDURE — 99213 OFFICE O/P EST LOW 20 MIN: CPT | Performed by: ORTHOPAEDIC SURGERY

## 2021-02-03 PROCEDURE — G8420 CALC BMI NORM PARAMETERS: HCPCS | Performed by: ORTHOPAEDIC SURGERY

## 2021-02-03 PROCEDURE — G8427 DOCREV CUR MEDS BY ELIG CLIN: HCPCS | Performed by: ORTHOPAEDIC SURGERY

## 2021-02-03 PROCEDURE — 4004F PT TOBACCO SCREEN RCVD TLK: CPT | Performed by: ORTHOPAEDIC SURGERY

## 2021-02-03 PROCEDURE — G8484 FLU IMMUNIZE NO ADMIN: HCPCS | Performed by: ORTHOPAEDIC SURGERY

## 2021-02-03 NOTE — PROGRESS NOTES
Follow Up Post Operative Visit     Surgery: Left knee arthroscopy, medial meniscus root repair, microfracture of medial femoral condyle 4x4 mm grade IV defect  Date: 7/23/2020    Subjective:    Mick Esteban is here for follow up visit s/p above procedure. She is still having some occasional swelling and some subjective instability. She intermittently did physical therapy, did not go to her last several sessions    Controlled Substances Monitoring:        Physical Exam:    Height: 5' 3\" (1.6 m), Weight: 140 lb (63.5 kg)    General: Alert and oriented x3, no acute distress  Cardiovascular/pulmonary: No labored breathing, peripheral perfusion intact  Musculoskeletal:    Exam of the knee is benign today. Range of motion is full. There is no swelling or joint line tenderness. Lachman and posterior drawer stable. Pivot shift is negative      Imaging: No new images. Previous images reviewed    Assessment and Plan: She is 6 months out from left knee medial meniscus root repair, microfracture of medial femoral condyle articular cartilage defect    Overall she is doing okay. She was somewhat noncompliant in the course of therapy. I would like to continue to observe for now. We will see her back in 3 months. We discussed protracted recovery due to the mild articular cartilage abnormalities we noted in her knee which could explain the swelling. Her knee is stable today.   If her symptoms are worsening we will consider repeat MRI at next visit    Samanta Adams MD  Orthopaedic Surgery   2/3/21  11:16 AM

## 2021-04-06 ENCOUNTER — TELEPHONE (OUTPATIENT)
Dept: ORTHOPEDIC SURGERY | Age: 41
End: 2021-04-06

## 2021-04-13 ENCOUNTER — OFFICE VISIT (OUTPATIENT)
Dept: ORTHOPEDIC SURGERY | Age: 41
End: 2021-04-13
Payer: COMMERCIAL

## 2021-04-13 VITALS — BODY MASS INDEX: 24.8 KG/M2 | WEIGHT: 140 LBS | HEIGHT: 63 IN

## 2021-04-13 DIAGNOSIS — Z98.890 S/P ARTHROSCOPIC SURGERY OF LEFT KNEE: Primary | ICD-10-CM

## 2021-04-13 DIAGNOSIS — S83.242A ACUTE MEDIAL MENISCUS TEAR OF LEFT KNEE, INITIAL ENCOUNTER: ICD-10-CM

## 2021-04-13 PROCEDURE — 99212 OFFICE O/P EST SF 10 MIN: CPT | Performed by: NURSE PRACTITIONER

## 2021-04-13 PROCEDURE — 4004F PT TOBACCO SCREEN RCVD TLK: CPT | Performed by: NURSE PRACTITIONER

## 2021-04-13 PROCEDURE — G8420 CALC BMI NORM PARAMETERS: HCPCS | Performed by: NURSE PRACTITIONER

## 2021-04-13 PROCEDURE — G8428 CUR MEDS NOT DOCUMENT: HCPCS | Performed by: NURSE PRACTITIONER

## 2021-04-13 NOTE — PROGRESS NOTES
however they have been gradually worsening over the last 2 months. Patient complains of intermittent locking, swelling, medial tenderness. We discussed at her previous visit potentially obtaining a new MRI of her knee to further evaluate for possible retearing of the meniscus. Patient would like to proceed with MRI of her left knee today. MRI order was placed and will call patient with MRI results after it is obtained. Patient verbalized understanding.     Jorge Clemons MD  Orthopaedic Surgery   4/13/21  1:53 PM

## 2021-05-05 ENCOUNTER — HOSPITAL ENCOUNTER (OUTPATIENT)
Dept: MRI IMAGING | Age: 41
Discharge: HOME | End: 2021-05-07
Payer: COMMERCIAL

## 2021-05-05 DIAGNOSIS — Z98.890 S/P ARTHROSCOPIC SURGERY OF LEFT KNEE: ICD-10-CM

## 2021-05-05 PROCEDURE — 73721 MRI JNT OF LWR EXTRE W/O DYE: CPT

## 2021-05-26 ENCOUNTER — OFFICE VISIT (OUTPATIENT)
Dept: ORTHOPEDIC SURGERY | Age: 41
End: 2021-05-26
Payer: COMMERCIAL

## 2021-05-26 VITALS — WEIGHT: 135 LBS | BODY MASS INDEX: 23.92 KG/M2 | HEIGHT: 63 IN

## 2021-05-26 DIAGNOSIS — Z98.890 S/P ARTHROSCOPIC SURGERY OF LEFT KNEE: ICD-10-CM

## 2021-05-26 DIAGNOSIS — M25.562 ACUTE PAIN OF LEFT KNEE: Primary | ICD-10-CM

## 2021-05-26 DIAGNOSIS — Z98.890 HISTORY OF ANTERIOR CRUCIATE LIGAMENT SURGERY: ICD-10-CM

## 2021-05-26 PROCEDURE — 99214 OFFICE O/P EST MOD 30 MIN: CPT | Performed by: ORTHOPAEDIC SURGERY

## 2021-05-26 PROCEDURE — 4004F PT TOBACCO SCREEN RCVD TLK: CPT | Performed by: ORTHOPAEDIC SURGERY

## 2021-05-26 PROCEDURE — G8427 DOCREV CUR MEDS BY ELIG CLIN: HCPCS | Performed by: ORTHOPAEDIC SURGERY

## 2021-05-26 PROCEDURE — 20610 DRAIN/INJ JOINT/BURSA W/O US: CPT | Performed by: ORTHOPAEDIC SURGERY

## 2021-05-26 PROCEDURE — G8420 CALC BMI NORM PARAMETERS: HCPCS | Performed by: ORTHOPAEDIC SURGERY

## 2021-05-26 RX ORDER — LIDOCAINE HYDROCHLORIDE 10 MG/ML
4 INJECTION, SOLUTION INFILTRATION; PERINEURAL ONCE
Status: COMPLETED | OUTPATIENT
Start: 2021-05-26 | End: 2021-05-26

## 2021-05-26 RX ORDER — TRIAMCINOLONE ACETONIDE 40 MG/ML
40 INJECTION, SUSPENSION INTRA-ARTICULAR; INTRAMUSCULAR ONCE
Status: COMPLETED | OUTPATIENT
Start: 2021-05-26 | End: 2021-05-26

## 2021-05-26 RX ADMIN — TRIAMCINOLONE ACETONIDE 40 MG: 40 INJECTION, SUSPENSION INTRA-ARTICULAR; INTRAMUSCULAR at 10:08

## 2021-05-26 RX ADMIN — LIDOCAINE HYDROCHLORIDE 4 ML: 10 INJECTION, SOLUTION INFILTRATION; PERINEURAL at 10:07

## 2021-05-26 NOTE — PROGRESS NOTES
Assisted Dr. Anastacia Elias with injection of 4 cc lidocaine/1 cc kenalog in L knee. Patient tolerated procedure well. Verbal instructions were given.

## 2021-05-26 NOTE — PROGRESS NOTES
Follow Up Visit     Jefry Fiore returns today for follow up visit regarding left knee pain, h/o Left knee arthroscopy, medial meniscus root repair, microfracture of medial femoral condyle 4x4 mm grade IV defect on 7/23/2020. Initially she was doing well but then developed insidious onset of recurrent pain, mostly posterior. She was noncompliant after surgery and did minimal to no physical therapy. New MRI was obtained, she is here to review results and discuss options. She reports symptoms are unchanged. Physical Exam:     Height: 5' 3\" (1.6 m), Weight: 135 lb (61.2 kg)    General: Alert and oriented x3, no acute distress  Cardiovascular/pulmonary: No labored breathing, peripheral perfusion intact  Musculoskeletal:    Exam of the knee shows range of motion about 5 to 130 degrees. There is no significant medial joint line tenderness today. Lachman does have endpoint. Posterior drawer stable. Knee is stable to varus and valgus at 0 and 30    Controlled Substances Monitoring:      Imaging:  MRI was reviewed today. This shows similar appearance to her ACL from previous MRI. There is evidence of her previous meniscal root repair which by my view looks to be intact. The area that was microfracture to medial femoral condyle shows likely some filling in of cartilage with minimal bony edema. Procedure Note: Knee Cortisone injection     The left knee was identified as the injection site. The risk and benefits of a cortisone injection were explained and the patient consented to the injection. Under sterile conditions, the knee was injected with a mixture of 40 mg of Kenalog and 4 mL of 1% Lidocaine without complication. A sterile bandage was applied.     Administrations This Visit     lidocaine 1 % injection 4 mL     Admin Date  05/26/2021 Action  Given Dose  4 mL Route  Intra-articular Administered By  Luisito Mancilla RN          triamcinolone acetonide (KENALOG-40) injection 40 mg     Admin Date  05/26/2021 Action  Given Dose  40 mg Route  Intra-articular Administered By  Cullen Krishna RN                      Assessment: Chronic left knee pain after medial meniscus root repair, microfracture medial femoral condyle, remote history of ACL reconstruction      Plan:   We discussed her knee today. She is still under 1 year out from her surgery. She is a difficult case. Graft position is not ideal, but does seem to be providing her some stability at least on Lockman test and as well as on intraoperative pivot shift exam.  I think her meniscus root repair is intact. She has the issue of some generalized wear and tear as well as medial femoral condyle defect that was microfractured. I would continue with observation. We discussed a steroid shot to calm down any inflammation. This will be a one-time injection. If she does not improve we discussed viscosupplementation which may be a good option. Last resort would be another surgery which would likely be staged where we would go in, diagnostic scope the knee, remove tibial hardware with potential bone grafting, and then eventually come back and revise her ACL with possible medial femoral condyle osteochondral allograft.   We will see her back in 3 months to reassess    Odilia Miller MD  Orthopaedic Surgery   5/26/21  8:51 AM

## 2021-09-09 ENCOUNTER — HOSPITAL ENCOUNTER (EMERGENCY)
Age: 41
Discharge: LEFT BEFORE DISCHARGE | End: 2021-09-09
Payer: COMMERCIAL

## 2021-09-09 ENCOUNTER — APPOINTMENT (OUTPATIENT)
Dept: GENERAL RADIOLOGY | Age: 41
End: 2021-09-09
Payer: COMMERCIAL

## 2021-09-09 VITALS
BODY MASS INDEX: 23.92 KG/M2 | SYSTOLIC BLOOD PRESSURE: 116 MMHG | HEART RATE: 92 BPM | TEMPERATURE: 98.2 F | RESPIRATION RATE: 16 BRPM | HEIGHT: 63 IN | DIASTOLIC BLOOD PRESSURE: 78 MMHG | WEIGHT: 135 LBS | OXYGEN SATURATION: 99 %

## 2021-09-09 DIAGNOSIS — S43.402A SPRAIN OF LEFT SHOULDER, UNSPECIFIED SHOULDER SPRAIN TYPE, INITIAL ENCOUNTER: Primary | ICD-10-CM

## 2021-09-09 PROCEDURE — 99284 EMERGENCY DEPT VISIT MOD MDM: CPT

## 2021-09-09 PROCEDURE — 96372 THER/PROPH/DIAG INJ SC/IM: CPT

## 2021-09-09 PROCEDURE — 73030 X-RAY EXAM OF SHOULDER: CPT

## 2021-09-09 PROCEDURE — 6360000002 HC RX W HCPCS: Performed by: NURSE PRACTITIONER

## 2021-09-09 RX ORDER — KETOROLAC TROMETHAMINE 30 MG/ML
30 INJECTION, SOLUTION INTRAMUSCULAR; INTRAVENOUS ONCE
Status: COMPLETED | OUTPATIENT
Start: 2021-09-09 | End: 2021-09-09

## 2021-09-09 RX ORDER — NAPROXEN 500 MG/1
500 TABLET ORAL 2 TIMES DAILY PRN
Qty: 14 TABLET | Refills: 0 | Status: SHIPPED | OUTPATIENT
Start: 2021-09-09 | End: 2022-05-04 | Stop reason: ALTCHOICE

## 2021-09-09 RX ORDER — CYCLOBENZAPRINE HCL 10 MG
10 TABLET ORAL 3 TIMES DAILY PRN
Qty: 10 TABLET | Refills: 0 | Status: SHIPPED | OUTPATIENT
Start: 2021-09-09 | End: 2022-08-17 | Stop reason: ALTCHOICE

## 2021-09-09 RX ADMIN — KETOROLAC TROMETHAMINE 30 MG: 30 INJECTION, SOLUTION INTRAMUSCULAR at 12:39

## 2021-09-09 ASSESSMENT — PAIN DESCRIPTION - PAIN TYPE: TYPE: ACUTE PAIN

## 2021-09-09 ASSESSMENT — PAIN DESCRIPTION - ORIENTATION: ORIENTATION: LEFT

## 2021-09-09 ASSESSMENT — PAIN DESCRIPTION - LOCATION: LOCATION: SHOULDER

## 2021-09-09 ASSESSMENT — PAIN DESCRIPTION - ONSET: ONSET: ON-GOING

## 2021-09-09 ASSESSMENT — PAIN DESCRIPTION - DESCRIPTORS: DESCRIPTORS: BURNING;STABBING;CONSTANT

## 2021-09-09 ASSESSMENT — PAIN SCALES - GENERAL
PAINLEVEL_OUTOF10: 10
PAINLEVEL_OUTOF10: 10

## 2021-09-09 NOTE — ED PROVIDER NOTES
[address] [place] [address]  Department of Emergency Medicine   ED  Encounter Note  Admit Date/RoomTime: 2021 10:56 AM  ED Room: 34/34    NAME: Mily Cyr  : 1980  MRN: [id number]     Chief Complaint:  Shoulder Pain (left while Wake boarding approx 2 weeks ago worsening) and Numbness (fingers on left hand)    History of Present Illness       Adrian Vegas is a 46 y.o. old female who presents to the emergency department by private vehicle, for traumatic Left shoulder pain which occured 2021 prior to arrival. The complaint is due to holding onto a strap while wakeboarding and states that she let go and the rope jerked her left arm. She is right-hand dominant. Patient reports that she had similar symptoms with the right shoulder and had Dr. Mireles Diss repair the rotator cuff on the right in the past.  Patient did take ibuprofen last evening and is unable to fully raise her arm above her head or reach behind her back. She can touch her opposite shoulder. Patient reports she did call Dr. Palmer [place] office and has an appointment on 2021. Patient has no prior history of pain/injury with regards to today's visit. Since onset the symptoms have been persistent and gradually worsening. Her pain is aggraveated by any movement, any use of, certain movements or pressure on or palpation of painful area and relieved by nothing, as no treatment has been provided prior to this visit. She denies any head injury, headache, loss of consciousness, confusion, dizziness, neck pain, chest pain, abdominal pain, back pain, extremity injury, numbness, weakness, blurred vision, nausea or vomiting. ROS   Pertinent positives and negatives are stated within HPI, all other systems reviewed and are negative. Past Medical History:  has a past medical history of ADHD, Depression, PONV (postoperative nausea and vomiting), and Tear of left meniscus as current injury.     Surgical History:  has a past surgical history that includes Anterior cruciate ligament repair (Left, 1998); Dilation and curettage of uterus (2012); Carpal tunnel release (Right, 4/2015); Rotator cuff repair (6/10/15); Rotator cuff repair (4/2015); other surgical history (Right, 11/18/15); and Anterior cruciate ligament repair (Left, 7/23/2020). Social History:  reports that she has been smoking cigarettes. She has a 2.50 pack-year smoking history. She has never used smokeless tobacco. She reports that she does not drink alcohol and does not use drugs. Family History: family history is not on file. Allergies: Patient has no known allergies. Physical Exam   Oxygen Saturation Interpretation: Normal.        ED Triage Vitals [09/09/21 0831]   BP Temp Temp Source Pulse Resp SpO2 Height Weight   (!) 131/90 98.4 °F (36.9 °C) Oral 95 16 98 % 5' 3\" (1.6 m) 135 lb (61.2 kg)         Constitutional:  Alert, development consistent with age. Neck:  Normal ROM. Supple. No midline bony tenderness to firm palpation. Left Shoulder: AC joint. Tenderness: moderate the left AC joint. No tenderness to the wrist or forearm. No snuffbox tenderness. Hand grasp strong. Swelling: None. Deformity: no deformity observed/palpated. ROM: diminished range with pain. Skin:  no wounds, erythema, or swelling. Neurovascular: Motor deficit: Patient able to touch opposite shoulder unable to reach behind her back or lift her arm above her level of her shoulder. .             Sensory deficit: none. Pulse deficit: none. 2+ radial pulses symmetrically strong. Capillary refill: normal.  Less than 3 seconds in distal fingers. Left Elbow:             Tenderness:  none. Swelling: None. Deformity: no deformity observed/palpated. ROM: Full painless range of motion.             Skin:  no wounds, erythema, or swelling. Lymphatics: No lymphangitis or edema noted  Neurological:  Oriented. Motor functions intact. Lab / Imaging Results   (All laboratory and radiology results have been personally reviewed by myself)  Labs:  No results found for this visit on 09/09/21. Imaging: All Radiology results interpreted by Radiologist unless otherwise noted. XR SHOULDER LEFT (MIN 2 VIEWS)   Final Result   Unremarkable left shoulder. ED Course / Medical Decision Making     Medications   ketorolac (TORADOL) injection 30 mg (30 mg IntraMUSCular Given 9/9/21 1239)        Re-examination:  9/9/21       Time: 1311 Discussed results of x-ray which were normal and patient had improvement of symptoms and she was noted to be sitting with her left arm above her head. Consult(s):   None    Procedure(s):   none    MDM:      Imaging was obtained based on moderate suspicion for fracture / bony abnormality as per history/physical findings. Patient denies any chest pain or shortness of breath. X-ray was negative for any acute findings. Patient was medicated with Toradol and was able to raise her arm above her head after being medicated and symptoms improved but not completely resolved. Patient was given a short course of muscle relaxants and NSAIDs. Plan is subsequently for symptom control, limited use and  immobilization with appropriate outpatient follow-up. She was advised on signs and symptoms warranting immediate return to the ED for reevaluation at any time. Plan of Care/Counseling:  RODOLFO King CNP reviewed today's visit with the patient in addition to providing specific details for the plan of care and counseling regarding the diagnosis and prognosis. Questions are answered at this time and are agreeable with the plan. Assessment      1. Sprain of left shoulder, unspecified shoulder sprain type, initial encounter      Plan   Discharged home.   Patient condition is good    New Medications Discharge Medication List as of 9/9/2021  1:14 PM      START taking these medications    Details   cyclobenzaprine (FLEXERIL) 10 MG tablet Take 1 tablet by mouth 3 times daily as needed for Muscle spasms, Disp-10 tablet, R-0Print      naproxen (NAPROSYN) 500 MG tablet Take 1 tablet by mouth 2 times daily as needed for Pain (take with food and full glass of water.), Disp-14 tablet, R-0Print           Electronically signed by RODOLFO Cope CNP   DD: 9/9/21  **This report was transcribed using voice recognition software. Every effort was made to ensure accuracy; however, inadvertent computerized transcription errors may be present.   END OF ED PROVIDER NOTE        RODOLFO Cope CNP  09/09/21 1189

## 2021-10-05 ENCOUNTER — TELEPHONE (OUTPATIENT)
Dept: ORTHOPEDIC SURGERY | Age: 41
End: 2021-10-05

## 2021-10-05 ENCOUNTER — OFFICE VISIT (OUTPATIENT)
Dept: ORTHOPEDIC SURGERY | Age: 41
End: 2021-10-05
Payer: COMMERCIAL

## 2021-10-05 VITALS — HEIGHT: 63 IN | BODY MASS INDEX: 24.8 KG/M2 | RESPIRATION RATE: 18 BRPM | WEIGHT: 140 LBS

## 2021-10-05 DIAGNOSIS — M25.512 ACUTE PAIN OF LEFT SHOULDER: Primary | ICD-10-CM

## 2021-10-05 DIAGNOSIS — M25.512 LEFT SHOULDER PAIN, UNSPECIFIED CHRONICITY: Primary | ICD-10-CM

## 2021-10-05 PROCEDURE — 4004F PT TOBACCO SCREEN RCVD TLK: CPT | Performed by: ORTHOPAEDIC SURGERY

## 2021-10-05 PROCEDURE — G8484 FLU IMMUNIZE NO ADMIN: HCPCS | Performed by: ORTHOPAEDIC SURGERY

## 2021-10-05 PROCEDURE — G8420 CALC BMI NORM PARAMETERS: HCPCS | Performed by: ORTHOPAEDIC SURGERY

## 2021-10-05 PROCEDURE — G8427 DOCREV CUR MEDS BY ELIG CLIN: HCPCS | Performed by: ORTHOPAEDIC SURGERY

## 2021-10-05 PROCEDURE — 99213 OFFICE O/P EST LOW 20 MIN: CPT | Performed by: ORTHOPAEDIC SURGERY

## 2021-10-05 NOTE — PROGRESS NOTES
Department of Orthopedic Surgery  History and Physical      CHIEF COMPLAINT:  Left shoulder pain    HISTORY OF PRESENT ILLNESS:                The patient is a RHD 46 y.o. female who presents with left shoulder pain. Patient states the end of August she was wakeboarding. She states the boat started to go and she was jerked forward. She states she felt a snap to her left shoulder. Ever since the incident she has had progressive worsening pain, strength and decreased range of motion to the left shoulder. She has been taking ibuprofen 800 mg twice a day for the last 6 weeks with no relief of her pain. 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 Past Medical History:        Diagnosis Date    ADHD     Depression     controlled with med, per patient    PONV (postoperative nausea and vomiting)     Tear of left meniscus as current injury      Past Surgical History:        Procedure Laterality Date    ANTERIOR CRUCIATE LIGAMENT REPAIR Left 1998    ANTERIOR CRUCIATE LIGAMENT REPAIR Left 7/23/2020    LEFT KNEE ARTHROSCOPY MEDIAL MENISCUS ROOT REPAIR POSSIBLE REVISION ACL RECONSTRUCTION WITH ALLOGRAFT performed by Adelia Davis MD at [facility] Right 4/2015    DILATION AND CURETTAGE OF UTERUS  2012    OTHER SURGICAL HISTORY Right 11/18/15    RIGHT SHOULDER MANIPULATION & ARTHROSCOPY    ROTATOR CUFF REPAIR  6/10/15    right rotator cuff open revision    ROTATOR CUFF REPAIR  4/2015     Current Medications:   No current facility-administered medications for this visit. Allergies:  Patient has no known allergies. Social History:   TOBACCO:   reports that she has been smoking cigarettes. She has a 2.50 pack-year smoking history. She has never used smokeless tobacco.  ETOH:   reports no history of alcohol use. DRUGS:   reports no history of drug use.   ACTIVITIES OF DAILY LIVING: HCT 35.7 05/08/2019    MCV 92.0 05/08/2019    MCH 29.1 05/08/2019    MCHC 31.7 05/08/2019    RDW 12.9 05/08/2019     05/08/2019    MPV 11.3 05/08/2019     PT/INR:    Lab Results   Component Value Date    PROTIME 11.7 01/27/2015    INR 1.1 01/27/2015       Radiology Review: X-rays of the left shoulder were dated September 9 of 2021. Three views: AP, scapular Y view and Dieter Thong demonstrates no acute fracture dislocations. Impression: No acute fracture dislocations    IMPRESSION:  · Left shoulder pain with weakness of rotator cuff following acute injury  · Depression, ADHD    PLAN:  Discussed findings with patient. Recommend she has had an acute injury with continued and progressive weakness and decreased range of motion of the left shoulder over the last 6 weeks. She has failed anti-inflammatories. Recommend an MRI of the left shoulder for presurgical planning purposes. Patient will follow-up once this has been completed. All questions answered. I have seen and evaluated the patient and agree with the above assessment and plan on today's visit. I have performed the key components of the history and physical examination with significant findings of left shoulder weakness in 6 weeks after injury while wakeboarding. Patient does have significant weakness. She reports no significant improvement over the past 6 weeks. She is doing self-guided home exercise program and has been using Motrin 800 mg twice a day without improvement. Recommended MRI of the shoulder to help guide further treatment. . I concur with the findings and plan as documented.     Maria Marquez MD  10/5/2021

## 2021-10-06 ENCOUNTER — TELEPHONE (OUTPATIENT)
Dept: ORTHOPEDIC SURGERY | Age: 41
End: 2021-10-06

## 2021-10-06 NOTE — TELEPHONE ENCOUNTER
Left message with Dr. Orly Childers office regarding if MRI was ordered and if authorization was started. Waiting for call back.

## 2021-10-20 DIAGNOSIS — M25.512 ACUTE PAIN OF LEFT SHOULDER: ICD-10-CM

## 2021-10-20 DIAGNOSIS — M25.512 LEFT SHOULDER PAIN, UNSPECIFIED CHRONICITY: Primary | ICD-10-CM

## 2021-11-18 ENCOUNTER — HOSPITAL ENCOUNTER (OUTPATIENT)
Dept: MRI IMAGING | Age: 41
Discharge: HOME | End: 2021-11-20

## 2021-11-18 DIAGNOSIS — M25.512 ACUTE PAIN OF LEFT SHOULDER: ICD-10-CM

## 2021-11-30 DIAGNOSIS — M25.512 ACUTE PAIN OF LEFT SHOULDER: ICD-10-CM

## 2021-11-30 DIAGNOSIS — M25.512 LEFT SHOULDER PAIN, UNSPECIFIED CHRONICITY: Primary | ICD-10-CM

## 2022-01-28 ENCOUNTER — TELEPHONE (OUTPATIENT)
Dept: ORTHOPEDIC SURGERY | Age: 42
End: 2022-01-28

## 2022-01-28 NOTE — TELEPHONE ENCOUNTER
Patient called office stating she was unable to get CT done due to [place]. Current authorization good through 1/29/22. Called [facility] to extend Auth period. Date extension 1/28/22 through 3/29/22. [facility] #[id number]. Patient made aware and connected to radiology scheduling.

## 2022-02-06 ENCOUNTER — APPOINTMENT (OUTPATIENT)
Dept: CT IMAGING | Age: 42
End: 2022-02-06
Payer: COMMERCIAL

## 2022-02-06 ENCOUNTER — HOSPITAL ENCOUNTER (EMERGENCY)
Age: 42
Discharge: ANOTHER HOSPITAL | End: 2022-02-06
Attending: STUDENT IN AN ORGANIZED HEALTH CARE EDUCATION/TRAINING PROGRAM
Payer: COMMERCIAL

## 2022-02-06 ENCOUNTER — HOSPITAL ENCOUNTER (OUTPATIENT)
Age: 42
Setting detail: OBSERVATION
Discharge: HOME | End: 2022-02-08
Attending: EMERGENCY MEDICINE | Admitting: SURGERY
Payer: COMMERCIAL

## 2022-02-06 VITALS
BODY MASS INDEX: 24.8 KG/M2 | WEIGHT: 140 LBS | TEMPERATURE: 98.4 F | HEIGHT: 63 IN | OXYGEN SATURATION: 97 % | DIASTOLIC BLOOD PRESSURE: 94 MMHG | HEART RATE: 84 BPM | SYSTOLIC BLOOD PRESSURE: 118 MMHG | RESPIRATION RATE: 18 BRPM

## 2022-02-06 DIAGNOSIS — S09.90XA INJURY OF HEAD, INITIAL ENCOUNTER: ICD-10-CM

## 2022-02-06 DIAGNOSIS — M54.2 NECK PAIN: ICD-10-CM

## 2022-02-06 DIAGNOSIS — R55 SYNCOPE AND COLLAPSE: Primary | ICD-10-CM
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
## 2022-02-06 DIAGNOSIS — S06.0X9A CLOSED HEAD INJURY WITH CONCUSSION, WITH LOSS OF CONSCIOUSNESS, INITIAL ENCOUNTER: Primary | ICD-10-CM

## 2022-02-06 PROBLEM — T14.90XA TRAUMA: Status: ACTIVE | Noted: 2022-02-06

## 2022-02-06 LAB
ACETAMINOPHEN LEVEL: <5 MCG/ML (ref 10–30)
ALBUMIN SERPL-MCNC: 4.9 G/DL (ref 3.5–5.2)
ALP BLD-CCNC: 80 U/L (ref 35–104)
ALT SERPL-CCNC: 8 U/L (ref 0–32)
AMPHETAMINE SCREEN, URINE: POSITIVE
ANION GAP SERPL CALCULATED.3IONS-SCNC: 15 MMOL/L (ref 7–16)
APTT: 24 SEC (ref 24.5–35.1)
AST SERPL-CCNC: 13 U/L (ref 0–31)
BACTERIA: NORMAL /HPF
BARBITURATE SCREEN URINE: NOT DETECTED
BASOPHILS ABSOLUTE: 0.03 E9/L (ref 0–0.2)
BASOPHILS RELATIVE PERCENT: 0.3 % (ref 0–2)
BENZODIAZEPINE SCREEN, URINE: NOT DETECTED
BILIRUB SERPL-MCNC: 0.6 MG/DL (ref 0–1.2)
BILIRUBIN URINE: NEGATIVE
BLOOD, URINE: NEGATIVE
BUN BLDV-MCNC: 15 MG/DL (ref 6–20)
CALCIUM SERPL-MCNC: 9.2 MG/DL (ref 8.6–10.2)
CANNABINOID SCREEN URINE: NOT DETECTED
CHLORIDE BLD-SCNC: 100 MMOL/L (ref 98–107)
CLARITY: CLEAR
CO2: 21 MMOL/L (ref 22–29)
COCAINE METABOLITE SCREEN URINE: NOT DETECTED
COLOR: YELLOW
CREAT SERPL-MCNC: 0.7 MG/DL (ref 0.5–1)
EKG ATRIAL RATE: 73 BPM
EKG P AXIS: 63 DEGREES
EKG P-R INTERVAL: 166 MS
EKG Q-T INTERVAL: 420 MS
EKG QRS DURATION: 90 MS
EKG QTC CALCULATION (BAZETT): 462 MS
EKG R AXIS: 70 DEGREES
EKG T AXIS: 59 DEGREES
EKG VENTRICULAR RATE: 73 BPM
EOSINOPHILS ABSOLUTE: 0.06 E9/L (ref 0.05–0.5)
EOSINOPHILS RELATIVE PERCENT: 0.7 % (ref 0–6)
ETHANOL: <10 MG/DL (ref 0–0.08)
FENTANYL SCREEN, URINE: NOT DETECTED
GFR AFRICAN AMERICAN: >60
GFR NON-AFRICAN AMERICAN: >60 ML/MIN/1.73
GLUCOSE BLD-MCNC: 117 MG/DL (ref 74–99)
GLUCOSE URINE: NEGATIVE MG/DL
HCT VFR BLD CALC: 39.9 % (ref 34–48)
HEMOGLOBIN: 12.9 G/DL (ref 11.5–15.5)
IMMATURE GRANULOCYTES #: 0.02 E9/L
IMMATURE GRANULOCYTES %: 0.2 % (ref 0–5)
INR BLD: 1.1
KETONES, URINE: ABNORMAL MG/DL
LACTIC ACID: 1.4 MMOL/L (ref 0.5–2.2)
LEUKOCYTE ESTERASE, URINE: NEGATIVE
LYMPHOCYTES ABSOLUTE: 2.55 E9/L (ref 1.5–4)
LYMPHOCYTES RELATIVE PERCENT: 29.1 % (ref 20–42)
Lab: ABNORMAL
MCH RBC QN AUTO: 28.9 PG (ref 26–35)
MCHC RBC AUTO-ENTMCNC: 32.3 % (ref 32–34.5)
MCV RBC AUTO: 89.5 FL (ref 80–99.9)
METHADONE SCREEN, URINE: NOT DETECTED
MONOCYTES ABSOLUTE: 0.7 E9/L (ref 0.1–0.95)
MONOCYTES RELATIVE PERCENT: 8 % (ref 2–12)
NEUTROPHILS ABSOLUTE: 5.41 E9/L (ref 1.8–7.3)
NEUTROPHILS RELATIVE PERCENT: 61.7 % (ref 43–80)
NITRITE, URINE: NEGATIVE
OPIATE SCREEN URINE: NOT DETECTED
OXYCODONE URINE: NOT DETECTED
PDW BLD-RTO: 13.5 FL (ref 11.5–15)
PH UA: 7 (ref 5–9)
PHENCYCLIDINE SCREEN URINE: NOT DETECTED
PLATELET # BLD: 330 E9/L (ref 130–450)
PMV BLD AUTO: 10.3 FL (ref 7–12)
POTASSIUM REFLEX MAGNESIUM: 3.9 MMOL/L (ref 3.5–5)
PRO-BNP: 22 PG/ML (ref 0–125)
PROTEIN UA: ABNORMAL MG/DL
PROTHROMBIN TIME: 12.4 SEC (ref 9.3–12.4)
RBC # BLD: 4.46 E12/L (ref 3.5–5.5)
RBC UA: NORMAL /HPF (ref 0–2)
SALICYLATE, SERUM: <0.3 MG/DL (ref 0–30)
SODIUM BLD-SCNC: 136 MMOL/L (ref 132–146)
SPECIFIC GRAVITY UA: 1.01 (ref 1–1.03)
TOTAL PROTEIN: 7.5 G/DL (ref 6.4–8.3)
TRICYCLIC ANTIDEPRESSANTS SCREEN SERUM: NEGATIVE NG/ML
TROPONIN, HIGH SENSITIVITY: <6 NG/L (ref 0–9)
UROBILINOGEN, URINE: 1 E.U./DL
WBC # BLD: 8.8 E9/L (ref 4.5–11.5)
WBC UA: NORMAL /HPF (ref 0–5)

## 2022-02-06 PROCEDURE — 83605 ASSAY OF LACTIC ACID: CPT

## 2022-02-06 PROCEDURE — 6370000000 HC RX 637 (ALT 250 FOR IP): Performed by: STUDENT IN AN ORGANIZED HEALTH CARE EDUCATION/TRAINING PROGRAM

## 2022-02-06 PROCEDURE — 96375 TX/PRO/DX INJ NEW DRUG ADDON: CPT

## 2022-02-06 PROCEDURE — 84484 ASSAY OF TROPONIN QUANT: CPT

## 2022-02-06 PROCEDURE — 70450 CT HEAD/BRAIN W/O DYE: CPT

## 2022-02-06 PROCEDURE — 6360000004 HC RX CONTRAST MEDICATION: Performed by: RADIOLOGY

## 2022-02-06 PROCEDURE — 83880 ASSAY OF NATRIURETIC PEPTIDE: CPT

## 2022-02-06 PROCEDURE — 93005 ELECTROCARDIOGRAM TRACING: CPT | Performed by: NURSE PRACTITIONER

## 2022-02-06 PROCEDURE — 99284 EMERGENCY DEPT VISIT MOD MDM: CPT

## 2022-02-06 PROCEDURE — 96374 THER/PROPH/DIAG INJ IV PUSH: CPT

## 2022-02-06 PROCEDURE — 85730 THROMBOPLASTIN TIME PARTIAL: CPT

## 2022-02-06 PROCEDURE — 81001 URINALYSIS AUTO W/SCOPE: CPT

## 2022-02-06 PROCEDURE — 82077 ASSAY SPEC XCP UR&BREATH IA: CPT

## 2022-02-06 PROCEDURE — 6360000002 HC RX W HCPCS: Performed by: STUDENT IN AN ORGANIZED HEALTH CARE EDUCATION/TRAINING PROGRAM

## 2022-02-06 PROCEDURE — G0378 HOSPITAL OBSERVATION PER HR: HCPCS

## 2022-02-06 PROCEDURE — 6360000002 HC RX W HCPCS

## 2022-02-06 PROCEDURE — 72131 CT LUMBAR SPINE W/O DYE: CPT

## 2022-02-06 PROCEDURE — 80179 DRUG ASSAY SALICYLATE: CPT

## 2022-02-06 PROCEDURE — 93010 ELECTROCARDIOGRAM REPORT: CPT | Performed by: INTERNAL MEDICINE

## 2022-02-06 PROCEDURE — 71275 CT ANGIOGRAPHY CHEST: CPT

## 2022-02-06 PROCEDURE — 80307 DRUG TEST PRSMV CHEM ANLYZR: CPT

## 2022-02-06 PROCEDURE — 80053 COMPREHEN METABOLIC PANEL: CPT

## 2022-02-06 PROCEDURE — 85025 COMPLETE CBC W/AUTO DIFF WBC: CPT

## 2022-02-06 PROCEDURE — 80143 DRUG ASSAY ACETAMINOPHEN: CPT

## 2022-02-06 PROCEDURE — 85610 PROTHROMBIN TIME: CPT

## 2022-02-06 PROCEDURE — 72128 CT CHEST SPINE W/O DYE: CPT

## 2022-02-06 PROCEDURE — 72125 CT NECK SPINE W/O DYE: CPT

## 2022-02-06 PROCEDURE — 6360000002 HC RX W HCPCS: Performed by: EMERGENCY MEDICINE

## 2022-02-06 PROCEDURE — 74177 CT ABD & PELVIS W/CONTRAST: CPT

## 2022-02-06 RX ORDER — POLYETHYLENE GLYCOL 3350 17 G/17G
17 POWDER, FOR SOLUTION ORAL DAILY
Status: DISCONTINUED | OUTPATIENT
Start: 2022-02-07 | End: 2022-02-08 | Stop reason: HOSPADM

## 2022-02-06 RX ORDER — SODIUM CHLORIDE 9 MG/ML
INJECTION, SOLUTION INTRAVENOUS CONTINUOUS
Status: DISCONTINUED | OUTPATIENT
Start: 2022-02-06 | End: 2022-02-08 | Stop reason: HOSPADM

## 2022-02-06 RX ORDER — SODIUM CHLORIDE 9 MG/ML
25 INJECTION, SOLUTION INTRAVENOUS PRN
Status: DISCONTINUED | OUTPATIENT
Start: 2022-02-06 | End: 2022-02-08 | Stop reason: HOSPADM

## 2022-02-06 RX ORDER — KETOROLAC TROMETHAMINE 30 MG/ML
15 INJECTION, SOLUTION INTRAMUSCULAR; INTRAVENOUS ONCE
Status: COMPLETED | OUTPATIENT
Start: 2022-02-06 | End: 2022-02-06

## 2022-02-06 RX ORDER — METHOCARBAMOL 500 MG/1
1000 TABLET, FILM COATED ORAL 4 TIMES DAILY
Status: DISCONTINUED | OUTPATIENT
Start: 2022-02-06 | End: 2022-02-08 | Stop reason: HOSPADM

## 2022-02-06 RX ORDER — ONDANSETRON 2 MG/ML
4 INJECTION INTRAMUSCULAR; INTRAVENOUS ONCE
Status: COMPLETED | OUTPATIENT
Start: 2022-02-06 | End: 2022-02-06

## 2022-02-06 RX ORDER — FENTANYL CITRATE 50 UG/ML
50 INJECTION, SOLUTION INTRAMUSCULAR; INTRAVENOUS ONCE
Status: COMPLETED | OUTPATIENT
Start: 2022-02-06 | End: 2022-02-06

## 2022-02-06 RX ORDER — GABAPENTIN 100 MG/1
100 CAPSULE ORAL EVERY 8 HOURS
Status: DISCONTINUED | OUTPATIENT
Start: 2022-02-06 | End: 2022-02-08 | Stop reason: HOSPADM

## 2022-02-06 RX ORDER — SODIUM CHLORIDE 0.9 % (FLUSH) 0.9 %
10 SYRINGE (ML) INJECTION EVERY 12 HOURS SCHEDULED
Status: DISCONTINUED | OUTPATIENT
Start: 2022-02-06 | End: 2022-02-08 | Stop reason: HOSPADM

## 2022-02-06 RX ORDER — ACETAMINOPHEN 325 MG/1
650 TABLET ORAL EVERY 6 HOURS
Status: DISCONTINUED | OUTPATIENT
Start: 2022-02-06 | End: 2022-02-08 | Stop reason: HOSPADM

## 2022-02-06 RX ORDER — ONDANSETRON 4 MG/1
4 TABLET, ORALLY DISINTEGRATING ORAL EVERY 8 HOURS PRN
Status: DISCONTINUED | OUTPATIENT
Start: 2022-02-06 | End: 2022-02-08 | Stop reason: HOSPADM

## 2022-02-06 RX ORDER — SODIUM CHLORIDE 0.9 % (FLUSH) 0.9 %
10 SYRINGE (ML) INJECTION PRN
Status: DISCONTINUED | OUTPATIENT
Start: 2022-02-06 | End: 2022-02-08 | Stop reason: HOSPADM

## 2022-02-06 RX ORDER — FENTANYL CITRATE 50 UG/ML
INJECTION, SOLUTION INTRAMUSCULAR; INTRAVENOUS
Status: COMPLETED
Start: 2022-02-06 | End: 2022-02-06

## 2022-02-06 RX ORDER — KETOROLAC TROMETHAMINE 30 MG/ML
15 INJECTION, SOLUTION INTRAMUSCULAR; INTRAVENOUS ONCE
Status: DISCONTINUED | OUTPATIENT
Start: 2022-02-06 | End: 2022-02-06

## 2022-02-06 RX ORDER — ONDANSETRON 2 MG/ML
4 INJECTION INTRAMUSCULAR; INTRAVENOUS EVERY 6 HOURS PRN
Status: DISCONTINUED | OUTPATIENT
Start: 2022-02-06 | End: 2022-02-08 | Stop reason: HOSPADM

## 2022-02-06 RX ORDER — CYCLOBENZAPRINE HCL 10 MG
10 TABLET ORAL ONCE
Status: COMPLETED | OUTPATIENT
Start: 2022-02-06 | End: 2022-02-06

## 2022-02-06 RX ADMIN — FENTANYL CITRATE 50 MCG: 50 INJECTION, SOLUTION INTRAMUSCULAR; INTRAVENOUS at 14:40

## 2022-02-06 RX ADMIN — CYCLOBENZAPRINE HYDROCHLORIDE 10 MG: 10 TABLET, FILM COATED ORAL at 19:29

## 2022-02-06 RX ADMIN — ONDANSETRON 4 MG: 2 INJECTION INTRAMUSCULAR; INTRAVENOUS at 17:55

## 2022-02-06 RX ADMIN — FENTANYL CITRATE 50 MCG: 50 INJECTION, SOLUTION INTRAMUSCULAR; INTRAVENOUS at 22:14

## 2022-02-06 RX ADMIN — IOPAMIDOL 75 ML: 755 INJECTION, SOLUTION INTRAVENOUS at 14:41

## 2022-02-06 RX ADMIN — KETOROLAC TROMETHAMINE 15 MG: 30 INJECTION, SOLUTION INTRAMUSCULAR; INTRAVENOUS at 19:29

## 2022-02-06 ASSESSMENT — ENCOUNTER SYMPTOMS
DIARRHEA: 0
EYE PAIN: 0
WHEEZING: 0
ABDOMINAL PAIN: 0
SORE THROAT: 0
NAUSEA: 0
VOMITING: 0
SHORTNESS OF BREATH: 0
FACIAL SWELLING: 0
EYE REDNESS: 0
STRIDOR: 0
RHINORRHEA: 0
BACK PAIN: 1
NAUSEA: 0
EYE PAIN: 0
EYE DISCHARGE: 0
SHORTNESS OF BREATH: 0
ABDOMINAL DISTENTION: 0
COLOR CHANGE: 0
BACK PAIN: 0
COUGH: 0
WHEEZING: 0
VOMITING: 0
SINUS PRESSURE: 0

## 2022-02-06 ASSESSMENT — PAIN SCALES - GENERAL
PAINLEVEL_OUTOF10: 8
PAINLEVEL_OUTOF10: 10
PAINLEVEL_OUTOF10: 5

## 2022-02-06 NOTE — ED PROVIDER NOTES
Chief Complaint   Patient presents with    Fall     slipped on ice     Head Injury       Patient is a 56-year-old female presents today for trauma evaluation. She states earlier this afternoon she was walking into [employer] to her daughters basketball game, she slipped on ice falling and hitting her head. She believes she lost consciousness. She woke up at some point afterwards and was able to crawl to her car. She states she did at that point pass out and wake up at unknown time later sitting in her passenger seat. She at that point states that she did start to drive home before one of her children told her to stop driving and call family members. Patient is unsure of exactly how she got to the hospital today. On arrival she is alert and oriented to person, place and time, moving all extremities. She endorses headache, pain in her lower back and her extremities bilaterally. Symptoms are worsened with movement. She has not had a medication for the symptoms. She denies take any blood thinning medications. The history is provided by the patient. No  was used. Review of Systems   Constitutional: Negative for chills and fever. HENT: Negative for ear pain, sinus pressure and sore throat. Eyes: Negative for pain, discharge and redness. Respiratory: Negative for cough, shortness of breath and wheezing. Cardiovascular: Negative for chest pain. Gastrointestinal: Negative for abdominal distention, diarrhea, nausea and vomiting. Genitourinary: Negative for dysuria and frequency. Musculoskeletal: Positive for arthralgias and neck pain. Negative for back pain. Skin: Negative for rash and wound. Neurological: Positive for syncope and headaches. Negative for weakness. Hematological: Negative for adenopathy. Psychiatric/Behavioral: Positive for confusion. All other systems reviewed and are negative. Physical Exam  Vitals and nursing note reviewed. Constitutional:       Appearance: Normal appearance. She is well-developed. She is not ill-appearing. HENT:      Head: Normocephalic and atraumatic. Eyes:      Pupils: Pupils are equal, round, and reactive to light. Neck:      Comments: In c collar  Midline tenderness  Cardiovascular:      Rate and Rhythm: Normal rate and regular rhythm. Pulses: Normal pulses. Heart sounds: Normal heart sounds. Pulmonary:      Effort: Pulmonary effort is normal. No respiratory distress. Breath sounds: Normal breath sounds. No wheezing or rales. Abdominal:      General: Bowel sounds are normal.      Palpations: Abdomen is soft. Tenderness: There is no abdominal tenderness. There is no guarding or rebound. Musculoskeletal:         General: Tenderness present. Cervical back: Normal range of motion and neck supple. Tenderness present. Right lower leg: No edema. Left lower leg: No edema. Comments: Tenderness palpation of the cervical, thoracic and lumbar region, she is in c-collar, range of motion of upper and lower extremities equal and symmetric, no obvious joint deformity   Skin:     General: Skin is warm and dry. Capillary Refill: Capillary refill takes less than 2 seconds. Neurological:      General: No focal deficit present. Mental Status: She is alert and oriented to person, place, and time. Cranial Nerves: No cranial nerve deficit.       Coordination: Coordination normal.      Comments: GCs 15          Procedures     Labs Reviewed   COMPREHENSIVE METABOLIC PANEL W/ REFLEX TO MG FOR LOW K - Abnormal; Notable for the following components:       Result Value    CO2 21 (*)     Glucose 117 (*)     All other components within normal limits   APTT - Abnormal; Notable for the following components:    aPTT 24.0 (*)     All other components within normal limits   SERUM DRUG SCREEN - Abnormal; Notable for the following components:    Acetaminophen Level <5.0 (*)     All other components within normal limits   CBC WITH AUTO DIFFERENTIAL   TROPONIN   BRAIN NATRIURETIC PEPTIDE   PROTIME-INR   LACTIC ACID, PLASMA   URINALYSIS   URINE DRUG SCREEN   POC PREGNANCY UR-QUAL   POCT GLUCOSE     CT HEAD WO CONTRAST   Final Result   No acute intracranial abnormality. Specifically, there is no acute   intracranial hemorrhage. CT CERVICAL SPINE WO CONTRAST   Final Result   1. There is no acute compression fracture or subluxation of the cervical   spine. 2. Mild multilevel degenerative disc and degenerative joint disease most   prominent at the C6-7 level. CT LUMBAR SPINE WO CONTRAST   Final Result   No acute fracture is identified. CT THORACIC SPINE WO CONTRAST   Final Result   No acute fractures or dislocations in the thoracic spine         CT ABDOMEN PELVIS W IV CONTRAST Additional Contrast? None   Final Result   No specific acute abnormality is identified. CTA PULMONARY W CONTRAST   Final Result   1. No acute pulmonary emboli. 2.  No signs for acute pulmonary process or pulmonary contusion. No   pneumothorax or subcutaneous emphysema or pneumomediastinum. 3.  No conspicuous acute displaced fractures identified in the bone   structures of the right ribcage. 4.  The postoperative changes after implantation of tendons in the humeral   head greater tuberosity area. 5.  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 .           EKG #1:   I personally interpreted this EKG  Time:  1455    Rate: 73  Rhythm: Sinus. Interpretation: normal sinus rhythm. MDM  Number of Diagnoses or Management Options  Fall from ground level  Injury of head, initial encounter  Neck pain  Syncope and collapse  Diagnosis management comments: Patient is a 46-year-old female presents today after ground-level fall multiple syncopal episodes. On physical exam she is alert and oriented, however she does endorse pain in her cervical spine and lower lumbar spine.   She endorses multiple syncopal episodes today. On arrival she is alert and oriented x3, GCS is 15. She is moving all extremities equally. She is in c-collar. Labs and imaging obtained. CT imaging shows no acute fractures or dislocations, CTA shows no evidence of pulmonary embolism, syncope unlikely related to cardiac etiology. CT head shows no bleeding or intracranial abnormalities. Labs within normal limits. We attempted to clear C-spine, however she is still having midline cervical tenderness endorsing pain in that area. [facility] transfer to [facility] for trauma evaluation. I did speak with on-call ER physician who is accepted as transfer. Patient agrees with this plan. She was given pain medication in the department. She has remained hemodynamically stable       Amount and/or Complexity of Data Reviewed  Clinical lab tests: reviewed  Tests in the radiology section of CPT®: reviewed  Tests in the medicine section of CPT®: reviewed             ED Course as of 02/06/22 1926   Sun Feb 06, 2022   6945 ATTENDING PROVIDER ATTESTATION:     I have personally performed and/or participated in the history, exam, medical decision making, and procedures and agree with all pertinent clinical information unless otherwise noted. I have also reviewed and agree with the past medical, family and social history unless otherwise noted. I have discussed this patient in detail with the resident, and provided the instruction and education regarding the patient. My findings/plan:   38year old female presenting for evaluation of fall. Patient states that she was at [facility] when she slipped on ice fell backwards. When she got back to her car and then went unresponsive for about 2 hours. Upon arrival the patient appears anxious. She is complaining of low back discomfort. She is a GCS of 15Pupils are equal and reactiveShe has tenderness palpation of T-spine and L-spine. She states that she feels like there is some numbness going down her bilateral lower extremity. She does withdrawal and react to pain. Plan for pan scan and work-up. [BB]      ED Course User Index  [BB] Carmenza Calixto DO       --------------------------------------------- PAST HISTORY ---------------------------------------------  Past Medical History:  has a past medical history of ADHD, Depression, PONV (postoperative nausea and vomiting), and Tear of left meniscus as current injury. Past Surgical History:  has a past surgical history that includes Anterior cruciate ligament repair (Left, 1998); Dilation and curettage of uterus (2012); Carpal tunnel release (Right, 4/2015); Rotator cuff repair (6/10/15); Rotator cuff repair (4/2015); other surgical history (Right, 11/18/15); and Anterior cruciate ligament repair (Left, 7/23/2020). Social History:  reports that she has been smoking cigarettes. She has a 2.50 pack-year smoking history. She has never used smokeless tobacco. She reports that she does not drink alcohol and does not use drugs. Family History: family history is not on file. The patients home medications have been reviewed. Allergies: Patient has no known allergies.     -------------------------------------------------- RESULTS -------------------------------------------------    LABS:  Results for orders placed or performed during the hospital encounter of 02/06/22   CBC Auto Differential   Result Value Ref Range    WBC 8.8 4.5 - 11.5 E9/L    RBC 4.46 3.50 - 5.50 E12/L    Hemoglobin 12.9 11.5 - 15.5 g/dL    Hematocrit 39.9 34.0 - 48.0 %    MCV 89.5 80.0 - 99.9 fL    MCH 28.9 26.0 - 35.0 pg    MCHC 32.3 32.0 - 34.5 %    RDW 13.5 11.5 - 15.0 fL    Platelets 403 244 - 902 E9/L    MPV 10.3 7.0 - 12.0 fL    Neutrophils % 61.7 43.0 - 80.0 %    Immature Granulocytes % 0.2 0.0 - 5.0 %    Lymphocytes % 29.1 20.0 - 42.0 %    Monocytes % 8.0 2.0 - 12.0 %    Eosinophils % 0.7 0.0 - 6.0 %    Basophils % 0.3 0.0 - 2.0 %    Neutrophils Absolute 5.41 1.80 - 7.30 E9/L    Immature Granulocytes # 0.02 E9/L    Lymphocytes Absolute 2.55 1.50 - 4.00 E9/L    Monocytes Absolute 0.70 0.10 - 0.95 E9/L    Eosinophils Absolute 0.06 0.05 - 0.50 E9/L    Basophils Absolute 0.03 0.00 - 0.20 E9/L   Comprehensive Metabolic Panel w/ Reflex to MG   Result Value Ref Range    Sodium 136 132 - 146 mmol/L    Potassium reflex Magnesium 3.9 3.5 - 5.0 mmol/L    Chloride 100 98 - 107 mmol/L    CO2 21 (L) 22 - 29 mmol/L    Anion Gap 15 7 - 16 mmol/L    Glucose 117 (H) 74 - 99 mg/dL    BUN 15 6 - 20 mg/dL    CREATININE 0.7 0.5 - 1.0 mg/dL    GFR Non-African American >60 >=60 mL/min/1.73    GFR African American >60     Calcium 9.2 8.6 - 10.2 mg/dL    Total Protein 7.5 6.4 - 8.3 g/dL    Albumin 4.9 3.5 - 5.2 g/dL    Total Bilirubin 0.6 0.0 - 1.2 mg/dL    Alkaline Phosphatase 80 35 - 104 U/L    ALT 8 0 - 32 U/L    AST 13 0 - 31 U/L   Troponin   Result Value Ref Range    Troponin, High Sensitivity <6 0 - 9 ng/L   Brain Natriuretic Peptide   Result Value Ref Range    Pro-BNP 22 0 - 125 pg/mL   Protime-INR   Result Value Ref Range    Protime 12.4 9.3 - 12.4 sec    INR 1.1    APTT   Result Value Ref Range    aPTT 24.0 (L) 24.5 - 35.1 sec   Lactic Acid, Plasma   Result Value Ref Range    Lactic Acid 1.4 0.5 - 2.2 mmol/L   Serum Drug Screen   Result Value Ref Range    Ethanol Lvl <10 mg/dL    Acetaminophen Level <5.0 (L) 10.0 - 20.7 mcg/mL    Salicylate, Serum <6.0 0.0 - 30.0 mg/dL   EKG 12 Lead   Result Value Ref Range    Ventricular Rate 73 BPM    Atrial Rate 73 BPM    P-R Interval 166 ms    QRS Duration 90 ms    Q-T Interval 420 ms    QTc Calculation (Bazett) 462 ms    P Axis 63 degrees    R Axis 70 degrees    T Axis 59 degrees       RADIOLOGY:  CT HEAD WO CONTRAST   Final Result   No acute intracranial abnormality. Specifically, there is no acute   intracranial hemorrhage. CT CERVICAL SPINE WO CONTRAST   Final Result   1.  There is no acute compression fracture or subluxation of the cervical   spine. 2. Mild multilevel degenerative disc and degenerative joint disease most   prominent at the C6-7 level. CT LUMBAR SPINE WO CONTRAST   Final Result   No acute fracture is identified. CT THORACIC SPINE WO CONTRAST   Final Result   No acute fractures or dislocations in the thoracic spine         CT ABDOMEN PELVIS W IV CONTRAST Additional Contrast? None   Final Result   No specific acute abnormality is identified. CTA PULMONARY W CONTRAST   Final Result   1. No acute pulmonary emboli. 2.  No signs for acute pulmonary process or pulmonary contusion. No   pneumothorax or subcutaneous emphysema or pneumomediastinum. 3.  No conspicuous acute displaced fractures identified in the bone   structures of the right ribcage. 4.  The postoperative changes after implantation of tendons in the humeral   head greater tuberosity area. 5.  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 Ike Lr ------------------------- NURSING NOTES AND VITALS REVIEWED ---------------------------  Date / Time Roomed:  2/6/2022  1:55 PM  ED Bed Assignment:  15/15    The nursing notes within the ED encounter and vital signs as below have been reviewed. Patient Vitals for the past 24 hrs:   BP Temp Temp src Pulse Resp SpO2 Height Weight   02/06/22 1358 (!) 147/93 98.3 °F (36.8 °C) Oral 102 14 96 % 5' 3\" (1.6 m) 140 lb (63.5 kg)       Oxygen Saturation Interpretation: Normal    ------------------------------------------ PROGRESS NOTES ------------------------------------------    Counseling:  I have spoken with the patient and discussed todays results, in addition to providing specific details for the plan of care and counseling regarding the diagnosis and prognosis.   Their questions are answered at this time and they are agreeable with the plan of admission.    --------------------------------- ADDITIONAL PROVIDER NOTES ---------------------------------    Medications   ketorolac (TORADOL) injection 15 mg (has no administration in time range)   cyclobenzaprine (FLEXERIL) tablet 10 mg (has no administration in time range)   ondansetron (ZOFRAN) injection 4 mg (4 mg IntraVENous Given 2/6/22 7171)   iopamidol (ISOVUE-370) 76 % injection 75 mL (75 mLs IntraVENous Given 2/6/22 1441)   fentaNYL (SUBLIMAZE) injection 50 mcg (50 mcg IntraVENous Given 2/6/22 1440)         Diagnosis:  1. Syncope and collapse    2. Fall from ground level    3. Injury of head, initial encounter    4. Neck pain        Disposition:  Patient's disposition: transfer to [facility] for trauma  Patient's condition is stable.              Shekhar Sánchez DO  Resident  02/06/22 5346

## 2022-02-06 NOTE — PROGRESS NOTES
Radiology Procedure Waiver   Name: Zachery Barnes  : 1980  MRN: [id number]    Date:  22    Time: 2:25 PM EST    Benefits of immediately proceeding with Radiology exam(s) without pre-testing outweigh the risks or are not indicated as specified below and therefore the following is/are being waived:    [x] Pregnancy test   [] Patients LMP on-time and regular.   [] Patient had Tubal Ligation or has other Contraception Device. [] Patient  is Menopausal or Premenarcheal.    [] Patient had Full or Partial Hysterectomy. [] Protocol for Iodine allergy    [] MRI Questionnaire     [x] BUN/Creatinine   [] Patient age w/no hx of renal dysfunction. [] Patient on Dialysis. [] Recent Normal Labs.   Electronically signed by Olive Estrada DO on 22 at 2:25 PM EST

## 2022-02-07 ENCOUNTER — APPOINTMENT (OUTPATIENT)
Dept: MRI IMAGING | Age: 42
End: 2022-02-07
Payer: COMMERCIAL

## 2022-02-07 PROBLEM — R53.1 LEFT-SIDED WEAKNESS: Status: ACTIVE | Noted: 2022-02-07

## 2022-02-07 LAB
ANION GAP SERPL CALCULATED.3IONS-SCNC: 15 MMOL/L (ref 7–16)
BUN BLDV-MCNC: 15 MG/DL (ref 6–20)
CALCIUM SERPL-MCNC: 8.9 MG/DL (ref 8.6–10.2)
CHLORIDE BLD-SCNC: 104 MMOL/L (ref 98–107)
CO2: 20 MMOL/L (ref 22–29)
CREAT SERPL-MCNC: 0.7 MG/DL (ref 0.5–1)
GFR AFRICAN AMERICAN: >60
GFR NON-AFRICAN AMERICAN: >60 ML/MIN/1.73
GLUCOSE BLD-MCNC: 80 MG/DL (ref 74–99)
HCG QUALITATIVE: NEGATIVE
HCT VFR BLD CALC: 38.6 % (ref 34–48)
HEMOGLOBIN: 12.6 G/DL (ref 11.5–15.5)
MAGNESIUM: 2.2 MG/DL (ref 1.6–2.6)
MCH RBC QN AUTO: 29.2 PG (ref 26–35)
MCHC RBC AUTO-ENTMCNC: 32.6 % (ref 32–34.5)
MCV RBC AUTO: 89.6 FL (ref 80–99.9)
PDW BLD-RTO: 13.4 FL (ref 11.5–15)
PLATELET # BLD: 315 E9/L (ref 130–450)
PMV BLD AUTO: 10.9 FL (ref 7–12)
POTASSIUM REFLEX MAGNESIUM: 3.5 MMOL/L (ref 3.5–5)
RBC # BLD: 4.31 E12/L (ref 3.5–5.5)
SODIUM BLD-SCNC: 139 MMOL/L (ref 132–146)
WBC # BLD: 6.5 E9/L (ref 4.5–11.5)

## 2022-02-07 PROCEDURE — 84703 CHORIONIC GONADOTROPIN ASSAY: CPT

## 2022-02-07 PROCEDURE — 2580000003 HC RX 258

## 2022-02-07 PROCEDURE — 99204 OFFICE O/P NEW MOD 45 MIN: CPT | Performed by: NEUROLOGICAL SURGERY

## 2022-02-07 PROCEDURE — 97166 OT EVAL MOD COMPLEX 45 MIN: CPT

## 2022-02-07 PROCEDURE — 96372 THER/PROPH/DIAG INJ SC/IM: CPT

## 2022-02-07 PROCEDURE — 97530 THERAPEUTIC ACTIVITIES: CPT

## 2022-02-07 PROCEDURE — G0378 HOSPITAL OBSERVATION PER HR: HCPCS

## 2022-02-07 PROCEDURE — 6360000002 HC RX W HCPCS: Performed by: SURGERY

## 2022-02-07 PROCEDURE — 72146 MRI CHEST SPINE W/O DYE: CPT

## 2022-02-07 PROCEDURE — 99232 SBSQ HOSP IP/OBS MODERATE 35: CPT | Performed by: SURGERY

## 2022-02-07 PROCEDURE — 36415 COLL VENOUS BLD VENIPUNCTURE: CPT

## 2022-02-07 PROCEDURE — 6370000000 HC RX 637 (ALT 250 FOR IP)

## 2022-02-07 PROCEDURE — 97161 PT EVAL LOW COMPLEX 20 MIN: CPT

## 2022-02-07 PROCEDURE — 85027 COMPLETE CBC AUTOMATED: CPT

## 2022-02-07 PROCEDURE — 72141 MRI NECK SPINE W/O DYE: CPT

## 2022-02-07 PROCEDURE — 80048 BASIC METABOLIC PNL TOTAL CA: CPT

## 2022-02-07 PROCEDURE — 83735 ASSAY OF MAGNESIUM: CPT

## 2022-02-07 PROCEDURE — 72148 MRI LUMBAR SPINE W/O DYE: CPT

## 2022-02-07 RX ADMIN — METHOCARBAMOL 1000 MG: 500 TABLET ORAL at 13:23

## 2022-02-07 RX ADMIN — ACETAMINOPHEN 650 MG: 325 TABLET ORAL at 00:34

## 2022-02-07 RX ADMIN — GABAPENTIN 100 MG: 100 CAPSULE ORAL at 15:01

## 2022-02-07 RX ADMIN — GABAPENTIN 100 MG: 100 CAPSULE ORAL at 23:24

## 2022-02-07 RX ADMIN — ACETAMINOPHEN 650 MG: 325 TABLET ORAL at 13:23

## 2022-02-07 RX ADMIN — Medication 10 ML: at 07:53

## 2022-02-07 RX ADMIN — Medication 10 ML: at 20:43

## 2022-02-07 RX ADMIN — METHOCARBAMOL 1000 MG: 500 TABLET ORAL at 07:53

## 2022-02-07 RX ADMIN — GABAPENTIN 100 MG: 100 CAPSULE ORAL at 00:34

## 2022-02-07 RX ADMIN — ACETAMINOPHEN 650 MG: 325 TABLET ORAL at 23:24

## 2022-02-07 RX ADMIN — METHOCARBAMOL 1000 MG: 500 TABLET ORAL at 20:42

## 2022-02-07 RX ADMIN — ACETAMINOPHEN 650 MG: 325 TABLET ORAL at 17:21

## 2022-02-07 RX ADMIN — ENOXAPARIN SODIUM 30 MG: 100 INJECTION SUBCUTANEOUS at 23:24

## 2022-02-07 RX ADMIN — METHOCARBAMOL 1000 MG: 500 TABLET ORAL at 17:21

## 2022-02-07 RX ADMIN — GABAPENTIN 100 MG: 100 CAPSULE ORAL at 07:52

## 2022-02-07 RX ADMIN — SODIUM CHLORIDE: 9 INJECTION, SOLUTION INTRAVENOUS at 00:27

## 2022-02-07 RX ADMIN — ENOXAPARIN SODIUM 30 MG: 100 INJECTION SUBCUTANEOUS at 13:24

## 2022-02-07 RX ADMIN — ACETAMINOPHEN 650 MG: 325 TABLET ORAL at 05:36

## 2022-02-07 RX ADMIN — METHOCARBAMOL 1000 MG: 500 TABLET ORAL at 00:34

## 2022-02-07 ASSESSMENT — PAIN DESCRIPTION - ONSET
ONSET: ON-GOING

## 2022-02-07 ASSESSMENT — PAIN DESCRIPTION - LOCATION
LOCATION: BACK;NECK

## 2022-02-07 ASSESSMENT — PAIN - FUNCTIONAL ASSESSMENT: PAIN_FUNCTIONAL_ASSESSMENT: ACTIVITIES ARE NOT PREVENTED

## 2022-02-07 ASSESSMENT — ENCOUNTER SYMPTOMS
PHOTOPHOBIA: 0
TROUBLE SWALLOWING: 0
SHORTNESS OF BREATH: 0
BACK PAIN: 1
ABDOMINAL PAIN: 0

## 2022-02-07 ASSESSMENT — PAIN SCALES - GENERAL
PAINLEVEL_OUTOF10: 0
PAINLEVEL_OUTOF10: 5
PAINLEVEL_OUTOF10: 7
PAINLEVEL_OUTOF10: 3
PAINLEVEL_OUTOF10: 0
PAINLEVEL_OUTOF10: 10
PAINLEVEL_OUTOF10: 0
PAINLEVEL_OUTOF10: 6

## 2022-02-07 ASSESSMENT — PAIN DESCRIPTION - PAIN TYPE
TYPE: ACUTE PAIN

## 2022-02-07 ASSESSMENT — PAIN DESCRIPTION - DESCRIPTORS
DESCRIPTORS: ACHING;DISCOMFORT;SORE
DESCRIPTORS: ACHING;DISCOMFORT;SORE
DESCRIPTORS: ACHING;CRAMPING;DISCOMFORT
DESCRIPTORS: ACHING;DISCOMFORT;SORE

## 2022-02-07 ASSESSMENT — PAIN DESCRIPTION - PROGRESSION: CLINICAL_PROGRESSION: GRADUALLY IMPROVING

## 2022-02-07 ASSESSMENT — PAIN DESCRIPTION - FREQUENCY
FREQUENCY: CONTINUOUS
FREQUENCY: INTERMITTENT

## 2022-02-07 NOTE — ED NOTES
Attempted to call RN-RN report to [facility]. Was placed on hold or 7 minutes with no answer.       Tawanna Patricia RN  02/06/22 1111

## 2022-02-07 NOTE — CONSULTS
NEUROSURGERY CONSULTATION     Chief Complaint: neck pain and left sided weakness    HPI:   Josette Jo is a 38 y.o.  female who presents to the ED c/o neck pain and back pain after falling. Pt states she was walking to her car when she slipped on ice hitting her head. States she does not remember getting in her car after the incident. Progressively she started to have neck and low back pain. Admits to associated headache and intermittent blurry vision. Movement of her neck makes the symptoms worse. Head CT scan WNL. MRI cervical spine demonstrates moderate canal stenosis at C6-C7 and syrinx at C7 for which neurosurgery was consulted. Denies loss of bowel or bladder, saddle anesthesia, loss of dexterity, gait abnormality, numbness, tingling, fever, chills, SOB, or chest pain. Past Medical History:   Diagnosis Date    ADHD     Depression     controlled with med, per patient    PONV (postoperative nausea and vomiting)     Tear of left meniscus as current injury      Past Surgical History:   Procedure Laterality Date    ANTERIOR CRUCIATE LIGAMENT REPAIR Left 1998    ANTERIOR CRUCIATE LIGAMENT REPAIR Left 7/23/2020    LEFT KNEE ARTHROSCOPY MEDIAL MENISCUS ROOT REPAIR POSSIBLE REVISION ACL RECONSTRUCTION WITH ALLOGRAFT performed by Amando Brown MD at [address] Right 4/2015    DILATION AND CURETTAGE OF UTERUS  2012    OTHER SURGICAL HISTORY Right 11/18/15    RIGHT SHOULDER MANIPULATION & ARTHROSCOPY    ROTATOR CUFF REPAIR  6/10/15    right rotator cuff open revision    ROTATOR CUFF REPAIR  4/2015      History reviewed. No pertinent family history.    Social History     Socioeconomic History    Marital status: Single     Spouse name: Not on file    Number of children: Not on file    Years of education: Not on file    Highest education level: Not on file   Occupational History    Not on file   Tobacco Use    Smoking status: Current Every Day Smoker     Packs/day: 0.50     Years: 5.00 Pack years: 2.50     Types: Cigarettes    Smokeless tobacco: Never Used   Vaping Use    Vaping Use: Never used   Substance and Sexual Activity    Alcohol use: No    Drug use: No    Sexual activity: Not on file   Other Topics Concern    Not on file   Social History Narrative    Not on file     Social Determinants of Health     Financial Resource Strain:     Difficulty of Paying Living Expenses: Not on file   Food Insecurity:     Worried About [address] in the Last Year: Not on file    Ran Out of Food in the Last Year: Not on file   Transportation Needs:     Lack of Transportation (Medical): Not on file    Lack of Transportation (Non-Medical):  Not on file   Physical Activity:     Days of Exercise per Week: Not on file    Minutes of Exercise per Session: Not on file   Stress:     Feeling of Stress : Not on file   Social Connections:     Frequency of Communication with Friends and Family: Not on file    Frequency of Social Gatherings with Friends and Family: Not on file    Attends [employer] Services: Not on file    Active Member of Clubs or Organizations: Not on file    Attends Club or Organization Meetings: Not on file    Marital Status: Not on file   Intimate Partner Violence:     Fear of Current or Ex-Partner: Not on file    Emotionally Abused: Not on file    Physically Abused: Not on file    Sexually Abused: Not on file   Housing Stability:     Unable to Pay for Housing in the Last Year: Not on file    Number of [place] in the Last Year: Not on file    Unstable Housing in the Last Year: Not on file       Medications:   Current Facility-Administered Medications   Medication Dose Route Frequency Provider Last Rate Last Admin    enoxaparin (LOVENOX) injection 30 mg  30 mg SubCUTAneous BID Gabriela Berger MD   30 mg at 02/07/22 1324    sodium chloride flush 0.9 % injection 10 mL  10 mL IntraVENous 2 times per day Mike Quesada MD   10 mL at 02/07/22 0753    sodium chloride flush 0.9 % injection 10 mL  10 mL IntraVENous PRN Kerline Calderon MD        0.9 % sodium chloride infusion  25 mL IntraVENous PRN Timbo Modi MD        ondansetron (ZOFRAN-ODT) disintegrating tablet 4 mg  4 mg Oral Q8H PRN Timbo Modi MD        Or    ondansetron (ZOFRAN) injection 4 mg  4 mg IntraVENous Q6H PRN Timbo Modi MD        polyethylene glycol (GLYCOLAX) packet 17 g  17 g Oral Daily Kerline Calderon MD        0.9 % sodium chloride infusion   IntraVENous Continuous Timbo Modi MD   Stopped at 02/07/22 0609    acetaminophen (TYLENOL) tablet 650 mg  650 mg Oral Q6H Kerline Calderon MD   650 mg at 02/07/22 1721    methocarbamol (ROBAXIN) tablet 1,000 mg  1,000 mg Oral 4x Daily Timbo Modi MD   1,000 mg at 02/07/22 1721    gabapentin (NEURONTIN) capsule 100 mg  100 mg Oral Q8H Timbo Modi MD   100 mg at 02/07/22 1501        Allergies:    Patient has no known allergies. Review of Systems   Constitutional: Negative for fever and unexpected weight change. HENT: Negative for trouble swallowing. Eyes: Positive for visual disturbance. Negative for photophobia. Respiratory: Negative for shortness of breath. Cardiovascular: Negative for chest pain. Gastrointestinal: Negative for abdominal pain. Endocrine: Negative for heat intolerance. Genitourinary: Negative for flank pain. Musculoskeletal: Positive for back pain, myalgias and neck pain. Negative for gait problem. Skin: Negative for wound. Neurological: Positive for headaches. Negative for weakness and numbness. Psychiatric/Behavioral: Negative for confusion. Physical Exam  Constitutional:       Appearance: Normal appearance. She is well-developed. HENT:      Head: Normocephalic and atraumatic. Eyes:      Extraocular Movements: Extraocular movements intact. Conjunctiva/sclera: Conjunctivae normal.      Pupils: Pupils are equal, round, and reactive to light. Neck:      Comments:  In cervical collar  Tenderness to palpation of midline cervical spine  Cardiovascular:      Rate and Rhythm: Normal rate. Pulmonary:      Effort: Pulmonary effort is normal.   Abdominal:      General: There is no distension. Musculoskeletal:      Cervical back: Neck supple. Skin:     General: Skin is warm and dry. Neurological:      Mental Status: She is alert. Comments: Alert and oriented x3  CN3-12 intact  Motor strength full  Sensation intact to light touch  No Hoffmans  No clonus   Psychiatric:         Thought Content: Thought content normal.          /89   Pulse 86   Temp 97.8 °F (36.6 °C) (Temporal)   Resp 16   Ht 5' 3\" (1.6 m)   Wt 140 lb (63.5 kg)   SpO2 100%   BMI 24.80 kg/m²        Assessment:   Cervical strain   Moderate cervical stenosis at C6-C7 without radicular symptoms     Plan:  -No surgical intervention   -Custom cervical collar ordered. Wear for 2 weeks  -Pain control  -PT/OT in collar  -Follow up in neurosurgery clinic in 2 weeks with cervical x-rays      Electronically signed by Igor Ellis PA-C on 2/7/2022 at 7:34 PM     I have interviewed and examined the patient and I have spent 30 minutes discussing her condition and the treatment plan and in medical decision making. Her MRI's on;y show moderate cervical stenosis. We will manage this in a cervical collar. . F/U in 2 weeks with x-rays    Abby Levin MD

## 2022-02-07 NOTE — H&P
TRAUMA HISTORY & PHYSICAL  Surgical Resident/Advance Practice Nurse  2/6/2022  10:23 PM    PRIMARY SURVEY    CHIEF COMPLAINT:  Trauma consult. Patient transferred from [facility]. States she suffered mechanical fall after slipping on ice earlier today, hit her head and lost consciousness. She woke up later in her call but does not recall getting there. She has abrasions on R forehead and eyebrow. Complaining of pain in her head, cervical spine, thoracic and lumbar spine, shooting burning pain from L glute down left leg as well as weakness in her left arm and leg. Denies nausea, vomiting, light headedness, sensory loss, paresthesias. Denies anticoagulant and NSAID use    CT Head, c-spine, t-spine, l-spine, abdomen/pelvis, and CTA chest were all negative. 2018 was worked up for a fall following syncopal episode and complained of head and neck pain as well as left sided weakness. Scans at this time were negative. AIRWAY:   Airway Normal  EMS ETT Absent  Noisy respirations Absent  Retractions: Absent  Vomiting/bleeding: Absent      BREATHING:    Midaxillary breath sound left:  Normal  Midaxillary breath sound right:  Normal    Cough sound intensity:  fair   FiO2:  Room air    SMI 2500 mL.       CIRCULATION:   Femerol pulse intensity: Strong  Palpebral conjunctiva: Pink     Vitals:    02/06/22 2145   BP:    Pulse: 80   Resp: 16   Temp:    SpO2: 98%       Vitals:    02/06/22 2121 02/06/22 2130 02/06/22 2145   BP: 122/85 118/85    Pulse: 88 80 80   Resp: 16 22 16   Temp: 97.5 °F (36.4 °C)     SpO2: 100% 100% 98%        FAST EXAM: Deferred    Central Nervous System    GCS Initial 15 minutes   Eye  Motor  Verbal 4 - Opens eyes on own  6 - Follows simple motor commands  5 - Alert and oriented 4 - Opens eyes on own  6 - Follows simple motor commands  5 - Alert and oriented     Neuromuscular blockade: No  Pupil size:  Left 4 mm    Right 4 mm  Pupil reaction: Yes    Wiggles fingers: Left Yes Right Yes  Wiggles toes: Left Yes Right Yes    Hand grasp:   Left  Present      Right  Present  Plantar flexion: Left  Present      Right   Present    Loss of consciousness:  Yes    History Obtained From:  Patient   Private Medical Doctor: Renata Severe, DO      Pre-exisiting Medical History:  yes    Conditions:   Past Medical History:   Diagnosis Date    ADHD     Depression     controlled with med, per patient    PONV (postoperative nausea and vomiting)     Tear of left meniscus as current injury          Medications:   No current facility-administered medications for this encounter. Current Outpatient Medications   Medication Sig Dispense Refill    cyclobenzaprine (FLEXERIL) 10 MG tablet Take 1 tablet by mouth 3 times daily as needed for Muscle spasms 10 tablet 0    naproxen (NAPROSYN) 500 MG tablet Take 1 tablet by mouth 2 times daily as needed for Pain (take with food and full glass of water.) 14 tablet 0    amphetamine-dextroamphetamine (ADDERALL) 20 MG tablet Take 20 mg by mouth Daily with lunch.  ARIPiprazole (ABILIFY) 5 MG tablet Take 5 mg by mouth nightly       lamoTRIgine (LAMICTAL) 200 MG tablet Take 200 mg by mouth nightly       VYVANSE 70 MG capsule Take 70 mg by mouth every morning.        sertraline (ZOLOFT) 50 MG tablet Take 50 mg by mouth nightly       Multiple Vitamins-Minerals (THERAPEUTIC MULTIVITAMIN-MINERALS) tablet Take 1 tablet by mouth daily           Allergies: No Known Allergies      Social History:   Tobacco use:  positive  Alcohol use:  social drinker  Illicit drug use:  no history of illicit drug use    Past Surgical History:    Past Surgical History:   Procedure Laterality Date    ANTERIOR CRUCIATE LIGAMENT REPAIR Left 1998    ANTERIOR CRUCIATE LIGAMENT REPAIR Left 7/23/2020    LEFT KNEE ARTHROSCOPY MEDIAL MENISCUS ROOT REPAIR POSSIBLE REVISION ACL RECONSTRUCTION WITH ALLOGRAFT performed by Boogie Jay MD at [facility] Right 4/2015    DILATION AND CURETTAGE OF UTERUS  2012    OTHER SURGICAL HISTORY Right 11/18/15    RIGHT SHOULDER MANIPULATION & ARTHROSCOPY    ROTATOR CUFF REPAIR  6/10/15    right rotator cuff open revision    ROTATOR CUFF REPAIR  4/2015         Anticoagulant use: None  Antiplatelet use:   None    NSAID use in last 72 hours: yes  Taken PCN in past:  yes  Last food/drink: today   Last tetanus: unknown     Family History:   No family history of anesthesia complications    Complaints:   Head:  Mild  Neck:   Moderate  Chest:   None  Back:   Moderate  Abdomen:   None  Extremities:   Mild  Comments:     Review of systems:  All negative unless otherwise noted. SECONDARY SURVEY  Head/scalp: Atraumatic    Face: abrasion over L forehead and eyebrow     Eyes/ears/nose: Atraumatic    Pharynx/mouth: Atraumatic    Neck: Atraumatic     Cervical spine tenderness:   Cervical collar in place at time of arrival  Pain:  moderate  ROM:  Not indicated     Chest wall:  Atraumatic    Heart:  Regular rate & rhythm    Abdomen: Atraumatic. Soft ND  Tenderness:  none    Pelvis: Atraumatic  Tenderness: none    Thoracolumbar spine: Atraumatic  Tenderness:  positive    Genitourinary:  Atraumatic. No blood or urine noted    Rectum: Atraumatic. No blood noted. Perineum: Atraumatic. No blood or urine noted. Extremities:   Sensory normal  Motor   Left  Weakness in left UE flexion, extension. (4/5)  Weakness in L digit adduction (4/5)  5/5 wrist extension, 4/5 flexion  Weakness in LLE plantar flexion, 5/5 dorsi flexion. Right extremity motor exam normal     Distal Pulses  Left arm normal  Right arm normal  Left leg normal  Right leg normal    Capillary refill  Left arm normal  Right arm normal  Left leg normal  Right leg normal    Procedures in ED:  none    In the event of Emergency Blood Transfusion:  Due to the critical condition of this patient, I request the immediate release of blood products for emergency transfusion secondary to shock.  I understand the increased risks incurred by the lack of complete transfusion testing.       Radiology: Chest Xray, Pelvic Xray, Ct head, Ct cervical spine, CT chest, CT abdomen and Chest Xray, Pelvic Xray, Ct head, Ct cervical spine, CT chest, CT abdomen, CT thoracic, CT lumbar     Consultations:  Neurosurgery    Admission/Diagnosis: trauma     Plan of Treatment:  Admit for observation   Maintain c-collar  Maintain spinal neutrality   Tertiary examination   UDS   MRI Spine to rule out soft tissue/ligamentous injury   Pain control as needed     Plan discussed with Dr. Destin Toscano    at 2/6/2022 on 10:23 PM    Electronically signed by Ariadna Sargent MD on 2/6/2022 at 10:23 PM

## 2022-02-07 NOTE — PROGRESS NOTES
Trauma Tertiary Survey    Admit Date: 2/6/2022  Hospital day 1    CC:  Mechanical fall from [facility]    Alcohol pre-screening:  Women: How many times in the past year have you had 4 or more drinks in a day? none  How much do you drink on a daily basis? none    Scheduled Meds:   sodium chloride flush  10 mL IntraVENous 2 times per day    polyethylene glycol  17 g Oral Daily    acetaminophen  650 mg Oral Q6H    methocarbamol  1,000 mg Oral 4x Daily    gabapentin  100 mg Oral Q8H     Continuous Infusions:   sodium chloride      sodium chloride 125 mL/hr at 02/07/22 0027     PRN Meds:sodium chloride flush, sodium chloride, ondansetron **OR** ondansetron    Subjective:     No acute events overnight. Patient still having left sided burning and shooting pain. Denies any nausea, vomiting, lightheadedness, numbness and tingling in the extremities. Objective:     Patient Vitals for the past 8 hrs:   BP Temp Temp src Pulse Resp SpO2 Height Weight   02/07/22 0034 126/84 97.6 °F (36.4 °C) Temporal 68 18 100 % 5' 3\" (1.6 m) 140 lb (63.5 kg)   02/07/22 0000 114/84 -- -- 68 19 100 % -- --   02/06/22 2330 108/78 -- -- 70 15 100 % -- --   02/06/22 2300 135/83 -- -- 77 21 98 % -- --   02/06/22 2230 119/81 -- -- 74 22 91 % -- --       No intake/output data recorded. No intake/output data recorded.     Past Medical History:   Diagnosis Date    ADHD     Depression     controlled with med, per patient    PONV (postoperative nausea and vomiting)     Tear of left meniscus as current injury        @[email or web address]@    Radiology:  MRI CERVICAL SPINE WO CONTRAST    (Results Pending)   MRI THORACIC SPINE WO CONTRAST    (Results Pending)   MRI LUMBAR SPINE WO CONTRAST    (Results Pending)       PHYSICAL EXAM:     Central Nervous System  Loss of consciousness:  Yes    GCS:    Eye:  4 - Opens eyes on own  Motor:  6 - Follows simple motor commands  Verbal:  5 - Alert and oriented    Neuromuscular blockade: No  Pupil size:  Left 4 mm    Right 4 mm  Pupil reaction: Yes    Wiggles fingers: Left Yes Right Yes  Wiggles toes: Left Yes   Right Yes    Hand grasp:   Left  Present      Right  Present  Plantar flexion: Left  Present      Right   Present    PHYSICAL EXAM  General: No apparent distress, comfortable  HEENT: Trachea midline, no masses, Pupils equal round  Chest: Respiratory effort was normal with no retractions or use of accessory muscles. Cardiovascular: Extremities warm, well perfused, RRR  Abdomen:  Soft and non distended. No tenderness, guarding, rebound, or rigidity. Extremities: Moves all 4 extremeties, No pedal edema. 4/5 LUE flexion and hand grasp    Spine:   Spine Tenderness ROM   Cervical 6/10 Normal   Thoracic 6/10 Normal   Lumbar 0/10 Normal     Musculoskeletal:    Joint Tenderness Swelling ROM   Right shoulder Absent absent normal   Left shoulder absent absent normal   Right elbow absent absent normal   Left elbow absent absent normal   Right wrist absent absent normal   Left wrist absent absent normal   Right hand grasp Absent absent normal   Left hand grasp absent absent normal   Right hip absent absent normal   Left hip absent absent normal   Right knee Absent absent normal   Left knee absent absent normal   Right ankle absent absent normal   Left ankle absent absent normal   Right foot Absent absent normal   Left foot absent absent normal       CONSULTS: none    PROCEDURES: none    INJURIES:      Active Problems:    Trauma  Resolved Problems:    * No resolved hospital problems.  *        Assessment/Plan:       · Neuro:  GCS 15, no acute issues  · CV: HR near normal limits, no acute issues  · Pulm: tolerating room air   · GI: tolerating general diet  · Renal: no acute issues  · ID: afebrile, no acute issues    · Endocrine: no acute issues  · MSK:  TTP in cervical and thoracic spine, plan for MRI C,T, L spine  · Heme: no acute issues     Bowel regime: glycolax  Pain control/Sedation: tylenol, robaxin, neurontin  DVT prophylaxis: SCD  GI: diet  Glucose protocol: monitor glucose  Mouth/Eye care: per patient  Amezcua: none    Code status:    Full Code    Patient/Family update:  As available     Disposition:  Pending imaging      Electronically signed by Yared Olmos MD on 2/7/22 at 6:09 AM EST

## 2022-02-07 NOTE — DISCHARGE SUMMARY
Physician Discharge Summary     Patient ID:  Caitlin Hilton  [id number]  38 y.o.  1980    Admit date: 2/6/2022    Discharge date and time: No discharge date for patient encounter. Admitting Physician: Sarah Faith MD     Admission Diagnoses: Trauma [T14.90XA]  Closed head injury with concussion, with loss of consciousness, initial encounter [S06.0X9A]    Discharge Diagnoses: Active Problems:    Trauma    Left-sided weakness    Closed head injury with concussion  Resolved Problems:    * No resolved hospital problems. *      Admission Condition: stable    Discharged Condition: stable    Indication for Admission: closed head injury with LOC    Hospital Course/Procedures/Operation/treatments:   2/6: Transfer from [facility], mechanical fall from [facility] with head injury and LOC, abrasions or R forehead and eyebrow, CT scans neg  2/7: Continues to endorse shooting and burning pain in the LUE and LLE. Tert unchanged. 2/8: Doing well, pain controlled, C-collar received, PT/OT 19/24, f/u outpatient with neurosurgery, medically stable for discharge          Consults:   IP CONSULT TO TRAUMA SURGERY  IP CONSULT TO NEUROSURGERY  INPATIENT CONSULT TO ORTHOTIST/PROSTHETIST    Significant Diagnostic Studies:   CT HEAD WO CONTRAST    Result Date: 2/6/2022  EXAMINATION: CT OF THE HEAD WITHOUT CONTRAST  2/6/2022 2:34 pm TECHNIQUE: CT of the head was performed without the administration of intravenous contrast. Dose modulation, iterative reconstruction, and/or weight based adjustment of the mA/kV was utilized to reduce the radiation dose to as low as reasonably achievable. COMPARISON: None. HISTORY: ORDERING SYSTEM PROVIDED HISTORY: Trauma TECHNOLOGIST PROVIDED HISTORY: Has a \"code stroke\" or \"stroke alert\" been called? ->No Reason for exam:->Trauma Decision Support Exception - unselect if not a suspected or confirmed emergency medical condition->Emergency Medical Condition (MA) FINDINGS: BRAIN/VENTRICLES: There is no acute intracranial hemorrhage, mass effect or midline shift. No abnormal extra-axial fluid collection. The gray-white differentiation is maintained without evidence of an acute infarct. There is no evidence of hydrocephalus. ORBITS: The visualized portion of the orbits demonstrate no acute abnormality. SINUSES: The visualized paranasal sinuses and mastoid air cells demonstrate no acute abnormality. There is a mucous retention cyst seen within the right maxillary sinus which measures 1.8 x 1.7 cm. SOFT TISSUES/SKULL:  No acute abnormality of the visualized skull or soft tissues. No acute intracranial abnormality. Specifically, there is no acute intracranial hemorrhage. CT CERVICAL SPINE WO CONTRAST    Result Date: 2/6/2022  EXAMINATION: CT OF THE CERVICAL SPINE WITHOUT CONTRAST 2/6/2022 2:34 pm TECHNIQUE: CT of the cervical spine was performed without the administration of intravenous contrast. Multiplanar reformatted images are provided for review. Dose modulation, iterative reconstruction, and/or weight based adjustment of the mA/kV was utilized to reduce the radiation dose to as low as reasonably achievable. COMPARISON: None. HISTORY: ORDERING SYSTEM PROVIDED HISTORY: pain, fall TECHNOLOGIST PROVIDED HISTORY: Reason for exam:->pain, fall Decision Support Exception - unselect if not a suspected or confirmed emergency medical condition->Emergency Medical Condition (MA) FINDINGS: The ring of C1 is intact as is the dense. There is no compression fracture of the cervical spine. No jumped or perched facet is noted. Multilevel degenerative disc and degenerative joint disease is noted. The degenerative disc disease is most pronounced at the C6-7 level. The prevertebral soft tissues are unremarkable. The airway is widely patent. Images through the lung apices are negative for a pneumothorax. 1. There is no acute compression fracture or subluxation of the cervical spine.  2. Mild multilevel degenerative disc and degenerative joint disease most prominent at the C6-7 level. CT THORACIC SPINE WO CONTRAST    Result Date: 2/6/2022  EXAMINATION: CT OF THE THORACIC SPINE WITHOUT CONTRAST  2/6/2022 2:34 pm: TECHNIQUE: CT of the thoracic spine was performed without the administration of intravenous contrast. Multiplanar reformatted images are provided for review. Dose modulation, iterative reconstruction, and/or weight based adjustment of the mA/kV was utilized to reduce the radiation dose to as low as reasonably achievable. COMPARISON: None. HISTORY: ORDERING SYSTEM PROVIDED HISTORY: Pain TECHNOLOGIST PROVIDED HISTORY: Reason for exam:->Pain FINDINGS: BONES/ALIGNMENT: There is normal alignment of the spine. The vertebral body heights are maintained. No osseous destructive lesion is seen. DEGENERATIVE CHANGES: No gross spinal canal stenosis or bony neural foraminal narrowing of the thoracic spine. SOFT TISSUES: No paraspinal mass is seen. No acute fractures or dislocations in the thoracic spine     CT LUMBAR SPINE WO CONTRAST    Result Date: 2/6/2022  EXAMINATION: CT OF THE LUMBAR SPINE WITHOUT CONTRAST  2/6/2022 TECHNIQUE: CT of the lumbar spine was performed without the administration of intravenous contrast. Multiplanar reformatted images are provided for review. Adjustment of mA and/or kV according to patient size was utilized. Dose modulation, iterative reconstruction, and/or weight based adjustment of the mA/kV was utilized to reduce the radiation dose to as low as reasonably achievable. COMPARISON: None HISTORY: ORDERING SYSTEM PROVIDED HISTORY: pain, fa;ll TECHNOLOGIST PROVIDED HISTORY: Reason for exam:->pain, fa;ll Decision Support Exception - unselect if not a suspected or confirmed emergency medical condition->Emergency Medical Condition (MA) FINDINGS: BONES/ALIGNMENT: No traumatic malalignment or acute fracture is identified. Spinal curvature suggestive of scoliosis.  DEGENERATIVE CHANGES: Up to moderate degree disc disease most conspicuous at right L4-5, facet DJD at mid lower levels. SOFT TISSUES/RETROPERITONEUM: No paraspinal mass is seen. No acute fracture is identified. CT ABDOMEN PELVIS W IV CONTRAST Additional Contrast? None    Result Date: 2/6/2022  EXAMINATION: CT OF THE ABDOMEN AND PELVIS WITH CONTRAST 2/6/2022 2:34 pm TECHNIQUE: CT of the abdomen and pelvis was performed with the administration of intravenous contrast. Multiplanar reformatted images are provided for review. Dose modulation, iterative reconstruction, and/or weight based adjustment of the mA/kV was utilized to reduce the radiation dose to as low as reasonably achievable. COMPARISON: None HISTORY: ORDERING SYSTEM PROVIDED HISTORY: pain, fall TECHNOLOGIST PROVIDED HISTORY: Reason for exam:->pain, fall Additional Contrast?->None Decision Support Exception - unselect if not a suspected or confirmed emergency medical condition->Emergency Medical Condition (MA) FINDINGS: Lower Chest:  No infiltrate or effusion in the visible lower chest. Organs: No acute abnormality. Beam hardening artifacts limit evaluation. GI/Bowel: Stool prominence may indicate constipation. No obstruction. Pelvis: No acute abnormality identified some venous prominence is commonly physiologic. Peritoneum/Retroperitoneum: No significant appearing ascites. Bones/Soft Tissues: Mild spinal degenerative changes. However scoliosis is present and DDD is focally moderate to severe right L4-L5 level. No specific acute abnormality is identified. CTA PULMONARY W CONTRAST    Result Date: 2/6/2022  EXAMINATION: CTA OF THE CHEST 2/6/2022 2:34 pm TECHNIQUE: CTA of the chest was performed after the administration of intravenous contrast.  Multiplanar reformatted images are provided for review. MIP images are provided for review.  Dose modulation, iterative reconstruction, and/or weight based adjustment of the mA/kV was utilized to reduce the radiation dose to as low as reasonably achievable. COMPARISON: None. HISTORY: ORDERING SYSTEM PROVIDED HISTORY: syncope TECHNOLOGIST PROVIDED HISTORY: Reason for exam:->syncope Decision Support Exception - unselect if not a suspected or confirmed emergency medical condition->Emergency Medical Condition (MA) FINDINGS: There is no indication for acute pulmonary embolus in the main PA, right left main PAs in the lobar, segmental and subsegmental branches to the 3/4 order approximately. The contrast density was target to evaluate the pulmonary artery circulation and not the thoracic aorta but there is no indication for dissection of the thoracic aorta. The diameter for the ascending aorta is 3 cm. There is no aneurysm formation. The heart is normal size. There is no pericardial effusion. No mediastinal mass or adenopathy seen. Fat planes of the mediastinum are preserved. Lungs are normally expanded. There is no pulmonary parenchymal opacities. There is no pleural effusions. There is no pneumothorax, pneumomediastinum subcutaneous emphysema. Delete Visualized upper abdominal structures are not fully covered. What is visualized appear unremarkable. There is no acute displaced fractures identified the in the areas of the shoulders, some degenerative changes are seen in the humeral tuberosity bilaterally. The patient had previous orthopedic procedure with reimplantation of tendon in the right humeral head. Collateral venous circulation is seen in the right upper extremity to an area of stenosis of the right subclavian vein as it enters the thoracic inlet. No acute displaced fractures are seen in the right or in the left rib cages. The there is no acute para in the sternum. There are degenerative changes in the thoracic spine and more noticeable in the cervical spine segment at C6-7. No acute fractures are seen in the thoracic spine. 1.  No acute pulmonary emboli. 2.  No signs for acute pulmonary process or pulmonary contusion.   No pneumothorax or subcutaneous emphysema or pneumomediastinum. 3.  No conspicuous acute displaced fractures identified in the bone structures of the right ribcage. 4.  The postoperative changes after implantation of tendons in the humeral head greater tuberosity area. 5.  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 [facility] Discharge Exam:  PSYCH: mood and affect normal, alert and oriented x 3  CONSTITUTIONAL: No apparent distress, comfortable  EYES: Sclera white, pupils equal round and reactive to light  ENMT:  Hearing normal, trachea midline, ears externally intact  RESP: Breath sounds were clear and equal with no rales, wheezes, or rhonchi. Respiratory effort was normal with no retractions or use of accessory muscles. CV: Heart sounds were normal with a regular rate and rhythm. No pedal edema  GI/ Abdomen: The abdomen was soft and non distended. There was no tenderness, guarding, rebound, or rigidity. NEURO: Strength 5/5 in all extremities    Disposition: home    In process/preliminary results:  Outstanding Order Results     Date and Time Order Name Status Description    2/8/2022  6:24 AM Basic Metabolic Panel w/ Reflex to MG In process           Patient Instructions:   Current Discharge Medication List           Details   cyclobenzaprine (FLEXERIL) 10 MG tablet Take 1 tablet by mouth 3 times daily as needed for Muscle spasms  Qty: 10 tablet, Refills: 0      naproxen (NAPROSYN) 500 MG tablet Take 1 tablet by mouth 2 times daily as needed for Pain (take with food and full glass of water.)  Qty: 14 tablet, Refills: 0      amphetamine-dextroamphetamine (ADDERALL) 20 MG tablet Take 20 mg by mouth Daily with lunch. ARIPiprazole (ABILIFY) 5 MG tablet Take 5 mg by mouth nightly       lamoTRIgine (LAMICTAL) 200 MG tablet Take 200 mg by mouth nightly       VYVANSE 70 MG capsule Take 70 mg by mouth every morning.        sertraline (ZOLOFT) 50 MG tablet Take 50 mg by mouth nightly Multiple Vitamins-Minerals (THERAPEUTIC MULTIVITAMIN-MINERALS) tablet Take 1 tablet by mouth daily             TRAUMA SERVICES DISCHARGE INSTRUCTIONS     Call [phone], option 2, for any questions/concerns and for follow-up appointment in 2 week(s).     Please follow the instructions checked below:     ACTIVITY INSTRUCTIONS  Increase activity as tolerated  No heavy lifting or strenuous activity  Take your incentive spirometer home and use 4-6 times/day     MEDICATION INSTRUCTIONS  Take medication as prescribed. When taking pain medications, you may experience dizziness or drowsiness. Do not drink alcohol or drive when taking these medications. You may experience constipation while taking pain medication. You may take over the counter stool softeners such as docusate (Colace), sennosides S (Senokot-S), or Miralax. []? You may take Ibuprofen (over the counter) as directed for mild pain. --You may take up to 800mg every 8 hours for pain, please take with food or milk. [x]? You may take acetaminophen (Tylenol) products. Do NOT take more than 4000mg of Tylenol in 24h. []? Do not take any other acetaminophen (Tylenol) products if you are taking Percocet or Norco, as these contain Tylenol. --Do NOT take more than 4000mg of Tylenol in 24h.     OPIOID MEDICATION INSTRUCTIONS  Read the medication guide that is included with your prescription. Take your medication exactly as prescribed. Store medication away from children and in a safe place. Do NOT share your medication with others. Do NOT take medication unless it is prescribed for you. Do NOT drink alcohol while taking opioids (I.e., Norco, Percocet, Oxycodone, etc).   Discuss with the Trauma Clinic staff if the dose of medication you are taking does not control your pain and any side effects that you may be having.       CALL 911 OR YOUR LOCAL EMERGENCY SERVICE:  --If you take too much medication  --If you have trouble breathing or shortness of breath  --A child has taken this medication.     WORK:  You may not return to work until you receive follow-up with the [facility] or clearance by all consultants.     Call the trauma clinic for any of the following or for questions/concerns;  --fever over 101F  --redness, swelling, hardness or warmth at the wound site(s). --Unrelieved nausea/vomiting  --Foul smelling or cloudy drainage at the wound site(s)  --Unrelieved pain or increase in pain  --Increase in shortness of breath     Follow-up:  Trauma Clinic: [phone] option [address]  [place], [address] [place]    Follow up:   [address]  [facility] [phone]  Schedule an appointment as soon as possible for a visit in 2 weeks  For follow up    Carry DO Pepito Caruso [address] [place] [phone]    Schedule an appointment as soon as possible for a visit in 2 weeks  For follow up after discharge from hospital.      Mirza Crane MD  [address] [place] [address]  [phone]    Schedule an appointment as soon as possible for a visit in 2 weeks  C6-7 moderate cervical stenosis, cervical strain       Signed:  Camilla Scott MD  2/8/2022  5:51 AM

## 2022-02-07 NOTE — PROGRESS NOTES
MRI screening completed with patient. 2 screws in knee from previous surgery.  2 piercing's in left eye brow - 1 in right ear - patient stated that she had previous MRI and has never taken these piercing's out

## 2022-02-07 NOTE — PROGRESS NOTES
Physical Therapy  Physical Therapy Initial Evaluation    Name: Puja Aguirre  : 1980  MRN: [id number]      Date of Service: 2022    Evaluating PT:  Ariel Frey, PT [id number]      Room #:  5402/5402-B  Diagnosis:  Trauma [T14.90XA]  Closed head injury with concussion, with loss of consciousness, initial encounter [S06.0X9A]  PMHx/PSHx:     has a past medical history of ADHD, Depression, PONV (postoperative nausea and vomiting), and Tear of left meniscus as current injury. has a past surgical history that includes Anterior cruciate ligament repair (Left, ); Dilation and curettage of uterus (); Carpal tunnel release (Right, 2015); Rotator cuff repair (6/10/15); Rotator cuff repair (2015); other surgical history (Right, 11/18/15); and Anterior cruciate ligament repair (Left, 2020). Procedure/Surgery:  None  Precautions:  Falls,  FWB (full weight bearing)  Cervical spinal precautions  Equipment Needs: None,    SUBJECTIVE:    Patient lives with family  in a two story home bedroom and bathroom 2nd floor full flight stairs with Rail  with 1 step to enter with Rail  Bed is on 1 floor and bath is on 1 floor. Patient ambulated independently  PTA. Equipment owned: None,      OBJECTIVE:   Initial Evaluation  Date: 22 Treatment Short Term/ Long Term   Goals   AM-PAC 6 Clicks 94/75     Was pt agreeable to Eval/treatment? yes     Does pt have pain? Yes L SI area      Bed Mobility  Rolling: Ind   Supine to sit:   Sup   Sit to supine: Sup   Scooting: Sup   Rolling: Ind  Supine to sit: Ind  Sit to supine: Ind  Scooting: Ind   Transfers Sit to stand: CGA no device unsteady. Stating dizziness. Stand to sit: CGA  Stand pivot: CGA  Sit to stand: Ind   Stand to sit: Ind   Stand pivot:  Ind    Ambulation    100 feet with HHA CGA   With fww SBA  200+ feet with Ind    Stair negotiation: ascended and descended  NT  10 steps with Ind    ROM BUE:  wfl   BLE:  wfl     Strength BUE: wfl   RLE:  Grossly 4/5  LLE:  Grossly  4/5  4+/5   Balance Sitting EOB:  Ind  Dynamic Standing:  CGA  Sitting EOB:  Ind  Dynamic Standing:  Ind     Patient is Alert & Oriented x person, place, time and situation and follows directions   Sensation:  Pt denies numbness and tingling to extremities  Edema:  none  Therapeutic Exercises:  Functional activity     Patient education  Pt educated regarding role of PT evaluation and need for OOB activity    Patient response to education:   Pt verbalized understanding Pt demonstrated skill Pt requires further education in this area   yes yes Reminders     ASSESSMENT:    Conditions Requiring Skilled Therapeutic Intervention:    [x]Decreased strength     [x]Decreased ROM  [x]Decreased functional mobility  [x]Decreased balance   [x]Decreased endurance   [x]Decreased posture  []Decreased sensation  []Decreased coordination   []Decreased vision  [x]Decreased safety awareness   []Increased pain       Comments:    RN cleared patient for participation in therapy session. Patient was seen this date for PT evaluation. Patient was agreeable to intervention. Results of the functional assessment are noted above. Upon entering the room patient was found supine in bed. Sat EOB x 5 minutes to increase dynamic sitting balance and activity tolerance. Stating dizziness with gait and feeling of being unsteady. Cued to widen PALOMO. At end of session, patient in bed with call light and phone within reach,  all lines and tubes intact, nursing notified. This patient can benefit from the continuation of skilled PT  to maximize functional level and return to PLOF. Treatment:  Patient practiced and was instructed in the following treatment:    Pt's/ family goals   1. Home    Prognosis is good  for reaching above PT goals.     Patient and or family understand(s) diagnosis, prognosis, and plan of care.  yes,     PHYSICAL THERAPY PLAN OF CARE:    PT POC is established based on physician order and patient diagnosis Referring provider/PT Order:  PT Eval and Treat   02/06/22 9635  PT evaluation and treat        Melanie Funez MD       Diagnosis:  Trauma [T14.90XA]  Closed head injury with concussion, with loss of consciousness, initial encounter [S06.0X9A]  Specific instructions for next treatment:  Increase ambulation distance  Current Treatment Recommendations:     [x] Strengthening to improve independence with functional mobility   [x] ROM to improve independence with functional mobility   [x] Balance Training to improve static/dynamic balance and to reduce fall risk  [x] Endurance Training to improve activity tolerance during functional mobility   [x] Transfer Training to improve safety and independence with all functional transfers   [x] Gait Training to improve gait mechanics, endurance and asses need for appropriate assistive device  [x] Stair Training in preparation for safe discharge home and/or into the community   [] Positioning to prevent skin breakdown and contractures  [x] Safety and Education Training   [] Patient/Caregiver Education   [] HEP  [] Other     PT long term treatment goals are located in above grid    Frequency of treatments: 2-5x/week x 1-2 weeks. Time in  1255  Time out  1320    Total Treatment Time  25 minutes     Evaluation Time includes thorough review of current medical information, gathering information on past medical history/social history and prior level of function, completion of standardized testing/informal observation of tasks, assessment of data and education on plan of care and goals.     CPT codes:  [x] Low Complexity PT evaluation 38870  [] Moderate Complexity PT evaluation 35652  [] High Complexity PT evaluation 01048  [] PT Re-evaluation 42484  [] Gait training 11722 - minutes  [] Manual therapy 77089 - minutes  [x] Therapeutic activities 52509 15 minutes  [] Therapeutic exercises 01020 - minutes  [] Neuromuscular reeducation 14247 - minutes     Yoni Almaraz, PT [id number]

## 2022-02-07 NOTE — ED NOTES
Patient SBAR faxed, patient floor called.  Patient placed in transport      [facility]  02/06/22 5210

## 2022-02-07 NOTE — CARE COORDINATION
Met with the pt at the bedside to discuss transition of care. The pt lives with her children and their aunt and uncle. She will return home when medically stable. No cm needs identified.  Tonia Pascal RN

## 2022-02-07 NOTE — PROGRESS NOTES
[facility]  ATTENDING PHYSICIAN PROGRESS NOTE     I have examined the patient and  reviewed the record. I have reviewed all relevant labs and imaging data. The following summarizes my clinical findings and independent assessment. CC: Fall on ice    S. Patient complains of left-sided weakness. This happened back in 2018 after a syncopal fall. At that time all the scans were negative. Time, she underwent CT pan scan of the head, C-spine, chest abdomen pelvis and thoracic and lumbar spines. All these were negative. O.  PHYSICAL EXAM   PSYCH: mood and affect normal, alert and oriented x 3  CONSTITUTIONAL: No apparent distress, comfortable  EYES: Sclera white, pupils equal round and reactive to light  ENMT:  Hearing normal, trachea midline, ears externally intact  RESP: Breath sounds were clear and equal with no rales, wheezes, or rhonchi. Respiratory effort was normal with no retractions or use of accessory muscles. CV: Heart sounds were normal with a regular rate and rhythm. No pedal edema  GI/ Abdomen: The abdomen was soft and non distended. There was no tenderness, guarding, rebound, or rigidity. NEURO: Left upper and left lower extremity-4 out of 5 strength      ASSESSMENT:  Active Problems:    Trauma  Resolved Problems:    * No resolved hospital problems. *       PLAN:  Left-sided weakness after a fall-this happened back in 2018. I have reviewed all the CT scans of the head, C-spine, chest abdomen pelvis. We will continue [place] collar for now. Check MRI of CTL spine    Diet as tolerated    DVT Proph: SCDS/ laura Valles MD, FACS  2/7/2022  10:54 AM    NOTE: This report was transcribed using voice recognition software. Every effort was made to ensure accuracy; however, inadvertent computerized transcription errors may be present.

## 2022-02-07 NOTE — PROGRESS NOTES
PCP is Opal Keith DO  Office notified of admission.       Electronically signed by Rik Box RN MSN APRN-NP [facility] NP  [facility]NS CCRN 2/7/2022 11:19 AM

## 2022-02-07 NOTE — ED PROVIDER NOTES
ED PROVIDER NOTE    Chief Complaint   Patient presents with    Trauma     Fall, trauma transfer from [place]       HPI:  2/6/22,   Time: 9:52 PM NEGIN Aguirre is a 38 y.o. female presenting to the ED for trauma evaluation. Patient was seen at [facility] ED and transferred here for trauma consultation. Patient reports she fell today around 1200, was walking outside, slipped on the ice, flew up in the air, landed on her low back and then the back of her head. No LOC at that time. When she sat up she became very dizzy and the next thing she remembers is waking up in her car with the engine running and 2 hours had passed since the fall. Does not remember anything in between. Family who spoke to her on the phone after she woke up in the car reported that she was very confused. Since the injury having posterior headache and left sided neck and left low back pain, constant, throbbing, moderate in severity, worse w/ movement. Low back pain radiates to LLE. Associated weakness and paresthesias in LUE and LLE. Was seen at [facility] ED where pan CT was negative for acute traumatic injury. On reevaluation still had midline C-spine ttp. Patient was accepted by trauma surgery to be transferred to our ED for further evaluation. No drug/etoh use. Not on anticoagulants. Chart review: reviewed pan CT at [facility] ED, negative for acute traumatic injury    Review of Systems:     Review of Systems   Constitutional: Positive for fatigue. Negative for diaphoresis. HENT: Negative for ear pain, facial swelling, nosebleeds and rhinorrhea. Eyes: Negative for pain and visual disturbance. Respiratory: Negative for shortness of breath, wheezing and stridor. Cardiovascular: Negative for chest pain. Gastrointestinal: Negative for abdominal pain, nausea and vomiting. Genitourinary: Negative for flank pain and pelvic pain. Musculoskeletal: Positive for back pain and neck pain.  Negative for arthralgias, joint swelling, myalgias and neck stiffness. Skin: Negative for color change and wound. Neurological: Positive for weakness, numbness and headaches. Negative for dizziness, syncope and light-headedness.    Hematological: Does not bruise/bleed easily.         --------------------------------------------- PAST HISTORY ---------------------------------------------  Past Medical History:   Past Medical History:   Diagnosis Date    ADHD     Depression     controlled with med, per patient    PONV (postoperative nausea and vomiting)     Tear of left meniscus as current injury        Past Surgical History:   Past Surgical History:   Procedure Laterality Date    ANTERIOR CRUCIATE LIGAMENT REPAIR Left 1998    ANTERIOR CRUCIATE LIGAMENT REPAIR Left 7/23/2020    LEFT KNEE ARTHROSCOPY MEDIAL MENISCUS ROOT REPAIR POSSIBLE REVISION ACL RECONSTRUCTION WITH ALLOGRAFT performed by [facility]adeo [facility]co [facility] at [facility] Right 4/2015    DILATION AND CURETTAGE OF UTERUS  2012    OTHER SURGICAL HISTORY Right 11/18/15    RIGHT SHOULDER MANIPULATION & ARTHROSCOPY    ROTATOR CUFF REPAIR  6/10/15    right rotator cuff open revision    ROTATOR CUFF REPAIR  4/2015       Social History:   Social History     Socioeconomic History    Marital status: Single     Spouse name: Not on file    Number of children: Not on file    Years of education: Not on file    Highest education level: Not on file   Occupational History    Not on file   Tobacco Use    Smoking status: Current Every Day Smoker     Packs/day: 0.50     Years: 5.00     Pack years: 2.50     Types: Cigarettes    Smokeless tobacco: Never Used   Vaping Use    Vaping Use: Never used   Substance and Sexual Activity    Alcohol use: No    Drug use: No    Sexual activity: Not on file   Other Topics Concern    Not on file   Social History Narrative    Not on file     Social Determinants of Health     Financial Resource Strain:     Difficulty of Paying Living Expenses: Not on file   Food Insecurity:     Worried About [facility] in the Last Year: Not on file    Angy of Food in the Last Year: Not on file   Transportation Needs:     Lack of Transportation (Medical): Not on file    Lack of Transportation (Non-Medical): Not on file   Physical Activity:     Days of Exercise per Week: Not on file    Minutes of Exercise per Session: Not on file   Stress:     Feeling of Stress : Not on file   Social Connections:     Frequency of Communication with Friends and Family: Not on file    Frequency of Social Gatherings with Friends and Family: Not on file    Attends Sabianist Services: Not on file    Active Member of [address] [place] [address] [employer] or Organizations: Not on file    Attends Club or Organization Meetings: Not on file    Marital Status: Not on file   Intimate Partner Violence:     Fear of Current or Ex-Partner: Not on file    Emotionally Abused: Not on file    Physically Abused: Not on file    Sexually Abused: Not on file   Housing Stability:     Unable to Pay for Housing in the Last Year: Not on file    Number of [place] in the Last Year: Not on file    Unstable Housing in the Last Year: Not on file       Family History:   No family history on file. The patients home medications have been reviewed. Allergies:   No Known Allergies        ---------------------------------------------------PHYSICAL EXAM--------------------------------------    /85   Pulse 80   Temp 97.5 °F (36.4 °C)   Resp 16   SpO2 98%     Physical Exam  Constitutional:       General: She is not in acute distress. Appearance: She is not toxic-appearing. HENT:      Head:      Comments: Right forehead abrasions     Mouth/Throat:      Mouth: Mucous membranes are moist.   Eyes:      General: No scleral icterus. Extraocular Movements: Extraocular movements intact. Pupils: Pupils are equal, round, and reactive to light.    Neck:      Comments: c-collar in place. +midline and left paraspinal ttp. Cardiovascular:      Rate and Rhythm: Normal rate and regular rhythm. Pulses: Normal pulses. Heart sounds: Normal heart sounds. No murmur heard. Pulmonary:      Effort: Pulmonary effort is normal. No respiratory distress. Breath sounds: Normal breath sounds. No wheezing or rales. Chest:      Chest wall: No tenderness. Abdominal:      General: There is no distension. Palpations: Abdomen is soft. Tenderness: There is no abdominal tenderness. Musculoskeletal:         General: No swelling or deformity. Normal range of motion. Cervical back: Normal range of motion and neck supple. Comments: Radial, DP, and PT pulses 2+ bilaterally. No stepoff/deformity. Lumbar spine no midline ttp/stepoff/deformity   Skin:     General: Skin is warm and dry. Neurological:      Mental Status: She is alert and oriented to person, place, and time. Comments: RUE/RLE 5/5 strength and SILT throughout. LUE/LLE 4/5 strength and SILT throughout but diminished compared to RUE/RLE            -------------------------------------------------- RESULTS -------------------------------------------------  I have personally reviewed all laboratory and imaging results for this patient. Results are listed below. LABS:  Labs Reviewed   URINE DRUG SCREEN   BASIC METABOLIC PANEL W/ REFLEX TO MG FOR LOW K   CBC   HCG, SERUM, QUALITATIVE       RADIOLOGY:  Interpreted personally and by Radiologist.   [facility]    (Results Pending)   MRI THORACIC SPINE WO CONTRAST    (Results Pending)   MRI LUMBAR SPINE [facility]    (Results Pending)           ------------------------- NURSING NOTES AND VITALS REVIEWED ---------------------------   The nursing notes within the ED encounter and vital signs as below have been reviewed by myself.   /85   Pulse 80   Temp 97.5 °F (36.4 °C)   Resp 16   SpO2 98%   Oxygen Saturation Interpretation: Normal    The patients available past medical records and past encounters were reviewed. ------------------------------ ED COURSE/MEDICAL DECISION MAKING----------------------  Medications   sodium chloride flush 0.9 % injection 10 mL (has no administration in time range)   sodium chloride flush 0.9 % injection 10 mL (has no administration in time range)   0.9 % sodium chloride infusion (has no administration in time range)   ondansetron (ZOFRAN-ODT) disintegrating tablet 4 mg (has no administration in time range)     Or   ondansetron (ZOFRAN) injection 4 mg (has no administration in time range)   polyethylene glycol (GLYCOLAX) packet 17 g (has no administration in time range)   0.9 % sodium chloride infusion (has no administration in time range)   acetaminophen (TYLENOL) tablet 650 mg (has no administration in time range)   methocarbamol (ROBAXIN) tablet 1,000 mg (has no administration in time range)   gabapentin (NEURONTIN) capsule 100 mg (has no administration in time range)   fentaNYL (SUBLIMAZE) injection 50 mcg (50 mcg IntraVENous Given 22)       Consultations:             Trauma surgery    Counseling: The emergency provider has spoken with the patient and discussed todays results, in addition to providing specific details for the plan of care and counseling regarding the diagnosis and prognosis. Questions are answered at this time and they are agreeable with the plan. ED Course/Medical Decision Makin y.o. female here for trauma evaluation s/p mechanical ground level fall earlier today. Non-toxic appearing, afebrile, hemodynamically stable, and in no acute distress. Breathing comfortably on room air without respiratory distress. Pan CT at [facility] ED negative for acute traumatic injury. Trauma surgery consulted, will admit patient for further management and MRI to rule out spinal cord injury. --------------------------------- IMPRESSION AND DISPOSITION ---------------------------------    IMPRESSION  1.  Closed head injury with concussion, with loss of consciousness, initial encounter        DISPOSITION  Disposition: Admit to med/surg floor  Patient condition is stable    NOTE: This report was transcribed using voice recognition software.  Every effort was made to ensure accuracy; however, inadvertent computerized transcription errors may be present    Zuly Aguayo MD  Attending Emergency Physician         Zuly Aguayo MD  02/07/22 [id number]

## 2022-02-07 NOTE — PROGRESS NOTES
[address]  [address] [place] [address], [address] [place] [address]      [id number]                 Patient Name: Omaira Campo  MRN: [id number]  : 1980  Room: [address] [place] [address]    Referring Provider: Pravin Jaquez MD  Specific Provider Orders/Date: OT evaluation and treat 22    Evaluating OT: Kim Desouza, OTR/L #[id number]    Diagnosis: Trauma [T14.90XA]  Closed head injury with concussion, with loss of consciousness, initial encounter [S06.0X9A]      Surgery:   Past Surgical History:   Procedure Laterality Date    ANTERIOR CRUCIATE LIGAMENT REPAIR Left     ANTERIOR CRUCIATE LIGAMENT REPAIR Left 2020    LEFT KNEE ARTHROSCOPY MEDIAL MENISCUS ROOT REPAIR POSSIBLE REVISION ACL RECONSTRUCTION WITH ALLOGRAFT performed [facility] Aida Maravilla [facility] at [facility] Right 2015    DILATION AND CURETTAGE OF UTERUS      OTHER SURGICAL HISTORY Right 11/18/15    RIGHT SHOULDER MANIPULATION & ARTHROSCOPY    ROTATOR CUFF REPAIR  6/10/15    right rotator cuff open revision    ROTATOR CUFF REPAIR  2015          Pertinent Medical History:  has a past medical history of ADHD, Depression, PONV (postoperative nausea and vomiting), and Tear of left meniscus as current injury.      Precautions:  Fall Risk, concussion, C collar C spine precautions    Assessment of current deficits   [x] Functional mobility  [x]ADLs  [] Strength               []Cognition   [x] Functional transfers   [x] IADLs         [x] Safety Awareness   [x]Endurance   [] Fine Coordination              [x] Balance      [x] Vision/perception   []Sensation    []Gross Motor Coordination  [] ROM  [] Delirium                   [] Motor Control     OT PLAN OF CARE   OT POC based on physician orders, patient diagnosis and results of clinical assessment    Frequency/Duration   1-3 days/wk for 1 week PRN   Specific OT Treatment Interventions to include:   * Instruction/training on adapted ADL techniques and AE recommendations to increase functional independence within precautions       * Training on energy conservation strategies, correct breathing pattern and techniques to improve independence/tolerance for self-care routine  * Functional transfer/mobility training/DME recommendations for increased independence, safety, and fall prevention  * Patient/Family education to increase follow through with safety techniques and functional independence  * Recommendation of environmental modifications for increased safety with functional transfers/mobility and ADLs  * Therapeutic activities to facilitate/challenge dynamic balance, stand tolerance for increased safety and independence with ADLs    Recommended Adaptive Equipment: TBD     Home Living: Pt lives with Aunt and Uncle and 3 children in a one story home with one steps and no hand rails. Bed and bath on second  floor with full flight of  steps and one HR's.   Bathroom setup: tub shower with seat    Equipment owned: shower seat    Prior Level of Function: Independent with ADLs , Independent with IADLs; ambulated no AD  Driving: yes  Occupation: starting new job - office  Medication management: self  Leisure: enjoys basketball coaching    Pain Level: 7/10 L flank pain increased with standing   Cognition: A&O: 4/4; Follows multi step directions   Memory:  3/3 STM recall   Sequencing:  Good-    Problem solving:  Good- limited by pain and dizziness   Judgement/safety:  Good-     Functional Assessment:  AM-PAC Daily Activity Raw Score: 19/24   Initial Eval Status  Date: 2/7/22 Treatment Status  Date: STGs = LTGs  Time frame: 1-3 days   Feeding Independent     Grooming SBA standing at sink  Mod I    UB Dressing SB A  Independent   LB Dressing CGA for socks and pants using figure 4 method  Mod I    Bathing Simulated SBA  Mod I seated   Toileting CGA to low toilet able to perform bladder hygiene  Mod I    Bed Mobility  Supine to sit: CGA log rolling  Sit to supine:  supervision  Supine to sit: mod I   Sit to supine: mod I    Functional Transfers CGA sit to stand with noted increased dizziness /89  Mod I    Functional Mobility Min A hand held and SBA with ww  Mod I    Balance Sitting:     Static:  good    Dynamic:SBA  Standing: CGA                                                                       Activity Tolerance Fair limited by back pain and dizziness  Good    Visual/  Perceptual           Noted fair ability to perform eye tracking with pain noted in R lower quadrant movements         Safety Good-                                  good     Hand Dominance R    AROM (PROM) Strength Additional Info:    RUE  WFL 4-/5 fair  and wfl FMC/dexterity noted during ADL tasks       LUE WFL 4-/5 Fair-  and wfl FMC/dexterity noted during ADL tasks     Hearing: WFL   Sensation:   No c/o numbness or tingling   Tone: WFL   Edema: none noted    Comments: Upon arrival patient supine and agreeable to evaluation. Performed OT evaluation with education on spinal neutral mechanics and ADL completion. At end of session, patient supine with HOB elevated and  with call light and phone within reach, all lines and tubes intact. Nursing notified. Overall patient demonstrated min  decreased independence and safety during completion of ADL/functional transfer/mobility tasks. Pt would benefit from continued skilled OT to increase safety and independence with completion of ADL/IADL tasks for functional independence and quality of life.     Treatment: OT treatment provided this date includes:    Instruction/training on safety and adapted techniques for completion of ADLs: to increase [place] in self care with AE/DME prn     Instruction/training on safe functional mobility/transfer techniques: with focus on safety, technique & precautions     Facilitation of Visual Perceptual Skills for increased safety and independence in ADL completion. ·  Instruction/training on energy conservation/work simplification for completion of ADLs: techniques to increase [place] with self care ADLs & iADLs, work     simplification to improve endurance  ·  Proper Positioning/Alignment: for optimal healing, skin integrity to prevent breakdown, decrease edema  · Skilled monitoring of vitals: to include BP, spO2 & HR during session  · Sitting/standing Balance/Tolerance- to increased balance & activity tolerance during ADLs as well as facilitate proper posture and/or positioning. · Therapeutic exercise- Instruction on B  UE ROM exercises to improve strength/function for increased [place] with ADLs & iADLs         Rehab Potential: Good  for established goals     Patient / Family Goal: home with assist as needed. Patient and/or family were instructed on functional diagnosis, prognosis/goals and OT plan of care. Demonstrated good- understanding. Eval Complexity: low    Time In: 12:58  Time Out: 1:23  Total Treatment Time: 10 min. Min Units   OT Eval Low 98875  X     OT Eval Medium 25652      OT Eval High [id number]       OT Re-Eval [id number]       Therapeutic Ex [id number]       Therapeutic Activities 30605  10  1   ADL/Self Care 53663       Orthotic Management 12020       Neuro Re-Ed 42534       Non-Billable Time          Evaluation Time includes thorough review of current medical information, gathering information on past medical history/social history and prior level of function, completion of standardized testing/informal observation of tasks, assessment of data and education on plan of care and goals. Che Santos.  Nidia 72, Armaan 70

## 2022-02-08 VITALS
BODY MASS INDEX: 24.8 KG/M2 | RESPIRATION RATE: 16 BRPM | WEIGHT: 140 LBS | TEMPERATURE: 98 F | HEART RATE: 74 BPM | DIASTOLIC BLOOD PRESSURE: 93 MMHG | OXYGEN SATURATION: 98 % | SYSTOLIC BLOOD PRESSURE: 121 MMHG | HEIGHT: 63 IN

## 2022-02-08 LAB
ANION GAP SERPL CALCULATED.3IONS-SCNC: 10 MMOL/L (ref 7–16)
BUN BLDV-MCNC: 18 MG/DL (ref 6–20)
CALCIUM SERPL-MCNC: 9.1 MG/DL (ref 8.6–10.2)
CHLORIDE BLD-SCNC: 102 MMOL/L (ref 98–107)
CO2: 25 MMOL/L (ref 22–29)
CREAT SERPL-MCNC: 0.8 MG/DL (ref 0.5–1)
GFR AFRICAN AMERICAN: >60
GFR NON-AFRICAN AMERICAN: >60 ML/MIN/1.73
GLUCOSE BLD-MCNC: 88 MG/DL (ref 74–99)
POTASSIUM REFLEX MAGNESIUM: 3.8 MMOL/L (ref 3.5–5)
SODIUM BLD-SCNC: 137 MMOL/L (ref 132–146)

## 2022-02-08 PROCEDURE — G0378 HOSPITAL OBSERVATION PER HR: HCPCS

## 2022-02-08 PROCEDURE — L0172 CERV COL SR FOAM 2PC PRE OTS: HCPCS

## 2022-02-08 PROCEDURE — 6370000000 HC RX 637 (ALT 250 FOR IP)

## 2022-02-08 PROCEDURE — 99231 SBSQ HOSP IP/OBS SF/LOW 25: CPT | Performed by: SURGERY

## 2022-02-08 PROCEDURE — 6360000002 HC RX W HCPCS: Performed by: SURGERY

## 2022-02-08 PROCEDURE — 96372 THER/PROPH/DIAG INJ SC/IM: CPT

## 2022-02-08 PROCEDURE — 80048 BASIC METABOLIC PNL TOTAL CA: CPT

## 2022-02-08 PROCEDURE — 36415 COLL VENOUS BLD VENIPUNCTURE: CPT

## 2022-02-08 RX ORDER — GABAPENTIN 100 MG/1
100 CAPSULE ORAL EVERY 8 HOURS
Qty: 42 CAPSULE | Refills: 0 | Status: SHIPPED | OUTPATIENT
Start: 2022-02-08 | End: 2022-08-17

## 2022-02-08 RX ADMIN — GABAPENTIN 100 MG: 100 CAPSULE ORAL at 08:00

## 2022-02-08 RX ADMIN — ACETAMINOPHEN 650 MG: 325 TABLET ORAL at 08:01

## 2022-02-08 RX ADMIN — METHOCARBAMOL 1000 MG: 500 TABLET ORAL at 07:59

## 2022-02-08 RX ADMIN — ENOXAPARIN SODIUM 30 MG: 100 INJECTION SUBCUTANEOUS at 08:00

## 2022-02-08 ASSESSMENT — PAIN SCALES - GENERAL
PAINLEVEL_OUTOF10: 0
PAINLEVEL_OUTOF10: 0
PAINLEVEL_OUTOF10: 4

## 2022-02-08 NOTE — PROGRESS NOTES
Discharge instructions presented and all questions answered. IV removed.  Patient family at bedside to transport home

## 2022-02-08 NOTE — PROGRESS NOTES
[facility]  ATTENDING PHYSICIAN PROGRESS NOTE     I have examined the patient and  reviewed the record. I have reviewed all relevant labs and imaging data. The following summarizes my clinical findings and independent assessment. CC: Fall on ice    S. Patient notes left siided weakness has improved. She underwent MRI C T L spine and was seen by arash    O.  PHYSICAL EXAM   PSYCH: mood and affect normal, alert and oriented x 3  CONSTITUTIONAL: No apparent distress, comfortable  EYES: Sclera white, pupils equal round and reactive to light  ENMT:  Hearing normal, trachea midline, ears externally intact, custom collar present  RESP: Breath sounds were clear and equal with no rales, wheezes, or rhonchi. Respiratory effort was normal with no retractions or use of accessory muscles. CV: Heart sounds were normal with a regular rate and rhythm. No pedal edema  GI/ Abdomen: The abdomen was soft and non distended. There was no tenderness, guarding, rebound, or rigidity. NEURO: Left upper and left lower extremity-4 out of 5 strength      ASSESSMENT:  Active Problems:    Trauma    Left-sided weakness    Closed head injury with concussion  Resolved Problems:    * No resolved hospital problems. *       PLAN:  Left-sided weakness after a fall-this happened back in 2018. Underwent MRI C T L spine yesterday  Seen by Dr Konnie Oppenheim yesterday  Custom Collar x 2 weeks  Follow-up in neurosurgery clinic in 2 weeks with cervical x-rays. MRI showed cervical stenosis    Diet as tolerated    DVT Proph: SCDS/ laura Chery MD, FACS  2/8/2022  9:20 AM    NOTE: This report was transcribed using voice recognition software. Every effort was made to ensure accuracy; however, inadvertent computerized transcription errors may be present.

## 2022-02-09 ENCOUNTER — TELEPHONE (OUTPATIENT)
Dept: SURGERY | Age: 42
End: 2022-02-09

## 2022-02-09 DIAGNOSIS — M48.02 CERVICAL STENOSIS OF SPINAL CANAL: Primary | ICD-10-CM

## 2022-02-17 NOTE — ED NOTES
Patient transferred from [address] [place] [address] by EMS for Trauma. Ccollar in place. Patient is alert with no needs. Patient requesting to go home and eat. No bleeding or deformities noted. Call bell in reach.       Ilya Capps RN  02/06/22 0337 Ulysses Beck MRN:[id number]  Last office visit: 12/20/21  Follow up: 2 months.   Next appointment: 2/21/22    Patient to continue the following medication (s): simvastatin (ZOCOR) 40 MG tablet      This refill has been completed per the [facility] Refill Policy

## 2022-02-22 ENCOUNTER — OFFICE VISIT (OUTPATIENT)
Dept: SURGERY | Age: 42
End: 2022-02-22
Payer: COMMERCIAL

## 2022-02-22 VITALS
SYSTOLIC BLOOD PRESSURE: 123 MMHG | WEIGHT: 155 LBS | OXYGEN SATURATION: 100 % | HEART RATE: 80 BPM | DIASTOLIC BLOOD PRESSURE: 86 MMHG | TEMPERATURE: 97.5 F | BODY MASS INDEX: 27.46 KG/M2 | HEIGHT: 63 IN | RESPIRATION RATE: 16 BRPM

## 2022-02-22 DIAGNOSIS — T14.90XA TRAUMA: ICD-10-CM

## 2022-02-22 DIAGNOSIS — S06.0X1D CONCUSSION WITH LOSS OF CONSCIOUSNESS OF 30 MINUTES OR LESS, SUBSEQUENT ENCOUNTER: Primary | ICD-10-CM

## 2022-02-22 PROCEDURE — G8427 DOCREV CUR MEDS BY ELIG CLIN: HCPCS | Performed by: NURSE PRACTITIONER

## 2022-02-22 PROCEDURE — 4004F PT TOBACCO SCREEN RCVD TLK: CPT | Performed by: NURSE PRACTITIONER

## 2022-02-22 PROCEDURE — 99212 OFFICE O/P EST SF 10 MIN: CPT | Performed by: NURSE PRACTITIONER

## 2022-02-22 PROCEDURE — G8419 CALC BMI OUT NRM PARAM NOF/U: HCPCS | Performed by: NURSE PRACTITIONER

## 2022-02-22 PROCEDURE — G8484 FLU IMMUNIZE NO ADMIN: HCPCS | Performed by: NURSE PRACTITIONER

## 2022-02-22 NOTE — PROGRESS NOTES
Trauma Clinic Progress Note   2/22/2022     Date of injury :2/6/22        LOUIS/Injuries:   Patient Active Problem List   Diagnosis Code    Adhesive capsulitis of right shoulder M75.01    Contusion of finger of left hand S60.00XA    Contusion of multiple sites of left shoulder and upper arm S40.012A, S40.022A    Laceration of left upper extremity S41.112A    Trauma T14.90XA    Left-sided weakness R53.1    Closed head injury with concussion S06. 0X9A       Surgeries:   Past Surgical History:   Procedure Laterality Date    ANTERIOR CRUCIATE LIGAMENT REPAIR Left 1998    ANTERIOR CRUCIATE LIGAMENT REPAIR Left 7/23/2020    LEFT KNEE ARTHROSCOPY MEDIAL MENISCUS ROOT REPAIR POSSIBLE REVISION ACL RECONSTRUCTION WITH ALLOGRAFT performed by Tati Paniagua [facility] at [facility] Right 4/2015    DILATION AND CURETTAGE OF UTERUS  2012    OTHER SURGICAL HISTORY Right 11/18/15    RIGHT SHOULDER MANIPULATION & ARTHROSCOPY    ROTATOR CUFF REPAIR  6/10/15    right rotator cuff open revision    ROTATOR CUFF REPAIR  4/2015       Vital signs:    /86   Pulse 80   Temp 97.5 °F (36.4 °C) (Infrared)   Resp 16   Ht 5' 3\" (1.6 m)   Wt 155 lb (70.3 kg)   SpO2 100%   BMI 27.46 kg/m²     Medications:    Prior to Admission medications    Medication Sig Start Date End Date Taking? Authorizing Provider   gabapentin (NEURONTIN) 100 MG capsule Take 1 capsule by mouth every 8 hours for 14 days.   Patient not taking: Reported on 2/22/2022 2/8/22 2/22/22  Ana Camp MD   cyclobenzaprine (FLEXERIL) 10 MG tablet Take 1 tablet by mouth 3 times daily as needed for Muscle spasms  Patient not taking: Reported on 2/22/2022 9/9/21   RODOLFO Hong CNP   naproxen (NAPROSYN) 500 MG tablet Take 1 tablet by mouth 2 times daily as needed for Pain (take with food and full glass of water.)  Patient not taking: Reported on 2/22/2022 9/9/21 9/16/21  RODOLFO Hong CNP   amphetamine-dextroamphetamine (ADDERALL) 20 MG tablet Take 20 mg by mouth Daily with lunch. Patient not taking: Reported on 2/22/2022 6/10/20   Historical Provider, MD   ARIPiprazole (ABILIFY) 5 MG tablet Take 5 mg by mouth nightly   Patient not taking: Reported on 2/22/2022 6/9/20   Historical Provider, MD   lamoTRIgine (LAMICTAL) 200 MG tablet Take 200 mg by mouth nightly   Patient not taking: Reported on 2/22/2022 6/9/20   Historical Provider, MD   VYVANSE 70 MG capsule Take 70 mg by mouth every morning. Patient not taking: Reported on 2/22/2022 5/18/20   Historical Provider, MD   sertraline (ZOLOFT) 50 MG tablet Take 50 mg by mouth nightly   Patient not taking: Reported on 2/22/2022    Historical Provider, MD   Multiple Vitamins-Minerals (THERAPEUTIC MULTIVITAMIN-MINERALS) tablet Take 1 tablet by mouth daily  Patient not taking: Reported on 2/22/2022    Historical Provider, MD          CC:  Trauma follow up    [address] [place] [address] Ln presents to trauma clinic for follow up of injury sustained from a mechanical fall. He was transferred from [facility] she had loss of consciousness with abrasions to her right forehead and eyebrow and shooting and burning pain of her left upper extremity and lower left extremity. She states that she is feeling much better. She does not have a headache. Does not have any cognitive impairments. Denies irritability. Denies nausea vomiting or visual disturbances. Eating well. Sleeping well. Takes acetaminophen or ibuprofen as needed for discomfort. Physical Exam  Physical Exam  Vitals and nursing note reviewed. Constitutional:       Appearance: Normal appearance. HENT:      Head: Normocephalic. Cardiovascular:      Rate and Rhythm: Normal rate and regular rhythm. Heart sounds: Normal heart sounds. Pulmonary:      Effort: Pulmonary effort is normal.      Breath sounds: Normal breath sounds. Musculoskeletal:         General: Normal range of motion. Skin:     General: Skin is warm and dry. Neurological:      General: No focal deficit present. Mental Status: She is alert and oriented to person, place, and time. Psychiatric:         Mood and Affect: Mood normal.         Behavior: Behavior normal.         Thought Content: Thought content normal.         Judgment: Judgment normal.           Controlled substances monitoring: not applicable. Education  Reinforced Importance of increasing activity. Instructed on concussion:  causes, definition, s&s, treatment, course of concussion. Instructed on mulitmodal analgesia. Encouraged decreasing use of opioid medications with use of NSAIDS and Acetaminophen. Assessment  Patient Active Problem List   Diagnosis Code    Adhesive capsulitis of right shoulder M75.01    Contusion of finger of left hand S60.00XA    Contusion of multiple sites of left shoulder and upper arm S40.012A, S40.022A    Laceration of left upper extremity S41.112A    Trauma T14.90XA    Left-sided weakness R53.1    Closed head injury with concussion S06. 0X9A       Plan  RTC as needed      Yury Mcconnell DNP, APRN- CNP  2/22/2022  5:22 PM

## 2022-02-22 NOTE — PATIENT INSTRUCTIONS
[facility]/Trauma Services  Additional Discharge Instructions    Call [phone] for any questions/concerns        Call for any of the following or for questions/concerns:   · Fever over 101° F    · Redness, swelling, hardness or warmth at the wound site (s)  · Unrelieved nausea/vomiting    · Foul smelling or cloudy drainage at the wound site (s)   · Unrelieved pain or increase in pain     · Increase in shortness of breath

## 2022-02-23 ENCOUNTER — HOSPITAL ENCOUNTER (OUTPATIENT)
Dept: GENERAL RADIOLOGY | Age: 42
Discharge: HOME | End: 2022-02-25
Payer: COMMERCIAL

## 2022-02-23 ENCOUNTER — OFFICE VISIT (OUTPATIENT)
Dept: NEUROSURGERY | Age: 42
End: 2022-02-23
Payer: COMMERCIAL

## 2022-02-23 ENCOUNTER — HOSPITAL ENCOUNTER (OUTPATIENT)
Age: 42
Discharge: HOME | End: 2022-02-25
Payer: COMMERCIAL

## 2022-02-23 VITALS
BODY MASS INDEX: 27.46 KG/M2 | TEMPERATURE: 98.1 F | OXYGEN SATURATION: 100 % | HEIGHT: 63 IN | SYSTOLIC BLOOD PRESSURE: 121 MMHG | HEART RATE: 99 BPM | WEIGHT: 155 LBS | DIASTOLIC BLOOD PRESSURE: 84 MMHG | RESPIRATION RATE: 16 BRPM

## 2022-02-23 DIAGNOSIS — S16.1XXD STRAIN OF NECK MUSCLE, SUBSEQUENT ENCOUNTER: Primary | ICD-10-CM

## 2022-02-23 DIAGNOSIS — M48.02 CERVICAL STENOSIS OF SPINAL CANAL: ICD-10-CM

## 2022-02-23 PROCEDURE — 72040 X-RAY EXAM NECK SPINE 2-3 VW: CPT

## 2022-02-23 PROCEDURE — G8427 DOCREV CUR MEDS BY ELIG CLIN: HCPCS | Performed by: STUDENT IN AN ORGANIZED HEALTH CARE EDUCATION/TRAINING PROGRAM

## 2022-02-23 PROCEDURE — 99213 OFFICE O/P EST LOW 20 MIN: CPT | Performed by: STUDENT IN AN ORGANIZED HEALTH CARE EDUCATION/TRAINING PROGRAM

## 2022-02-23 PROCEDURE — G8419 CALC BMI OUT NRM PARAM NOF/U: HCPCS | Performed by: STUDENT IN AN ORGANIZED HEALTH CARE EDUCATION/TRAINING PROGRAM

## 2022-02-23 PROCEDURE — 4004F PT TOBACCO SCREEN RCVD TLK: CPT | Performed by: STUDENT IN AN ORGANIZED HEALTH CARE EDUCATION/TRAINING PROGRAM

## 2022-02-23 PROCEDURE — G8484 FLU IMMUNIZE NO ADMIN: HCPCS | Performed by: STUDENT IN AN ORGANIZED HEALTH CARE EDUCATION/TRAINING PROGRAM

## 2022-02-23 NOTE — PROGRESS NOTES
Hospital Follow-up     This is a 38year old female who presents to the office for a 2 week follow-up s/p cervical strain      Subjective: Patient states she is doing well. States that her neck pain has improved and is nearly nonexistent. Patient states bump on her head is still a little sensitive but improving. Denies any HA,dizziness, or n/v. Patient states every now and then certain lights will bug her but that is improving. No new complaints. No pain in arms. No numbness or weakness. Cervical XR reviewed. Collar compliant. Physical Exam:              WDWN, no apparent distress              Non-labored breathing               Vitals Stable              Alert and oriented x3              CN 3-12 intact              PERRL              EOMI              JENSEN well              Motor strength symmetric              Sensation to LT intact bilaterally   Non tender to palpation of neck    (-) Bilateral Hoffmans                  Imagin2022 XR Cervical XR     Impression   Degenerative disc disease at C6-7 with mild interval progression.             Assessment: This is a 38 y.o.  female presenting for a 2 week  follow-up s/p cervical strain. Stable. Plan:  -Pain control and expectations discussed  -Can discontinue brace and restrictions   -OARRS report reviewed   -Follow-up in neurosurgery clinic prn  -Call or return to neurosurgery office sooner if symptoms worsen or if new issues arise in the interim.     Electronically signed by Azael Mart PA-C on 2022 at 12:41 PM

## 2022-05-03 ENCOUNTER — APPOINTMENT (OUTPATIENT)
Dept: GENERAL RADIOLOGY | Age: 42
End: 2022-05-03
Payer: COMMERCIAL

## 2022-05-03 VITALS
TEMPERATURE: 98.2 F | SYSTOLIC BLOOD PRESSURE: 140 MMHG | OXYGEN SATURATION: 100 % | HEIGHT: 63 IN | DIASTOLIC BLOOD PRESSURE: 87 MMHG | BODY MASS INDEX: 22.15 KG/M2 | WEIGHT: 125 LBS | RESPIRATION RATE: 15 BRPM | HEART RATE: 106 BPM

## 2022-05-03 PROCEDURE — 99283 EMERGENCY DEPT VISIT LOW MDM: CPT

## 2022-05-03 PROCEDURE — 73564 X-RAY EXAM KNEE 4 OR MORE: CPT

## 2022-05-04 ENCOUNTER — HOSPITAL ENCOUNTER (EMERGENCY)
Age: 42
Discharge: HOME | End: 2022-05-04
Payer: COMMERCIAL

## 2022-05-04 DIAGNOSIS — S83.91XA SPRAIN OF RIGHT KNEE, UNSPECIFIED LIGAMENT, INITIAL ENCOUNTER: Primary | ICD-10-CM

## 2022-05-04 PROCEDURE — 6370000000 HC RX 637 (ALT 250 FOR IP): Performed by: PHYSICIAN ASSISTANT

## 2022-05-04 RX ORDER — HYDROCODONE BITARTRATE AND ACETAMINOPHEN 5; 325 MG/1; MG/1
1 TABLET ORAL EVERY 6 HOURS PRN
Qty: 12 TABLET | Refills: 0 | Status: SHIPPED | OUTPATIENT
Start: 2022-05-04 | End: 2022-05-07

## 2022-05-04 RX ORDER — IBUPROFEN 600 MG/1
600 TABLET ORAL EVERY 6 HOURS PRN
Qty: 20 TABLET | Refills: 0 | Status: SHIPPED | OUTPATIENT
Start: 2022-05-04 | End: 2022-08-17

## 2022-05-04 RX ORDER — IBUPROFEN 600 MG/1
600 TABLET ORAL ONCE
Status: COMPLETED | OUTPATIENT
Start: 2022-05-04 | End: 2022-05-04

## 2022-05-04 RX ORDER — HYDROCODONE BITARTRATE AND ACETAMINOPHEN 5; 325 MG/1; MG/1
1 TABLET ORAL ONCE
Status: COMPLETED | OUTPATIENT
Start: 2022-05-04 | End: 2022-05-04

## 2022-05-04 RX ADMIN — IBUPROFEN 600 MG: 600 TABLET ORAL at 00:44

## 2022-05-04 RX ADMIN — HYDROCODONE BITARTRATE AND ACETAMINOPHEN 1 TABLET: 5; 325 TABLET ORAL at 00:44

## 2022-05-04 ASSESSMENT — PAIN DESCRIPTION - LOCATION: LOCATION: KNEE

## 2022-05-04 ASSESSMENT — PAIN SCALES - GENERAL: PAINLEVEL_OUTOF10: 10

## 2022-05-04 NOTE — Clinical Note
Javier Beatty was seen and treated in our emergency department on 5/3/2022. She may return to work on 05/05/2022. If you have any questions or concerns, please don't hesitate to call.       Mike Noguera PA-C

## 2022-05-04 NOTE — Clinical Note
Tameka Cyr accompanied Kassandra Gardner to the emergency department on 5/3/2022. They may return to school on 05/05/2022. If you have any questions or concerns, please don't hesitate to call.       Milton Staples PA-C

## 2022-05-04 NOTE — ED PROVIDER NOTES
[facility]  Department of Emergency Medicine   ED  Encounter Note  Admit Date/RoomTime: 2022 12:13 AM  ED Room:     NAME: Randall Dumont  : 1980  MRN: [id number]     Chief Complaint:  Knee Pain (right knee, fall) and Fall    History of Present Illness       Randall Dumont is a 38 y.o. old female who presents to the emergency department by private vehicle, for non traumatic pain located globally and swelling to right knee  which occured 2 hour(s) prior to arrival.  The complaint is due to running to car in rain when her right knee gave out and she ccaught her self weirdly. Since onset the symptoms have been persistent. Patient has a prior history of surgery of the mentioned area related to today's visit. Her pain is aggraveated by any attempt to bear weight and relieved by elevation. Her weight bearing ability is: difficult secondary to discomfort. She denies any head injury. Patient reports she has had a meniscus surgery on that leg 3 to 4 years ago after a minor injury going up stairs because of the injury. Patient reports she has also had an ACL tear back in high school of her left knee. She also reports she had a rotator cuff tear of her left shoulder. She follows with Dr. Belem Pemberton. Tetanus Status: up to date. ROS   Pertinent positives and negatives are stated within HPI, all other systems reviewed and are negative. Past Medical History:  has a past medical history of ADHD, Depression, PONV (postoperative nausea and vomiting), and Tear of left meniscus as current injury. Surgical History:  has a past surgical history that includes Anterior cruciate ligament repair (Left, ); Dilation and curettage of uterus (); Carpal tunnel release (Right, 2015); Rotator cuff repair (6/10/15); Rotator cuff repair (2015); other surgical history (Right, 11/18/15); and Anterior cruciate ligament repair (Left, 2020).     Social History: reports that she has been smoking cigarettes. She has a 2.50 pack-year smoking history. She has never used smokeless tobacco. She reports that she does not drink alcohol and does not use drugs. Family History: family history is not on file. Allergies: Patient has no known allergies. Physical Exam   Oxygen Saturation Interpretation: Normal.        ED Triage Vitals [05/03/22 2311]   BP Temp Temp Source Pulse Resp SpO2 Height Weight   (!) 140/87 98.2 °F (36.8 °C) Temporal 106 15 100 % 5' 3\" (1.6 m) 125 lb (56.7 kg)         Constitutional:  Alert, development consistent with age. Neck:  Normal ROM. Supple. Right Knee: global            Tenderness:  moderate. .              Swelling/Effusion: Mild. Deformity: no deformity observed/palpated. ROM: diminished range of motion. Skin:  no wounds, erythema, or swelling. Drawer's:  Negative. Lachman's: Negative. Apley's: Not Tested. Rodo's: Not Tested. Valgus/Varus Stress: Negative. Crepitus: Negative. Hip:            Tenderness:  none. Swelling: None. Deformity: no.              ROM: full range of motion. Skin:  no wounds, erythema, or swelling. Joint(s) Above/Below: calf and ankle. Tenderness:  none. Swelling: No.              Deformity: no deformity observed/palpated. ROM: full range of motion. Skin:  no wounds, erythema, or swelling. Neurovascular: Motor deficit: none. Sensory deficit: none. Pulse deficit: none. Capillary refill: normal.  Gait:  Extreme limp, pt brought her own crutches from home. Lymphatics: No lymphangitis or adenopathy noted. Neurological:  Oriented. Motor functions intact.     Lab / Imaging Results   (All laboratory and radiology results have been personally reviewed by myself)  Labs:  No results found for this visit on 05/04/22. Imaging: All Radiology results interpreted by Radiologist unless otherwise noted. XR KNEE RIGHT (MIN 4 VIEWS)   Final Result   No acute findings. ED Course / Medical Decision Making     Medications   HYDROcodone-acetaminophen (NORCO) 5-325 MG per tablet 1 tablet (1 tablet Oral Given 5/4/22 0044)   ibuprofen (ADVIL;MOTRIN) tablet 600 mg (600 mg Oral Given 5/4/22 0044)        Consult(s):   None    Procedure(s):   Placed patient in knee immobilizer, we tried the hingedknee support but she felt it did not make her knee feel stable enough. MDM:     Imaging was obtained based on low suspicion for fracture / bony abnormality, dislocation as per history/physical findings. Plan is subsequently for symptom control, limited use and immobilization with outpatient follow-up with primary orthopaedist as instructed in d/c instructions. OARRS report was reviewed and was negative. Patient's home medication list showed several medications which she no longer takes including, Abilify, Flexeril, gabapentin, Lamictal, Vyvanse, Adderall. Patient reports all she takes is Zoloft and multivitamin. Plan of Care/Counseling:  Mc Miranda PA-C reviewed today's visit with the patient in addition to providing specific details for the plan of care and counseling regarding the diagnosis and prognosis. Questions are answered at this time and are agreeable with the plan. Assessment      1. Sprain of right knee, unspecified ligament, initial encounter      Plan   Discharged home. Patient condition is good    New Medications     New Prescriptions    HYDROCODONE-ACETAMINOPHEN (NORCO) 5-325 MG PER TABLET    Take 1 tablet by mouth every 6 hours as needed for Pain for up to 3 days. IBUPROFEN (ADVIL;MOTRIN) 600 MG TABLET    Take 1 tablet by mouth every 6 hours as needed for Pain     Electronically signed by Mc Miranda PA-C   DD: 5/4/22  **This report was transcribed using voice recognition software. Every effort was made to ensure accuracy; however, inadvertent computerized transcription errors may be present.   END OF ED PROVIDER NOTE       Tamara Flores PA-C  05/04/22 [id number]

## 2022-05-05 ENCOUNTER — TELEPHONE (OUTPATIENT)
Dept: ORTHOPEDIC SURGERY | Age: 42
End: 2022-05-05

## 2022-05-05 NOTE — TELEPHONE ENCOUNTER
Pt called requesting an appt d/t recent ED yesterday, concerned for R knee injury after falling. Attempted to return call, left vm for pt to call the office to schedule next available f/u appt.

## 2022-05-06 ENCOUNTER — OFFICE VISIT (OUTPATIENT)
Dept: ORTHOPEDIC SURGERY | Age: 42
End: 2022-05-06
Payer: COMMERCIAL

## 2022-05-06 VITALS — WEIGHT: 135 LBS | HEIGHT: 63 IN | BODY MASS INDEX: 23.92 KG/M2

## 2022-05-06 DIAGNOSIS — M25.561 ACUTE PAIN OF RIGHT KNEE: Primary | ICD-10-CM

## 2022-05-06 PROCEDURE — 4004F PT TOBACCO SCREEN RCVD TLK: CPT | Performed by: ORTHOPAEDIC SURGERY

## 2022-05-06 PROCEDURE — G8427 DOCREV CUR MEDS BY ELIG CLIN: HCPCS | Performed by: ORTHOPAEDIC SURGERY

## 2022-05-06 PROCEDURE — G8420 CALC BMI NORM PARAMETERS: HCPCS | Performed by: ORTHOPAEDIC SURGERY

## 2022-05-06 PROCEDURE — 99214 OFFICE O/P EST MOD 30 MIN: CPT | Performed by: ORTHOPAEDIC SURGERY

## 2022-05-06 NOTE — PROGRESS NOTES
New Knee Patient     Referring Provider:   No referring provider defined for this encounter. CHIEF COMPLAINT:   Chief Complaint   Patient presents with    Injury    Knee Pain        HPI:    Praneeth Rojo is a 38y.o. year old female who is seen today  for evaluation of right knee pain. She reports the pain has been ongoing for the past 2 days. Patient is well-known to us and has been seen and treated for her left knee ACL tear in the past. She does recall a specific injury which started the pain. Patient states that while running to her car in the rain 2 days ago she felt her right knee buckle underneath her. Patient states she felt a pop and a crack in her knee. She denies fall to the affected extremity. She subsequently was seen and treated in the emergency department x-rays were negative for acute fracture/dislocation. Patient was placed in knee immobilizer which she discontinued. She has been weightbearing as tolerated in knee brace that she had. She denies recurrence of instability. Denies mechanical symptoms. PAST MEDICAL HISTORY  Past Medical History:   Diagnosis Date    ADHD     Depression     controlled with med, per patient    PONV (postoperative nausea and vomiting)     Tear of left meniscus as current injury        PAST SURGICAL HISTORY  Past Surgical History:   Procedure Laterality Date    ANTERIOR CRUCIATE LIGAMENT REPAIR Left 1998    ANTERIOR CRUCIATE LIGAMENT REPAIR Left 7/23/2020    LEFT KNEE ARTHROSCOPY MEDIAL MENISCUS ROOT REPAIR POSSIBLE REVISION ACL RECONSTRUCTION WITH ALLOGRAFT performed by Cora Almaraz MD at [address] Right 4/2015    DILATION AND CURETTAGE OF UTERUS  2012    OTHER SURGICAL HISTORY Right 11/18/15    RIGHT SHOULDER MANIPULATION & ARTHROSCOPY    ROTATOR CUFF REPAIR  6/10/15    right rotator cuff open revision    ROTATOR CUFF REPAIR  4/2015         FAMILY HISTORY   No family history on file.     SOCIAL HISTORY  Social History     Socioeconomic History    Marital status: Single     Spouse name: Not on file    Number of children: Not on file    Years of education: Not on file    Highest education level: Not on file   Occupational History    Not on file   Tobacco Use    Smoking status: Current Every Day Smoker     Packs/day: 0.50     Years: 5.00     Pack years: 2.50     Types: Cigarettes    Smokeless tobacco: Never Used   Vaping Use    Vaping Use: Never used   Substance and Sexual Activity    Alcohol use: No    Drug use: No    Sexual activity: Not on file   Other Topics Concern    Not on file   Social History Narrative    Not on file     Social Determinants of Health     Financial Resource Strain:     Difficulty of Paying Living Expenses: Not on file   Food Insecurity:     Worried About Running Out of Food in the Last Year: Not on file    Angy of Food in the Last Year: Not on file   Transportation Needs:     Lack of Transportation (Medical): Not on file    Lack of Transportation (Non-Medical):  Not on file   Physical Activity:     Days of Exercise per Week: Not on file    Minutes of Exercise per Session: Not on file   Stress:     Feeling of Stress : Not on file   Social Connections:     Frequency of Communication with Friends and Family: Not on file    Frequency of Social Gatherings with Friends and Family: Not on file    Attends Protestant Services: Not on file    Active Member of [address] [place] [address] or Organizations: Not on file    Attends Club or Organization Meetings: Not on file    Marital Status: Not on file   Intimate Partner Violence:     Fear of Current or Ex-Partner: Not on file    Emotionally Abused: Not on file    Physically Abused: Not on file    Sexually Abused: Not on file   Housing Stability:     Unable to Pay for Housing in the Last Year: Not on file    Number of [place] in the Last Year: Not on file    Unstable Housing in the Last Year: Not on file     Social History     Occupational History    Not on file   Tobacco Use    Smoking status: Current Every Day Smoker     Packs/day: 0.50     Years: 5.00     Pack years: 2.50     Types: Cigarettes    Smokeless tobacco: Never Used   Vaping Use    Vaping Use: Never used   Substance and Sexual Activity    Alcohol use: No    Drug use: No    Sexual activity: Not on file       CURRENT MEDICATIONS     Current Outpatient Medications:     ibuprofen (ADVIL;MOTRIN) 600 MG tablet, Take 1 tablet by mouth every 6 hours as needed for Pain, Disp: 20 tablet, Rfl: 0    HYDROcodone-acetaminophen (NORCO) 5-325 MG per tablet, Take 1 tablet by mouth every 6 hours as needed for Pain for up to 3 days. , Disp: 12 tablet, Rfl: 0    gabapentin (NEURONTIN) 100 MG capsule, Take 1 capsule by mouth every 8 hours for 14 days. (Patient not taking: Reported on 2/22/2022), Disp: 42 capsule, Rfl: 0    cyclobenzaprine (FLEXERIL) 10 MG tablet, Take 1 tablet by mouth 3 times daily as needed for Muscle spasms (Patient not taking: Reported on 2/22/2022), Disp: 10 tablet, Rfl: 0    amphetamine-dextroamphetamine (ADDERALL) 20 MG tablet, Take 20 mg by mouth Daily with lunch. (Patient not taking: Reported on 2/22/2022), Disp: , Rfl:     ARIPiprazole (ABILIFY) 5 MG tablet, Take 5 mg by mouth nightly  (Patient not taking: Reported on 2/22/2022), Disp: , Rfl:     lamoTRIgine (LAMICTAL) 200 MG tablet, Take 200 mg by mouth nightly  (Patient not taking: Reported on 2/22/2022), Disp: , Rfl:     VYVANSE 70 MG capsule, Take 70 mg by mouth every morning.   (Patient not taking: Reported on 2/22/2022), Disp: , Rfl:     sertraline (ZOLOFT) 50 MG tablet, Take 50 mg by mouth nightly  (Patient not taking: Reported on 2/22/2022), Disp: , Rfl:     Multiple Vitamins-Minerals (THERAPEUTIC MULTIVITAMIN-MINERALS) tablet, Take 1 tablet by mouth daily (Patient not taking: Reported on 2/22/2022), Disp: , Rfl:     ALLERGIES  No Known Allergies    Controlled Substances Monitoring:          REVIEW OF SYSTEMS:     Constitutional:  Negative for weight loss, fevers, chills, fatigue  Cardiovascular: Negative for chest pain, palpitations  Pulmonary: Negative for shortness of breath, labored breathing, cough  GI: negative for abdominal pain, nausea, vomitting   MSK: per HPI  Skin: negative for rash, open wounds    All other systems reviewed and are negative         PHYSICAL EXAM     There were no vitals filed for this visit. Height:    Weight: [unfilled]  BMI:  There is no height or weight on file to calculate BMI. General: The patient is alert and oriented x 3, appears to be stated age and in no distress. HEENT: head is normocephalic, atraumatic. EOMI. Neck: supple, trachea midline, no thyromegaly   Cardiovascular: peripheral pulses palpable. Normal Capillary refill   Respiratory: breathing unlabored, chest expansion symmetric   Skin: no rash, no open wounds, no erythema  Psych: normal affect; mood stable  Neurologic: gait normal, sensation grossly intact in extremities  MSK:        Lower Extremity:   Ipsilateral hip exam shows normal range of motion without pain with impingement testing. Right knee exam full range of motion present, reported pain with extreme degrees of flexion and extension. She has medial joint line tenderness on palpation. Mild swelling without palpable effusion today. Stable valgus and varus exams at 0 and 30 degrees. Posterior drawer exam was intact. There was not a good endpoint felt on her Lachman today. IMAGING:  No new imaging obtained today. Previous imaging obtained from emergency were reviewed showing no acute bony abnormality. Maintained right knee joint space        ASSESSMENT  Acute right knee pain and injury    PLAN  Today we discussed right knee. She reports onset of symptoms about 2 days ago when she was running to her car sudden onset of buckling. She patient felt a popping sensation in her knee at that time.   Imaging was negative for acute fracture dislocation. On exam today she has soft endpoint on her Lachman exam concern for potential ACL tear. We discussed proceeding with MRI of the right knee to further evaluate. Patient was in agreement. MRI order placed today. She will follow-up once obtained. RODOLFO Maynard-CNP  Orthopedic Surgery   05/06/22  11:53 AM    Staff Addendum    I have seen and evaluated the patient and agree with the assessment and plan as documented by Tabitha Walker CNP. I have performed the key components of the history and physical examination and concur with the findings and plan, and have made changes where appropriate/necessary.           Olga Bass MD  [address] [place] [address]

## 2022-06-03 ENCOUNTER — HOSPITAL ENCOUNTER (OUTPATIENT)
Dept: MRI IMAGING | Age: 42
Discharge: HOME | End: 2022-06-05
Payer: COMMERCIAL

## 2022-06-03 DIAGNOSIS — M25.561 ACUTE PAIN OF RIGHT KNEE: ICD-10-CM

## 2022-06-03 PROCEDURE — 73721 MRI JNT OF LWR EXTRE W/O DYE: CPT

## 2022-07-01 ENCOUNTER — OFFICE VISIT (OUTPATIENT)
Dept: ORTHOPEDIC SURGERY | Age: 42
End: 2022-07-01
Payer: COMMERCIAL

## 2022-07-01 VITALS — HEIGHT: 63 IN | BODY MASS INDEX: 23.92 KG/M2 | WEIGHT: 135 LBS

## 2022-07-01 DIAGNOSIS — M25.561 ACUTE PAIN OF RIGHT KNEE: Primary | ICD-10-CM

## 2022-07-01 PROCEDURE — G8420 CALC BMI NORM PARAMETERS: HCPCS | Performed by: ORTHOPAEDIC SURGERY

## 2022-07-01 PROCEDURE — 4004F PT TOBACCO SCREEN RCVD TLK: CPT | Performed by: ORTHOPAEDIC SURGERY

## 2022-07-01 PROCEDURE — G8427 DOCREV CUR MEDS BY ELIG CLIN: HCPCS | Performed by: ORTHOPAEDIC SURGERY

## 2022-07-01 PROCEDURE — 99214 OFFICE O/P EST MOD 30 MIN: CPT | Performed by: ORTHOPAEDIC SURGERY

## 2022-07-01 NOTE — PROGRESS NOTES
Follow Up Visit     Cipriano Gu returns today for follow up visit regarding Right Knee pain after an injury several months ago. She continues to have feelings of mechanical symptoms. She recently underwent an MRI. She is here to review    REVIEW OF SYSTEMS:     Constitutional:  Negative for weight loss, fevers, chills, fatigue  Cardiovascular: Negative for chest pain, palpitations  Pulmonary: Negative for shortness of breath, labored breathing, cough  GI: negative for abdominal pain, nausea, vomitting   MSK: per HPI  Skin: negative for rash, open wounds    All other systems reviewed and are negative       Physical Exam:     No data recorded    General: Alert and oriented x3, no acute distress  Cardiovascular/pulmonary: No labored breathing, peripheral perfusion intact  Musculoskeletal:    On exam, she has medial joint line tenderness. Lachman and posterior drawer stable. Knee is stable to varus and valgus at 0 and 30 degrees. There is full range of motion. There is no effusion. Rodo's is positive for pain medial    Controlled Substances Monitoring:      Imaging:  MRI of the knee reviewed showing probable medial meniscus root tear with mild extrusion. Remainder of the knee appears near normal      Assessment: Right knee medial meniscus root tear      Plan:   We discussed her knee today. She has similar findings to what we found on her other side for which she underwent repair and did well. She continues to have pain and mechanical symptoms in the right knee after an injury several months ago which have not resolved with conservative treatment. MRI was reviewed showing root tear. At this point she is a candidate for surgery which would involve right knee arthroscopy, medial meniscus root repair versus debridement. We will look at scheduling her at [facility] in the near future.   She does not require preoperative appointment    Amy Johnson MD  Orthopaedic Surgery 7/1/22  9:17 AM

## 2022-07-27 ENCOUNTER — TELEPHONE (OUTPATIENT)
Dept: ORTHOPEDIC SURGERY | Age: 42
End: 2022-07-27

## 2022-07-27 NOTE — TELEPHONE ENCOUNTER
Spoke with Lisa Valle at [facility] regarding Pre-cert for surgery 5/4/0575 Rt knee scope, medical meniscus root repair vs debridement. JTROGERS HYDE       Prior Authorization    Procedure:  Right knee arthroscopy, medial meniscus root repair  Procedure Code: 16268  Diagnosis Code:  V20.181N  Date:  8/9/2022  Facility:  [address] [place] [address]  Status: OPT  Physician:  Ruby Wagoner M.D. Call Reference Number:  [id number] 7/27/2022 2:34 p.m. Auth Number:  No pre-cert is required for surgery if performed at [address] [place] [address]. Contact: Kristen RUBIO Confirmed [address] [place] [address] is in -network with [employer]

## 2022-08-05 DIAGNOSIS — Z98.890 STATUS POST ARTHROSCOPY OF RIGHT KNEE: Primary | ICD-10-CM

## 2022-08-05 RX ORDER — HYDROCODONE BITARTRATE AND ACETAMINOPHEN 5; 325 MG/1; MG/1
1 TABLET ORAL EVERY 6 HOURS PRN
Qty: 28 TABLET | Refills: 0 | Status: SHIPPED | OUTPATIENT
Start: 2022-08-08 | End: 2022-08-15

## 2022-08-05 RX ORDER — KETOROLAC TROMETHAMINE 10 MG/1
10 TABLET, FILM COATED ORAL EVERY 6 HOURS
Qty: 8 TABLET | Refills: 0 | Status: SHIPPED
Start: 2022-08-08 | End: 2022-08-17

## 2022-08-05 NOTE — PROGRESS NOTES
Department of Orthopedic Surgery  Attending Pre-operative History and Physical        DIAGNOSIS: Right knee medial meniscus root tear    INDICATION: Failed conservative treatment    PROCEDURE: Right knee arthroscopy medial meniscus root repair versus debridement    CHIEF COMPLAINT: Right knee pain    History Obtained From:  patient    HISTORY OF PRESENT ILLNESS:      The patient is a 38 y.o. female with significant past medical history of right knee medial meniscus root tear who presents with right knee pain with mechanical symptoms following injury several months ago that has been refractory to conservative treatment. MRI of the right knee was reviewed showing a medial meniscus root tear. Given failure of conservative treatment she is interested in surgical management specifically a right knee arthroscopy medial meniscus root repair versus debridement. The risks, benefits, and alternatives to the procedure were discussed at length with the patient and family members. Risks include but are not limited to postoperative infection, neurovascular injury, blood clots, postoperative knee stiffness requiring repeat surgery versus manipulation, failure of repair if meniscus is repaired, progression of degenerative changes, and the risks of general anesthesia. Patient verbalizes understanding of the risks and wishes to proceed.        Past Medical History:        Diagnosis Date    ADHD     Depression     controlled with med, per patient    PONV (postoperative nausea and vomiting)     Tear of left meniscus as current injury        Past Surgical History:        Procedure Laterality Date    ANTERIOR CRUCIATE LIGAMENT REPAIR Left 1998    ANTERIOR CRUCIATE LIGAMENT REPAIR Left 7/23/2020    LEFT KNEE ARTHROSCOPY MEDIAL MENISCUS ROOT REPAIR POSSIBLE REVISION ACL RECONSTRUCTION WITH ALLOGRAFT performed by Jarrod Spivey MD at [address] Right 4/2015    DILATION AND CURETTAGE OF UTERUS  2012    OTHER SURGICAL HISTORY Right 11/18/15    RIGHT SHOULDER MANIPULATION & ARTHROSCOPY    ROTATOR CUFF REPAIR  6/10/15    right rotator cuff open revision    ROTATOR CUFF REPAIR  4/2015       Medications Prior to Admission:   Not in a hospital admission. Allergies:  Patient has no known allergies. History of allergic reaction to anesthesia:  No    Social History:   TOBACCO:   reports that she has been smoking cigarettes. She has a 2.50 pack-year smoking history. She has never used smokeless tobacco.  ETOH:   reports no history of alcohol use. DRUGS:   reports no history of drug use. Family History:   No family history on file. REVIEW OF SYSTEMS:  CONSTITUTIONAL:  negative  RESPIRATORY:  negative  CARDIOVASCULAR:  negative  MUSCULOSKELETAL:  negative except for  HPI  NEUROLOGICAL:  negative    PHYSICAL EXAM:     VITALS:  There were no vitals taken for this visit. Gen: AOx3, NAD    Heart:  normal apical pulses, normal S1 and S2 and no edema    Lungs:  No increased work of breathing, good air exchange     Abdomen:  no scars, non-distended and non-tender    Extremities:  No clubbing, cyanosis, or edema    Musculoskeletal:    On exam, she has medial joint line tenderness. Lachman and posterior drawer stable. Knee is stable to varus and valgus at 0 and 30 degrees. There is full range of motion. There is no effusion.   Rodo's is positive for pain medial      DATA:  CBC:   Lab Results   Component Value Date/Time    WBC 6.5 02/07/2022 04:27 AM    RBC 4.31 02/07/2022 04:27 AM    HGB 12.6 02/07/2022 04:27 AM    HCT 38.6 02/07/2022 04:27 AM    MCV 89.6 02/07/2022 04:27 AM    MCH 29.2 02/07/2022 04:27 AM    MCHC 32.6 02/07/2022 04:27 AM    RDW 13.4 02/07/2022 04:27 AM     02/07/2022 04:27 AM    MPV 10.9 02/07/2022 04:27 AM     BMP:    Lab Results   Component Value Date/Time     02/08/2022 05:08 AM    K 3.8 02/08/2022 05:08 AM     02/08/2022 05:08 AM    CO2 25 02/08/2022 05:08 AM    BUN 18 02/08/2022 05:08 AM    LABALBU 4.9 02/06/2022 02:28 PM    CREATININE 0.8 02/08/2022 05:08 AM    CALCIUM 9.1 02/08/2022 05:08 AM    GFRAA >60 02/08/2022 05:08 AM    LABGLOM >60 02/08/2022 05:08 AM    GLUCOSE 88 02/08/2022 05:08 AM       Radiology Review:  MRI of the knee reviewed showing probable medial meniscus root tear with mild extrusion. Remainder of the knee appears near normal    ASSESSMENT AND PLAN:    1. Patient is a 38 y.o. female with above specified procedure planned right knee arthroscopy medial meniscus root repair versus debridement with anesthesia    2. Procedure options, risks and benefits reviewed with patient. Patient expresses understanding and has signed consent form to proceed. 3.  Patient and family were provided with pre-op and post-op instructions, prescription medications, and any other supplied needed post op (slings, braces, etc.)    Controlled substances monitoring: possible medication side effects, risk of tolerance and/or dependence, and alternative treatments discussed, no signs of potential drug abuse or diversion identified, and OARRS report reviewed today- activity consistent with treatment plan.       RODOLFO Mcclain-CNP  Orthopaedic Surgery   8/5/22  2:21 PM

## 2022-08-10 ENCOUNTER — OFFICE VISIT (OUTPATIENT)
Dept: ORTHOPEDIC SURGERY | Age: 42
End: 2022-08-10

## 2022-08-10 VITALS — HEIGHT: 63 IN | WEIGHT: 135 LBS | BODY MASS INDEX: 23.92 KG/M2

## 2022-08-10 DIAGNOSIS — Z98.890 STATUS POST ARTHROSCOPY OF RIGHT KNEE: Primary | ICD-10-CM

## 2022-08-10 PROCEDURE — 99024 POSTOP FOLLOW-UP VISIT: CPT | Performed by: ORTHOPAEDIC SURGERY

## 2022-08-10 NOTE — PROGRESS NOTES
Follow Up Post Operative Visit     Surgery: Right knee arthroscopy, medial meniscus root repair  Date: 22    Subjective:    Jackie Dolan is here for follow up visit s/p above procedure. She is doing well. She has been compliant    Controlled Substances Monitoring:        Physical Exam:    No data recorded    General: Alert and oriented x3, no acute distress  Cardiovascular/pulmonary: No labored breathing, peripheral perfusion intact  Musculoskeletal:    Exam of the knee shows minimal swelling. Incisions are intact. Motion in small arcs is tolerable. There is intact motor and sensory function of the foot      Imagin views of the right knee obtained showing no acute abnormality with evidence of tunnel drilling and anchor placement. Impression: No acute abnormality right knee    Assessment and Plan: Status post right knee medial meniscus root repair  She is doing well. We given the protocol today which she is familiar with from having the same procedure on the opposite side. Brace will remain locked in extension for the first week. She will see Sol Roberts next week for suture removal we will begin range of motion and PT at that point. Anticipate 4 to 6 weeks nonweightbearing.     Media Hammans, MD  Orthopaedic Surgery   8/10/22  9:26 AM

## 2022-08-17 ENCOUNTER — OFFICE VISIT (OUTPATIENT)
Dept: ORTHOPEDIC SURGERY | Age: 42
End: 2022-08-17

## 2022-08-17 VITALS — HEIGHT: 63 IN | BODY MASS INDEX: 23.92 KG/M2 | WEIGHT: 135 LBS

## 2022-08-17 DIAGNOSIS — Z98.890 STATUS POST ARTHROSCOPY OF RIGHT KNEE: Primary | ICD-10-CM

## 2022-08-17 DIAGNOSIS — Z48.02 VISIT FOR SUTURE REMOVAL: ICD-10-CM

## 2022-08-17 PROCEDURE — 99024 POSTOP FOLLOW-UP VISIT: CPT | Performed by: ORTHOPAEDIC SURGERY

## 2022-08-17 RX ORDER — HYDROCODONE BITARTRATE AND ACETAMINOPHEN 5; 325 MG/1; MG/1
1 TABLET ORAL EVERY 6 HOURS PRN
Qty: 28 TABLET | Refills: 0 | Status: SHIPPED | OUTPATIENT
Start: 2022-08-17 | End: 2022-08-24

## 2022-08-17 RX ORDER — ARIPIPRAZOLE 10 MG/1
TABLET ORAL
COMMUNITY
Start: 2022-07-28

## 2022-08-17 NOTE — PROGRESS NOTES
Follow Up Post Operative Visit     Surgery: Right knee arthroscopy, medial meniscus root repair  Date: 8/9/22    Subjective:    Kelin Coleman is here for follow up visit s/p above procedure. She is doing well. She has been compliant with restrictions. Controlled Substances Monitoring:        Physical Exam:    Height: 5' 3\" (1.6 m), Weight: 135 lb (61.2 kg)    General: Alert and oriented x3, no acute distress  Cardiovascular/pulmonary: No labored breathing, peripheral perfusion intact  Musculoskeletal:    Right knee exam incision sites are closed edges well approximated surgical sutures were removed. New Steri-Strips applied. Neurovascular sensation grossly intact. Stable postoperative swelling. No signs or symptoms of infection present      Imaging: No new imaging obtained today. Previous imaging reviewed    Assessment and Plan: Status post right knee arthroscopy, medial meniscus root repair    Today we discussed her right knee. She is 1 week out from procedure listed above doing well. Today surgical sutures removed new Steri-Strip dressings applied. She will not submerge incision sites to mature scars present. Instructed to remove dressing in 1 week. She can continue with showering for basic hygiene purposes pat dry incision sites only. Unlock her brace 0 to 90 degrees when she is not ambulating. She is to remain strict nonweightbearing per protocol. She is to remain locked in extension with ambulation. She will follow-up in 6 weeks.       RODOLFO Martell-CNP  Orthopedic Surgery   08/17/22  8:45 AM

## 2022-08-24 ENCOUNTER — EVALUATION (OUTPATIENT)
Dept: PHYSICAL THERAPY | Age: 42
End: 2022-08-24
Payer: COMMERCIAL

## 2022-08-24 DIAGNOSIS — Z98.890 STATUS POST ARTHROSCOPY OF RIGHT KNEE: Primary | ICD-10-CM

## 2022-08-24 DIAGNOSIS — R26.9 GAIT DISTURBANCE: ICD-10-CM

## 2022-08-24 DIAGNOSIS — R26.2 DIFFICULTY WALKING: Primary | ICD-10-CM

## 2022-08-24 PROCEDURE — 97112 NEUROMUSCULAR REEDUCATION: CPT | Performed by: PHYSICAL THERAPIST

## 2022-08-24 PROCEDURE — 97161 PT EVAL LOW COMPLEX 20 MIN: CPT | Performed by: PHYSICAL THERAPIST

## 2022-08-24 PROCEDURE — 97110 THERAPEUTIC EXERCISES: CPT | Performed by: PHYSICAL THERAPIST

## 2022-08-24 RX ORDER — HYDROCODONE BITARTRATE AND ACETAMINOPHEN 5; 325 MG/1; MG/1
1 TABLET ORAL EVERY 6 HOURS PRN
Qty: 28 TABLET | Refills: 0 | Status: SHIPPED | OUTPATIENT
Start: 2022-08-24 | End: 2022-08-31

## 2022-08-24 NOTE — TELEPHONE ENCOUNTER
Pt called into the office today and LVM requesting a refill of her postoperative pain medication. Norco pended & routed for review.  S/P Right knee arthroscopy, medial meniscus root repair (8/9/22)

## 2022-08-25 NOTE — PROGRESS NOTES
[address] [facility]   Phone: [phone]   Fax: [phone]         Date:  2022   Patient: Jackie Dolan  : 1980  MRN: [id number]  Referring Provider: Jourdan Pimentel, RODOLFO - CNP  [address]     Medical Diagnosis:     V95.807 (ICD-10-CM) - Status post arthroscopy of right knee    Surgery: Right knee arthroscopy, medial meniscus root repair  Date: 22        Evaluate and treat range of motion, strengthening, modalities per postoperative protocol    Surgery: Right knee arthroscopy, medial meniscus root repair  Date: 22     SUBJECTIVE:     Onset date: 2022    Onset: Sudden onset    Mechanism of Injury: The pt reports that she was running in a wet parking lot & her right knee \"gave out\". Previous PT: none     Medical Management for Current Problem: right knee arthroscopy medial meniscus root repair 2022. Chief complaint: pain, edema, decreased motion, decreased mobility, weakness, inability / limited ability to use leg, difficulty walking, difficulty with stairs    Behavior: condition is staying the same    Pain: pt presents with c/o post-surgical pain & soreness at right knee      Imaging results: XR KNEE RIGHT (1-2 VIEWS)    Result Date: 8/10/2022  Radiology exam is complete. No Radiologist dictation. Please follow up with ordering provider.        Past Medical History:  Past Medical History:   Diagnosis Date    ADHD     Depression     controlled with med, per patient    PONV (postoperative nausea and vomiting)     Tear of left meniscus as current injury      Past Surgical History:   Procedure Laterality Date    ANTERIOR CRUCIATE LIGAMENT REPAIR Left     ANTERIOR CRUCIATE LIGAMENT REPAIR Left 2020    LEFT KNEE ARTHROSCOPY MEDIAL MENISCUS ROOT REPAIR POSSIBLE REVISION ACL RECONSTRUCTION WITH ALLOGRAFT performed by Kvng Silvestre MD at [address] Right 2015    DILATION AND CURETTAGE OF UTERUS 2012    OTHER SURGICAL HISTORY Right 11/18/15    RIGHT SHOULDER MANIPULATION & ARTHROSCOPY    ROTATOR CUFF REPAIR  6/10/15    right rotator cuff open revision    ROTATOR CUFF REPAIR  4/2015       Medications:   Current Outpatient Medications   Medication Sig Dispense Refill    HYDROcodone-acetaminophen (NORCO) 5-325 MG per tablet Take 1 tablet by mouth every 6 hours as needed for Pain for up to 7 days. 28 tablet 0    ARIPiprazole (ABILIFY) 10 MG tablet        No current facility-administered medications for this visit. 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 Exercise regimen: none    Hobbies: pt has three children    Patient Goals: pain relief, return to prior activity, full use of leg, get back to normal, get rid of assistive device    Precautions/Contraindications: recent surgery    OBJECTIVE:     Observations: well nourished female    Inspection: surgical incisions right knee arthroscopy healing well    Edema: Mod edema RLE knee & lower leg    Gait: ambulates with crutches, RLE knee brace locked at ext, & RLE NWB    Joint/Motion:    Knee:  Right:   AROM: 73° Flexion,  -3° Extension      Muscle Length Testing: Moderate+ restrictions with RLE hamstring flexibility     Strength:    Knee:   Right: Hip: 3/5, Knee: 3-/5    Palpation: no abnormal tenderness present       Special test comments: NA due to recent surgery      ASSESSMENT     Outcome Measure: Lower Extremity Functional Scale (LEFS) 87.5% impairment    Problems:   Pain reported 5/10  ROM decreased   Strength decreased   Decreased functional ability with walking, running, stairs, standing, work, use of right lower extremity, limited tolerance to weightbearing tasks and weightbearing duration, lifting, carrying, driving    Reason for Skilled Care:  The pt presents with post-surgical pain & soreness, limited rom, restricted flexibility, RLE NWB, weakness, gait deviations, & functional limitations due to recent surgery 8/9/2022. Therefore, I recommend that the pt requires the skilled services of outpt PT Rehab to achieve LTGs, restore & resolve deficits & limitations, & facilitate return to prior level of function/activity. [x] There are no barriers affecting plan of care or recovery    [] Barriers to this patient's plan of care or recovery include. Domestic Concerns:  [x] No  [] Yes:    Short Term goals (3 weeks)  Decrease reported pain to 2/10  Increase ROM to within physician's protocol   Increase Strength to 4+/5   Able to perform/complete the following functions/tasks: no gait deviations with AD  Lower Extremity Functional Scale (LEFS) 30% impairment    Long Term goals (6 weeks)  Decrease reported pain to 0/10  Increase ROM to WNL within physician's protocol  Increase Strength to 5/5   Able to perform/complete the following functions/tasks: pt returns to prior level of function. LEFS 10% impairment   Independent with Home Exercise Programs    Rehab Potential: [x] Good  [] Fair  [] Poor    PLAN       Treatment Plan: 2x/wk x 6wks  [x] Therapeutic Exercise  [x] Therapeutic Activity  [x] Neuromuscular Re-education   [x] Gait Training  [x] Balance Training  [x] Aerobic conditioning  [x] Manual Therapy  [] Massage/Fascial release   [] Work/Sport specific activities    [] Pain Neuroscience [x] Cold/hotpack  [x] Vasocompression  [] Electrical Stimulation  [] Lumbar/Cervical Traction  [] Ultrasound   [] Iontophoresis: 4 mg/mL Dexamethasone Sodium Phosphate 40-80 mAmin  [] Dry Needling      [x] Instruction in HEP      []  Medication allergies reviewed for use of Dexamethasone Sodium Phosphate 4mg/ml  with iontophoresis treatments. Patient is not allergic.       The following CPT codes are likely to be used in the care of this patient: 03205 PT Evaluation: Low Complexity   68501 Therapeutic Exercise   85018 Neuromuscular Re-Education   16743 Therapeutic Activities   77701 Manual Therapy   87831 Gait Training   07814 Vasopneumatic Device    Electrical Stimulation      Suggested Professional Referral: [x] No  [] Yes:     Patient Education:  [x] Plans/Goals, Risks/Benefits discussed  [x] Home exercise program  Method of Education: [x] Verbal  [x] Demo  [x] Written  Comprehension of Education:  [x] Verbalizes understanding. [x] Demonstrates understanding. [] Needs Review. [] Demonstrates/verbalizes understanding of HEP/Ed previously given. Frequency: 2 days per week for 6 weeks    Patient understands diagnosis/prognosis and consents to treatment, plan and goals: [x] Yes    [] No     Thank you for the opportunity to work with your patient. If you have questions or comments, please contact me at numbers listed above. Electronically signed by: Darren Brand, [facility] [address]         Please sign Physician's Certification and return to: [address] [place] [address]  Dept: [phone]  Dept Fax: [phone]  Loc: [phone] Certification / Comments     Frequency/Duration 2 days per week for 6 weeks. Certification period from 8/24/2022  to 10/24/2022. I have reviewed the Plan of Care established for skilled therapy services and certify that the services are required and that they will be provided while the patient is under my care.     Physician's Comments/Revisions:               Physician's Printed Name:                                           [de-identified] Signature:                                                               Date:

## 2022-08-26 ENCOUNTER — TREATMENT (OUTPATIENT)
Dept: PHYSICAL THERAPY | Age: 42
End: 2022-08-26
Payer: COMMERCIAL

## 2022-08-26 DIAGNOSIS — M25.562 ACUTE PAIN OF LEFT KNEE: ICD-10-CM

## 2022-08-26 DIAGNOSIS — R26.9 GAIT DISTURBANCE: ICD-10-CM

## 2022-08-26 DIAGNOSIS — R26.2 DIFFICULTY WALKING: Primary | ICD-10-CM

## 2022-08-26 PROCEDURE — 97112 NEUROMUSCULAR REEDUCATION: CPT | Performed by: PHYSICAL THERAPIST

## 2022-08-26 PROCEDURE — 97110 THERAPEUTIC EXERCISES: CPT | Performed by: PHYSICAL THERAPIST

## 2022-08-29 ENCOUNTER — TREATMENT (OUTPATIENT)
Dept: PHYSICAL THERAPY | Age: 42
End: 2022-08-29
Payer: COMMERCIAL

## 2022-08-29 DIAGNOSIS — M25.562 ACUTE PAIN OF LEFT KNEE: ICD-10-CM

## 2022-08-29 DIAGNOSIS — R26.2 DIFFICULTY WALKING: Primary | ICD-10-CM

## 2022-08-29 DIAGNOSIS — R26.9 GAIT DISTURBANCE: ICD-10-CM

## 2022-08-29 PROCEDURE — 97110 THERAPEUTIC EXERCISES: CPT | Performed by: PHYSICAL THERAPIST

## 2022-08-29 PROCEDURE — 97112 NEUROMUSCULAR REEDUCATION: CPT | Performed by: PHYSICAL THERAPIST

## 2022-08-29 NOTE — PROGRESS NOTES
[address] [facility]   Phone: [phone]   Fax: [phone]      Physical Therapy Daily Treatment Note    Date: 2022  Patient Name: Amando Porras  : 1980   MRN: [id number]  DOInjury: 2022   DOSx: 2022  Referring Provider: Damion Han MD  [address] [facility]     Medical Diagnosis:     Surgery: Right knee arthroscopy, medial meniscus root repair  Date: 22     Surgery: Right knee arthroscopy, medial meniscus root repair  Date: 22        Evaluate and treat range of motion, strengthening, modalities per postoperative protocol    Z98.890 (ICD-10-CM) - Status post arthroscopy of right knee    Outcome Measure: LEFS 87.5% Disability    S: Pt presents with no new complaints today. The pt AMB into outpt PT clinic with axillary crutches, RLE knee brace locked, & while displaying RLE NWB. O: Please refer to PT Eval       2022 PT removed steri-stripes for arthroscopy incisions. PT had orthopedic physician's office observe surgical site & cleared pt. Time [id number]     Visit  Repeat outcome measure at mid point and end. Pain      Strength      Palpation      ROM      Modalities            Manual            Stretch      IT band supine      HS supine      Quad Prone      Exercise      Quad sets X10 10s  NR   Supine heel slides x30 arom TE   Side-lying hip add X10 10s  NR   Side-lying hip abd X10 10s  NR   Supine-SLR X10 10s  NR   Prone hip ext X10 10s  NR   Prone leg curl x30  TE   Supine hamstring stretch with strap X3 30s  TE   Seated hamstring stretch with strap X30 30s On mat TE                       NMR To improve balance for safe community and home ambulation    Resisted walk      FWD      BKWD      lat      March      Side stepping      Retro walk      Heel to toe      A:  Tolerated well. The pt progressed with today's Tx session to perform 10s static holds of mat SLRs all four planes.     P: Continue with rehab plan by following orthopedic surgeon's protocol.      Ashley Delarosa, PT [facility] [address]    Treatment Charges: Mins Units   Initial Evaluation     Re-Evaluation     Ther Exercise         TE 13 1   Manual Therapy     MT     Ther Activities        TA     Gait Training          GT     Neuro Re-education NR 27 2   Modalities     Non-Billable Service Time     Other     Total Time/Units 40 3

## 2022-08-29 NOTE — PROGRESS NOTES
[address] [facility]   Phone: [phone]   Fax: [phone]      Physical Therapy Daily Treatment Note    Date: 2022  Patient Name: Connie Liao  : 1980   MRN: [id number]  DOInjury: 2022   DOSx: 2022  Referring Provider: Chana Avila MD  [address]     Medical Diagnosis:     Surgery: Right knee arthroscopy, medial meniscus root repair  Date: 22     Surgery: Right knee arthroscopy, medial meniscus root repair  Date: 22        Evaluate and treat range of motion, strengthening, modalities per postoperative protocol    Z98.890 (ICD-10-CM) - Status post arthroscopy of right knee    Outcome Measure: LEFS 87.5% Disability    S:   The pt AMB into outpt PT clinic with axillary crutches, RLE knee brace locked, & while displaying RLE NWB. Pt reports good compliance with HEP. O: Please refer to PT Eval       2022 PT removed steri-stripes for arthroscopy incisions. PT had orthopedic physician's office observe surgical site & cleared pt. Time [id number]     Visit 3/12 Repeat outcome measure at mid point and end. Pain      Strength      Palpation      ROM      Modalities            Manual            Stretch      IT band supine      HS supine      Quad Prone      Exercise      Quad sets X10 10s  NR   Supine heel slides x30 arom TE   Side-lying hip add X10 10s  NR   Side-lying hip abd X10 10s 2lb NR   Supine-SLR X10 10s 2lb NR   Prone hip ext X10 10s 2lb NR   Prone leg curl x30 2lb TE   Supine hamstring stretch with strap X3 30s  TE   Seated hamstring stretch with strap X30 30s On mat TE   Long sit heel slides x30  TE   Foot on wall heel slides x30  TE   Prone knee hang for ext stretch x3min 5lb TE     NMR To improve balance for safe community and home ambulation    Resisted walk      FWD      BKWD      lat      March      Side stepping      Retro walk      Heel to toe      A:  Tolerated well.   The pt progressed with today's Tx session to perform some of mat exercises with 2lb cuff weight. The pt experienced pain with hip add with 2lb & performed hip add without weight pain free. The pt c/o left knee soreness following today's Tx session. Pt instructed to limited knee flex to 90degrees with exercises. P: Continue with rehab plan by following orthopedic surgeon's protocol.      Mercedes Mcarthur, PT [facility] [address]252    Treatment Charges: Mins Units   Initial Evaluation     Re-Evaluation     Ther Exercise         TE 17 1   Manual Therapy     MT     Ther Activities        TA     Gait Training          GT     Neuro Re-education NR 23 2   Modalities     Non-Billable Service Time     Other     Total Time/Units 40 3

## 2022-09-01 ENCOUNTER — TREATMENT (OUTPATIENT)
Dept: PHYSICAL THERAPY | Age: 42
End: 2022-09-01
Payer: COMMERCIAL

## 2022-09-01 DIAGNOSIS — R26.2 DIFFICULTY WALKING: Primary | ICD-10-CM

## 2022-09-01 DIAGNOSIS — R26.9 GAIT DISTURBANCE: ICD-10-CM

## 2022-09-01 DIAGNOSIS — M25.562 ACUTE PAIN OF LEFT KNEE: ICD-10-CM

## 2022-09-01 PROCEDURE — 97140 MANUAL THERAPY 1/> REGIONS: CPT | Performed by: PHYSICAL THERAPIST

## 2022-09-01 PROCEDURE — 97112 NEUROMUSCULAR REEDUCATION: CPT | Performed by: PHYSICAL THERAPIST

## 2022-09-01 PROCEDURE — 97110 THERAPEUTIC EXERCISES: CPT | Performed by: PHYSICAL THERAPIST

## 2022-09-01 NOTE — PROGRESS NOTES
2lb cuff weight with today's Tx session due to c/o increased soreness since her last PT Tx session. Pt c/o RLE felt \"heavy\" during today's Tx session. Pt reported that her leg felt lighter & less sore following manual therapy. PT instructed the pt not to bend her knee beyond 90degrees with HEP & PT gym exercises. P: Continue with rehab plan by following orthopedic surgeon's protocol.      Maury Lane, PT [facility] [address]    Treatment Charges: Mins Units   Initial Evaluation     Re-Evaluation     Ther Exercise         TE 20 1   Manual Therapy     MT 15 1   Ther Activities        TA     Gait Training          GT     Neuro Re-education NR 25 2   Modalities     Non-Billable Service Time     Other     Total Time/Units 60 4

## 2022-09-02 DIAGNOSIS — Z98.890 STATUS POST ARTHROSCOPY OF RIGHT KNEE: Primary | ICD-10-CM

## 2022-09-02 RX ORDER — HYDROCODONE BITARTRATE AND ACETAMINOPHEN 5; 325 MG/1; MG/1
1 TABLET ORAL EVERY 6 HOURS PRN
Qty: 28 TABLET | Refills: 0 | Status: SHIPPED | OUTPATIENT
Start: 2022-09-02 | End: 2022-09-09

## 2022-09-02 NOTE — TELEPHONE ENCOUNTER
Surgery: Right knee arthroscopy, medial meniscus root repair  Date: 8/9/22    Requesting refill   [address] and routed

## 2022-09-13 ENCOUNTER — TREATMENT (OUTPATIENT)
Dept: PHYSICAL THERAPY | Age: 42
End: 2022-09-13
Payer: COMMERCIAL

## 2022-09-13 DIAGNOSIS — R26.9 GAIT DISTURBANCE: ICD-10-CM

## 2022-09-13 DIAGNOSIS — R26.2 DIFFICULTY WALKING: Primary | ICD-10-CM

## 2022-09-13 PROCEDURE — 97110 THERAPEUTIC EXERCISES: CPT | Performed by: PHYSICAL THERAPIST

## 2022-09-13 PROCEDURE — 97112 NEUROMUSCULAR REEDUCATION: CPT | Performed by: PHYSICAL THERAPIST

## 2022-09-14 DIAGNOSIS — Z98.890 STATUS POST ARTHROSCOPY OF RIGHT KNEE: Primary | ICD-10-CM

## 2022-09-14 RX ORDER — HYDROCODONE BITARTRATE AND ACETAMINOPHEN 5; 325 MG/1; MG/1
1 TABLET ORAL EVERY 6 HOURS PRN
Qty: 28 TABLET | Refills: 0 | Status: SHIPPED | OUTPATIENT
Start: 2022-09-14 | End: 2022-09-21

## 2022-09-15 ENCOUNTER — TREATMENT (OUTPATIENT)
Dept: PHYSICAL THERAPY | Age: 42
End: 2022-09-15
Payer: COMMERCIAL

## 2022-09-15 DIAGNOSIS — R26.9 GAIT DISTURBANCE: ICD-10-CM

## 2022-09-15 DIAGNOSIS — M25.562 ACUTE PAIN OF LEFT KNEE: ICD-10-CM

## 2022-09-15 DIAGNOSIS — R26.2 DIFFICULTY WALKING: Primary | ICD-10-CM

## 2022-09-15 PROCEDURE — 97112 NEUROMUSCULAR REEDUCATION: CPT

## 2022-09-15 PROCEDURE — 97110 THERAPEUTIC EXERCISES: CPT

## 2022-09-15 NOTE — PROGRESS NOTES
[address] [facility]   Phone: [phone]   Fax: [phone]      Physical Therapy Daily Treatment Note    Date: 9/15/2022  Patient Name: Ana Mayfield  : 1980   MRN: [id number]  DOInjury: 2022   DOSx: 2022  Referring Provider: Napoleon Mosley MD  [address]     Medical Diagnosis:   Surgery: Right knee arthroscopy, medial meniscus root repair  Date: 22   Surgery: Right knee arthroscopy, medial meniscus root repair  Date: 22  Evaluate and treat range of motion, strengthening, modalities per postoperative protocol  Z98.890 (ICD-10-CM) - Status post arthroscopy of right knee    Outcome Measure: LEFS 87.5% Disability    S: Patient reports to therapy c no new complaints this day. O: Please refer to PT Eval       2022 PT removed steri-stripes for arthroscopy incisions. PT had orthopedic physician's office observe surgical site & cleared pt. Time 3877-5280     Visit  Repeat outcome measure at mid point and end. Pain      Strength      Palpation      ROM      Modalities            Manual            Exercise      Quad sets X15 10s Heel propped NR   Side-lying clamshell X15 10s Blue band NR   Side-lying hip add X15 10s 3lb NR   Side-lying hip abd  X15 10s 3lb NR   Supine-SLR X15 10s 3lb NR   Prone hip ext X15 10s 3lb NR   Prone leg curl x30 3lb TE   Supine hamstring stretch with strap X5 30s  TE   Seated hamstring stretch with strap X5 30s On mat TE   Prone knee hang for ext stretch x3min 5lb TE                                        A:  Tolerated well. Patient denies any pain during session and was able to perform all reps as instructed to her. Continued to follow protocol patients post-op protocol. Will progress when able. P: Continue with rehab plan by following orthopedic surgeon's protocol.    Camilla Oropeza PTA [id number]    Treatment Charges: Mins Units   Initial Evaluation     Re-Evaluation     Ther Exercise TE 15 1   Manual Therapy     MT     Ther Activities        TA     Gait Training          GT     Neuro Re-education NR 25 2   Modalities     Non-Billable Service Time     Other     Total Time/Units 40 3

## 2022-09-20 ENCOUNTER — TREATMENT (OUTPATIENT)
Dept: PHYSICAL THERAPY | Age: 42
End: 2022-09-20
Payer: COMMERCIAL

## 2022-09-20 DIAGNOSIS — R26.2 DIFFICULTY WALKING: Primary | ICD-10-CM

## 2022-09-20 DIAGNOSIS — R26.9 GAIT DISTURBANCE: ICD-10-CM

## 2022-09-20 DIAGNOSIS — M25.562 ACUTE PAIN OF LEFT KNEE: ICD-10-CM

## 2022-09-20 PROCEDURE — 97112 NEUROMUSCULAR REEDUCATION: CPT

## 2022-09-20 PROCEDURE — 97110 THERAPEUTIC EXERCISES: CPT

## 2022-09-20 NOTE — PROGRESS NOTES
[address] [facility]   Phone: [phone]   Fax: [phone]      Physical Therapy Daily Treatment Note    Date: 2022  Patient Name: Connie Liao  : 1980   MRN: [id number]  DOInjury: 2022   DOSx: 2022  Referring Provider: Nelly Valle MD  No address on file     Medical Diagnosis:   Surgery: Right knee arthroscopy, medial meniscus root repair  Date: 22   Surgery: Right knee arthroscopy, medial meniscus root repair  Date: 22  Evaluate and treat range of motion, strengthening, modalities per postoperative protocol  Z98.890 (ICD-10-CM) - Status post arthroscopy of right knee    Outcome Measure: LEFS 87.5% Disability    S: Patient reports to therapy and states no new complaints. O: Please refer to PT Eval       2022 PT removed steri-stripes for arthroscopy incisions. PT had orthopedic physician's office observe surgical site & cleared pt. Time 0516-3011     Visit  Repeat outcome measure at mid point and end. Pain      Strength      Palpation      ROM      Modalities            Manual            Exercise            Quad sets X15 10s Heel propped NR   Side-lying clamshell X15 10s Blue band NR   Side-lying hip add X15 10s Orange band   3lb NR   Side-lying hip abd  X15 10s Orange band   3lb NR   Supine-SLR X15 10s Orange band   3lb NR   Prone hip ext X15 10s Orange band   3lb NR   Prone leg curl x30 Orange band   3lb TE   Supine hamstring stretch with strap X5 30s  TE   Seated hamstring stretch with strap X5 30s On mat TE   TE          Seated muncies x20 3 pos NR                           A:  Tolerated well. Patient denies any pain during session and was able to perform all reps as instructed to her. Continued to follow protocol patients post-op protocol. Will progress when able. .  P: Continue with rehab plan by following orthopedic surgeon's protocol.    Rossy Longo PTA [id number]    Treatment Charges: Mins Units   Initial Evaluation Re-Evaluation     Ther Exercise         TE 15 1   Manual Therapy     MT     Ther Activities        TA     Gait Training          GT     Neuro Re-education NR 25 2   Modalities     Non-Billable Service Time     Other     Total Time/Units 40 3

## 2022-09-22 ENCOUNTER — TREATMENT (OUTPATIENT)
Dept: PHYSICAL THERAPY | Age: 42
End: 2022-09-22
Payer: COMMERCIAL

## 2022-09-22 DIAGNOSIS — R26.2 DIFFICULTY WALKING: Primary | ICD-10-CM

## 2022-09-22 DIAGNOSIS — R26.9 GAIT DISTURBANCE: ICD-10-CM

## 2022-09-22 PROCEDURE — 97110 THERAPEUTIC EXERCISES: CPT | Performed by: PHYSICAL THERAPIST

## 2022-09-22 PROCEDURE — 97112 NEUROMUSCULAR REEDUCATION: CPT | Performed by: PHYSICAL THERAPIST

## 2022-09-22 NOTE — PROGRESS NOTES
[address] [facility]   Phone: [phone]   Fax: [phone]      Physical Therapy Daily Treatment Note    Date: 2022  Patient Name: Darnell Saavedra  : 1980   MRN: [id number]  DOInjury: 2022   DOSx: 2022  Referring Provider: Ady Vela MD  No address on file     Medical Diagnosis:   Surgery: Right knee arthroscopy, medial meniscus root repair  Date: 22   Surgery: Right knee arthroscopy, medial meniscus root repair  Date: 22  Evaluate and treat range of motion, strengthening, modalities per postoperative protocol  Z98.890 (ICD-10-CM) - Status post arthroscopy of right knee    Outcome Measure: LEFS 87.5% Disability    S: Patient reports to PT that she has been driving with RLE.    O: Please refer to PT Eval       2022 PT removed steri-stripes for arthroscopy incisions. PT had orthopedic physician's office observe surgical site & cleared pt. Time 1810-0574     Visit  Repeat outcome measure at mid point and end. Pain      Strength      Palpation      ROM      Modalities            Manual            Exercise            Quad sets X10 10s Heel propped NR   Side-lying clamshell X10 10s purple band NR   Side-lying hip add X10 10s   3lb NR   Side-lying hip abd  X10 10s   3lb NR   Supine-SLR X10 10s   3lb NR   Prone hip ext X10 10s   3lb NR   Prone leg curl x30 3lb TE   Supine hamstring stretch with strap X5 30s  TE   Seated hamstring stretch with strap X5 30s On mat TE   Prone knee hang for ext stretch x5min 5lb TE          Glute bridging X10 10s 10lb NR   Glute bridging with hip add X10 10s 10lb  Ball squeeze NR   Glute bridging with hip abd X10 10s 10lb  Purple band NR         A:  Tolerated well. The pt progressed with today's Tx session to perform glute bridging without diffiuclty. P: Continue with rehab plan by following orthopedic surgeon's protocol.      Acacia Goldstein, PT OHPT [id number]    Treatment Charges: Mins Units   Initial Evaluation Re-Evaluation     Ther Exercise         TE 10 1   Manual Therapy     MT     Ther Activities        TA     Gait Training          GT     Neuro Re-education NR 30 2   Modalities     Non-Billable Service Time     Other     Total Time/Units 40 3

## 2022-09-23 DIAGNOSIS — Z98.890 STATUS POST ARTHROSCOPY OF RIGHT KNEE: Primary | ICD-10-CM

## 2022-09-23 RX ORDER — HYDROCODONE BITARTRATE AND ACETAMINOPHEN 5; 325 MG/1; MG/1
1 TABLET ORAL EVERY 6 HOURS PRN
Qty: 28 TABLET | Refills: 0 | Status: SHIPPED | OUTPATIENT
Start: 2022-09-23 | End: 2022-09-30

## 2022-09-23 NOTE — TELEPHONE ENCOUNTER
Patient called office requesting refill on post op medication.     Surgery: Right knee arthroscopy, medial meniscus root repair  Date: 8/9/22    Last refill: 9/14/2022     Prior orders:      Medication pended and routed    [facility]

## 2022-09-27 ENCOUNTER — TREATMENT (OUTPATIENT)
Dept: PHYSICAL THERAPY | Age: 42
End: 2022-09-27
Payer: COMMERCIAL

## 2022-09-27 DIAGNOSIS — R26.9 GAIT DISTURBANCE: ICD-10-CM

## 2022-09-27 DIAGNOSIS — R26.2 DIFFICULTY WALKING: Primary | ICD-10-CM

## 2022-09-27 PROCEDURE — 97112 NEUROMUSCULAR REEDUCATION: CPT | Performed by: PHYSICAL THERAPIST

## 2022-09-27 PROCEDURE — 97110 THERAPEUTIC EXERCISES: CPT | Performed by: PHYSICAL THERAPIST

## 2022-09-27 NOTE — PROGRESS NOTES
[address] [facility]   Phone: [phone]   Fax: [phone]      Physical Therapy Daily Treatment Note    Date: 2022  Patient Name: Rosaura Mcneil  : 1980   MRN: [id number]  DOInjury: 2022   DOSx: 2022  Referring Provider: Cynthia Wise MD  No address on file     Medical Diagnosis:   Surgery: Right knee arthroscopy, medial meniscus root repair  Date: 22   Surgery: Right knee arthroscopy, medial meniscus root repair  Date: 22  Evaluate and treat range of motion, strengthening, modalities per postoperative protocol  Z98.890 (ICD-10-CM) - Status post arthroscopy of right knee    Outcome Measure: LEFS 87.5% Disability    S: Patient reports to PT that she has been driving with RLE.    O: Please refer to PT Eval       2022 PT removed steri-stripes for arthroscopy incisions. PT had orthopedic physician's office observe surgical site & cleared pt. Time 3389-4437     Visit  Repeat outcome measure at mid point and end. Pain      Strength      Palpation      ROM      Modalities            Manual            Exercise            Quad sets X10 10s Heel propped NR   Side-lying clamshell X10 10s purple band NR   Side-lying hip add X10 10s   3lb NR   Side-lying hip abd  X10 10s   3lb NR   Supine-SLR X10 10s   3lb NR   Prone hip ext X10 10s   3lb NR   Prone leg curl x30 purple   3lb TE   Supine hamstring stretch with strap X5 30s  TE   Seated hamstring stretch with strap X5 30s On mat TE    Seated leg curl x30 Purple band TE   Glute bridging X10 10s 15lb NR   Glute bridging with hip add X10 10s 15lb  Ball squeeze NR   Glute bridging with hip abd X10 10s 15lb  Purple band NR         A:  Tolerated well. Pt progressed with Tx session to perform leg curls with pruple band, & bridges with 15lbs. P: Continue with rehab plan by following orthopedic surgeon's protocol.      Joycelyn Wadsworth, PT OHPT [id number]    Treatment Charges: Mins Units   Initial Evaluation Re-Evaluation     Ther Exercise         TE 10 1   Manual Therapy     MT     Ther Activities        TA     Gait Training          GT     Neuro Re-education NR 30 2   Modalities     Non-Billable Service Time     Other     Total Time/Units 40 3

## 2022-09-28 ENCOUNTER — OFFICE VISIT (OUTPATIENT)
Dept: ORTHOPEDIC SURGERY | Age: 42
End: 2022-09-28

## 2022-09-28 DIAGNOSIS — Z98.890 STATUS POST ARTHROSCOPY OF RIGHT KNEE: Primary | ICD-10-CM

## 2022-09-28 PROCEDURE — 99024 POSTOP FOLLOW-UP VISIT: CPT | Performed by: ORTHOPAEDIC SURGERY

## 2022-09-28 NOTE — PROGRESS NOTES
Follow Up Post Operative Visit     Surgery: Right knee arthroscopy, medial meniscus root repair  Date: 8/9/22    Subjective:    Ángel Jesus is here for follow up visit s/p above procedure. She is doing well. She has been making good progress with PT and has been compliant with. She reports no pain    Controlled Substances Monitoring:        Physical Exam:    No data recorded    General: Alert and oriented x3, no acute distress  Cardiovascular/pulmonary: No labored breathing, peripheral perfusion intact  Musculoskeletal:    Exam of the knee shows full range of motion today. Portals are healed. There is no swelling. The knee is stable. There is no joint line tenderness      Imaging: No new images. Previous images reviewed    Assessment and Plan: 6 weeks out from right knee medial meniscus root repair  She is doing well. She can wean herself from the brace and continue to progress weightbearing as tolerated.   She will follow-up in 6 weeks    Rogerio Freeman MD  Orthopaedic Surgery   9/28/22  8:47 AM

## 2022-09-29 ENCOUNTER — TREATMENT (OUTPATIENT)
Dept: PHYSICAL THERAPY | Age: 42
End: 2022-09-29
Payer: COMMERCIAL

## 2022-09-29 DIAGNOSIS — R26.9 GAIT DISTURBANCE: ICD-10-CM

## 2022-09-29 DIAGNOSIS — M25.562 ACUTE PAIN OF LEFT KNEE: ICD-10-CM

## 2022-09-29 DIAGNOSIS — R26.2 DIFFICULTY WALKING: Primary | ICD-10-CM

## 2022-09-29 PROCEDURE — 97112 NEUROMUSCULAR REEDUCATION: CPT

## 2022-09-29 PROCEDURE — 97530 THERAPEUTIC ACTIVITIES: CPT

## 2022-09-29 NOTE — PROGRESS NOTES
[address] [facility]   Phone: [phone]   Fax: [phone]      Physical Therapy Daily Treatment Note    Date: 2022  Patient Name: Joanna Mcburney  : 1980   MRN: [id number]  DOInjury: 2022   DOSx: 2022  Referring Provider: Jayy Alarcon MD  No address on file     Medical Diagnosis:   Surgery: Right knee arthroscopy, medial meniscus root repair  Date: 22   Surgery: Right knee arthroscopy, medial meniscus root repair  Date: 22  Evaluate and treat range of motion, strengthening, modalities per postoperative protocol  Z98.890 (ICD-10-CM) - Status post arthroscopy of right knee    Outcome Measure: LEFS 87.5% Disability    S: Patient reports to PT and states she was cleared to remove brace as well as AD. O: Please refer to PT Eval       2022 PT removed steri-stripes for arthroscopy incisions. PT had orthopedic physician's office observe surgical site & cleared pt. Time 4425-3671     Visit  Repeat outcome measure at mid point and end. Pain      Strength      Palpation      ROM      Modalities            Manual            Exercise            NR   NR   NR   NR   NR   NR   TE    TE   On mat TE   Purple band TE   NR   NR   NR         Standing SLRs  x30 Fwd/lat/bwd TA   Standing hs curls x30  TA   Standing marches x30  TA   Standing HRs x30  TA         Standing resisted step-outs  -fwd/lat/bwd x30 YTB NR         Standing TKEs x30 YTB TA         Step-ups  -fwd  - RLE remaninig on step and step down c LLE  x30 4\" TA               A:  Tolerated well. Pt progressed with Tx session to perform standing SLRs and resisted step-outs and step-ups. Will continue to progress c more closed chain exercise. P: Continue with rehab plan by following orthopedic surgeon's protocol.      Enrico Jurado PTA [id number]    Treatment Charges: Mins Units   Initial Evaluation     Re-Evaluation     Ther Exercise         TE     Manual Therapy     MT     Ther Activities TA 25 2   Gait Training          GT     Neuro Re-education NR 15 1   Modalities     Non-Billable Service Time     Other     Total Time/Units 40 3

## 2022-10-04 ENCOUNTER — TREATMENT (OUTPATIENT)
Dept: PHYSICAL THERAPY | Age: 42
End: 2022-10-04
Payer: COMMERCIAL

## 2022-10-04 DIAGNOSIS — M25.562 ACUTE PAIN OF LEFT KNEE: ICD-10-CM

## 2022-10-04 DIAGNOSIS — R26.2 DIFFICULTY WALKING: Primary | ICD-10-CM

## 2022-10-04 DIAGNOSIS — R26.9 GAIT DISTURBANCE: ICD-10-CM

## 2022-10-04 PROCEDURE — 97112 NEUROMUSCULAR REEDUCATION: CPT

## 2022-10-04 PROCEDURE — 97530 THERAPEUTIC ACTIVITIES: CPT

## 2022-10-04 NOTE — PROGRESS NOTES
[address] [facility]   Phone: [phone]   Fax: [phone]      Physical Therapy Treatment Note    Date: 10/4/2022  Patient Name: Irineo Joseph  : 1980   MRN: [id number]  DOInjury: 2022   DOSx: 2022  Referring Provider: Areli Payne MD  [address]     Medical Diagnosis:   Surgery: Right knee arthroscopy, medial meniscus root repair  Date: 22   Surgery: Right knee arthroscopy, medial meniscus root repair  Date: 22  Evaluate and treat range of motion, strengthening, modalities per postoperative protocol  Z98.890 (ICD-10-CM) - Status post arthroscopy of right knee    Outcome Measure: LEFS 87.5% Disability    S: The pt reports her knee feels as good as it has since taking off her brace. O: Please refer to PT Eval       2022 PT removed steri-stripes for arthroscopy incisions. PT had orthopedic physician's office observe surgical site & cleared pt. Time 1204-3977     Visit 10/12 Repeat outcome measure at mid point and end. Pain      Strength      Palpation      ROM      Modalities            Manual            Exercise            NR   NR   NR   NR   NR   NR   TE    TE   On mat TE   Purple band TE   NR   NR   NR                     Standing SLRs  x30 Fwd/lat/bwd TA   Standing hs curls x30  TA   Standing marches x30  TA   Standing HRs x30  TA         Standing resisted step-outs  -flex/ext/abd/add x30 YTB NR         Standing TKEs x30 YTB NR         Step-ups  -fwd  -lat  - RLE remaninig on step and step down c LLE  X20 each 4\" TA               A:  Adjusted the pt's HEP to include more standing activities, the pt tolerated all movement well, had some posterior knee soreness as the session continued but this was minimal and expected with increased standing activity the pt has performed lately. P: Continue with rehab plan by following orthopedic surgeon's protocol. Stephani Dickens.  Eleno , PT  License number:  [id number]    Treatment Charges: Mins Units   Initial Evaluation     Re-Evaluation     Ther Exercise         TE     Manual Therapy     MT     Ther Activities        TA 29 2   Gait Training          GT     Neuro Re-education NR 12 1   Modalities     Non-Billable Service Time     Other     Total Time/Units 41 3

## 2022-10-06 ENCOUNTER — TREATMENT (OUTPATIENT)
Dept: PHYSICAL THERAPY | Age: 42
End: 2022-10-06
Payer: COMMERCIAL

## 2022-10-06 DIAGNOSIS — M25.562 ACUTE PAIN OF LEFT KNEE: ICD-10-CM

## 2022-10-06 DIAGNOSIS — R26.9 GAIT DISTURBANCE: ICD-10-CM

## 2022-10-06 DIAGNOSIS — R26.2 DIFFICULTY WALKING: Primary | ICD-10-CM

## 2022-10-06 PROCEDURE — 97530 THERAPEUTIC ACTIVITIES: CPT | Performed by: PHYSICAL THERAPIST

## 2022-10-06 PROCEDURE — 97112 NEUROMUSCULAR REEDUCATION: CPT | Performed by: PHYSICAL THERAPIST

## 2022-10-06 NOTE — PROGRESS NOTES
[address] [facility]   Phone: [phone]   Fax: [phone]      Physical Therapy Treatment Note    Date: 10/6/2022  Patient Name: Jeni Valera  : 1980   MRN: [id number]  DOInjury: 2022   DOSx: 2022  Referring Provider: Star Esteban, APRN - CNP  [address]     Medical Diagnosis:   Surgery: Right knee arthroscopy, medial meniscus root repair  Date: 22   Surgery: Right knee arthroscopy, medial meniscus root repair  Date: 22  Evaluate and treat range of motion, strengthening, modalities per postoperative protocol  Z98.890 (ICD-10-CM) - Status post arthroscopy of right knee    Outcome Measure: LEFS 87.5% Disability    S: The pt presents with no new complaints today to PT.      O: Please refer to PT Eval       2022 PT removed steri-stripes for arthroscopy incisions. PT had orthopedic physician's office observe surgical site & cleared pt. Time 0797-0841     Visit  Repeat outcome measure at mid point and end. RLE SLS heel raises 3x 10  NR   Band TKE flex x30 blue NR   Band TKE ext x50 blue NR   Step-squat RLE SLS 3x 10 4'' NR   Step-up forward with RLE remaining on step x30 6'' TA   Step-ups R & L lateral x30 6'' TA   Step-downs RLE remaining on step x30 4'' TA   Band step-outs forward, lateral, medial, & backward X30 each blue TA   Mini-squats  x30  TA   X-band AMB forward, backward, & R & L lateral X30 each Dark x-band NR                                                                                                                                 A:  The pt progressed with today's Tx session to perform new exercises step-ups, x-band, band step-outs & TKE, & RLE SLS. P: Continue with rehab plan by following orthopedic surgeon's protocol.      Javid Mckeon, PT OHPT 95061    Treatment Charges: Mins Units   Initial Evaluation     Re-Evaluation     Ther Exercise         TE     Manual Therapy     MT Ther Activities        TA 30 2   Gait Training          GT     Neuro Re-education NR 30 2   Modalities     Non-Billable Service Time     Other     Total Time/Units 60 4

## 2022-10-11 ENCOUNTER — TREATMENT (OUTPATIENT)
Dept: PHYSICAL THERAPY | Age: 42
End: 2022-10-11
Payer: COMMERCIAL

## 2022-10-11 DIAGNOSIS — R26.9 GAIT DISTURBANCE: ICD-10-CM

## 2022-10-11 DIAGNOSIS — R26.2 DIFFICULTY WALKING: Primary | ICD-10-CM

## 2022-10-11 PROCEDURE — 97112 NEUROMUSCULAR REEDUCATION: CPT | Performed by: PHYSICAL THERAPIST

## 2022-10-11 PROCEDURE — 97530 THERAPEUTIC ACTIVITIES: CPT | Performed by: PHYSICAL THERAPIST

## 2022-10-11 NOTE — PROGRESS NOTES
[address] [facility] [address] [facility]   Phone: [phone]   Fax: [phone]    Re-evaluation    Date:  10/11/2022   Patient: Jeni Valera               : 1980                        MRN: [id number]  Referring Provider: RODOLFO Chatman - CNP  [address] [facility]                                 Medical Diagnosis:      K21.161 (ICD-10-CM) - Status post arthroscopy of right knee     Surgery: Right knee arthroscopy, medial meniscus root repair  Date: 22        Evaluate and treat range of motion, strengthening, modalities per postoperative protocol     Surgery: Right knee arthroscopy, medial meniscus root repair  Date: 53        CERTIFICATION PERIOD:  2022 to 10/11/2022    ATTENDANCE:  Patient has attended 13 of 13 scheduled treatments from 2022  to 10/11/2022. TREATMENTS RECEIVED:  Therapeutic activity, Therapeutic exercise, neuromuscular reeducation, HEP    INITIAL STATUS:  Observations: well nourished female     Inspection: surgical incisions right knee arthroscopy healing well     Edema: Mod edema RLE knee & lower leg     Gait: ambulates with crutches, RLE knee brace locked at ext, & RLE NWB     Joint/Motion:     Knee:  Right:   AROM: 73° Flexion,  -3° Extension        Muscle Length Testing: Moderate+ restrictions with RLE hamstring flexibility                Strength:     Knee:   Right: Hip: 3/5, Knee: 3-/5     Palpation: no abnormal tenderness present         Special test comments: NA due to recent surgery      CURRENT STATUS:  AROM right knee flex WNL & ext [facility]. Strength: RLE MMT knee flex & ext 4+/5, hip flex, ext, abd, & add 4+/5  SFMA: Squat Dysfunction & Painful  & RLE SLS Dysfunction & Painful    OUTCOME MEASURE: LEFS 43.75% Disability    COMMENTS AND RECOMMENDATIONS: The pt has progressed with PT Rehab program to achieve improvements with arom, strength, function, & ability to AMB.  The pt continues to present with instability at RLE & inability to run. Therefore, I recommend that the pt requires continuation of the skilled services of outpt PT Rehab to achieve LTGs, restore & resolve her deficits & limitations, & facilitate return to prior level of function/activity. Thank you for the opportunity to work with your patient. Analia Fountain, PT OHPT [id number]    I CERTIFY THAT THE ABOVE REASSESSMENT AND PLAN OF CARE FOR PHYSICAL THERAPY SERVICES ARE APPROPRIATE AND MEDICALLY NECESSARY.     Duration: From 10/11/2022 thru 12/11/2022    ________________________                _______________  Physician     Date

## 2022-10-11 NOTE — PROGRESS NOTES
[address] [facility]   Phone: [phone]   Fax: [phone]      Physical Therapy Treatment Note    Date: 10/11/2022  Patient Name: Yevgeniy Ricks  : 1980   MRN: [id number]  DOInjury: 2022   DOSx: 2022  Referring Provider: Rafita Garcia MD  [address]     Medical Diagnosis:   Surgery: Right knee arthroscopy, medial meniscus root repair  Date: 22   Surgery: Right knee arthroscopy, medial meniscus root repair  Date: 22  Evaluate and treat range of motion, strengthening, modalities per postoperative protocol  Z98.890 (ICD-10-CM) - Status post arthroscopy of right knee    Outcome Measure: LEFS 87.5% Disability    S: The pt c/o pain & soreness at posterior right knee. .      O: Please refer to PT Eval       2022 PT removed steri-stripes for arthroscopy incisions. PT had orthopedic physician's office observe surgical site & cleared pt. Time 7865-4267     Visit  Repeat outcome measure at mid point and end. RLE SLS heel raises 3x 10  NR   Band TKE flex x30 purple NR   Band TKE ext x30 purple NR   Step-squat RLE SLS 3x 10 4'' NR   Step-up forward with RLE remaining on step x30 4''  Purple x-band TA   Step-ups R & L lateral x30 4''  Purple x-band TA   Step-downs RLE remaining on step x30 4'' TA   Band step-outs forward, lateral, medial, & backward X30 each purple TA   X-band AMB forward, backward, & R & L lateral X30 each purple x-band NR   Squats with hip abd x30 Purple band NR   Squat walk with band x20 Purple band NR                                                                                                                     A:  The pt progressed with today's Tx session to perform purple x-band, x-band band step-ups, & purple step-outs. P: Continue with rehab plan by following orthopedic surgeon's protocol.      Binta Carmichael, PT OHPT 13205    Treatment Charges: Mins Units   Initial Evaluation Re-Evaluation     Ther Exercise         TE     Manual Therapy     MT     Ther Activities        TA 30 2   Gait Training          GT     Neuro Re-education NR 30 2   Modalities     Non-Billable Service Time     Other     Total Time/Units 60 4

## 2022-10-25 ENCOUNTER — TREATMENT (OUTPATIENT)
Dept: PHYSICAL THERAPY | Age: 42
End: 2022-10-25
Payer: COMMERCIAL

## 2022-10-25 DIAGNOSIS — R26.2 DIFFICULTY WALKING: Primary | ICD-10-CM

## 2022-10-25 DIAGNOSIS — R26.9 GAIT DISTURBANCE: ICD-10-CM

## 2022-10-25 PROCEDURE — 97112 NEUROMUSCULAR REEDUCATION: CPT | Performed by: PHYSICAL THERAPIST

## 2022-10-25 NOTE — PROGRESS NOTES
[address] [facility]   Phone: [phone]   Fax: [phone]      Physical Therapy Treatment Note    Date: 10/25/2022  Patient Name: Marita Alicea  : 1980   MRN: [id number]  DOInjury: 2022   DOSx: 2022  Referring Provider: Tashia Sanderson MD  [address]     Medical Diagnosis:   Surgery: Right knee arthroscopy, medial meniscus root repair  Date: 22   Surgery: Right knee arthroscopy, medial meniscus root repair  Date: 22  Evaluate and treat range of motion, strengthening, modalities per postoperative protocol  Z98.890 (ICD-10-CM) - Status post arthroscopy of right knee    Outcome Measure: LEFS 87.5% Disability    S: The pt presents with no complaints this morning to PT. The pt reports that she has started playing basketball but not too high intense. O: Please refer to PT Eval       2022 PT removed steri-stripes for arthroscopy incisions. PT had orthopedic physician's office observe surgical site & cleared pt. Time 1561-4194     Visit  Repeat outcome measure at mid point and end. Heel raises on bosu ball x30  NR   Marching on bosu ball x30  NR   RLE SLS heel raises 3x 10  NR   Step-up forward with RLE remaining on bosu ball x30 Bosu ball  Purple x-band NR   Step-ups R & L lateral on bosu ball x30 Bosu ball  Purple x-band NR   X-band AMB forward, backward, & R & L lateral X30 each purple x-band NR   Tennis ball toss & catch 3min bosu ball  3min india-disc  NR   squats X30 bosu ball  X30 india-disc  NR   Lunges forward & R lateral onto bosu ball x30  NR                                                                                                   A:  The pt progressed with today's Tx session to perform new bosu ball & india-disc exercises for dynamic stabilization, balance, coordination, & proprioception. P: Continue with rehab plan by following orthopedic surgeon's protocol.      Eran Seth, PT OHPT 40485    Treatment Charges: Mins Units   Initial Evaluation     Re-Evaluation     Ther Exercise         TE     Manual Therapy     MT     Ther Activities        TA     Gait Training          GT     Neuro Re-education NR 40 3   Modalities     Non-Billable Service Time     Other     Total Time/Units 40 3

## 2022-10-27 ENCOUNTER — TREATMENT (OUTPATIENT)
Dept: PHYSICAL THERAPY | Age: 42
End: 2022-10-27
Payer: COMMERCIAL

## 2022-10-27 DIAGNOSIS — R26.2 DIFFICULTY WALKING: Primary | ICD-10-CM

## 2022-10-27 DIAGNOSIS — R26.9 GAIT DISTURBANCE: ICD-10-CM

## 2022-10-27 PROCEDURE — 97112 NEUROMUSCULAR REEDUCATION: CPT | Performed by: PHYSICAL THERAPIST

## 2022-10-27 NOTE — PROGRESS NOTES
[address] [facility]   Phone: [phone]   Fax: [phone]      Physical Therapy Treatment Note    Date: 10/27/2022  Patient Name: Isaías Krause  : 1980   MRN: [id number]  DOInjury: 2022   DOSx: 2022  Referring Provider: Bailey Montoya MD  [address]     Medical Diagnosis:   Surgery: Right knee arthroscopy, medial meniscus root repair  Date: 22   Surgery: Right knee arthroscopy, medial meniscus root repair  Date: 22  Evaluate and treat range of motion, strengthening, modalities per postoperative protocol  Z98.890 (ICD-10-CM) - Status post arthroscopy of right knee    Outcome Measure: LEFS 87.5% Disability    S: The pt presents with no new complaints. O: Please refer to PT Eval       2022 PT removed steri-stripes for arthroscopy incisions. PT had orthopedic physician's office observe surgical site & cleared pt. Time 2133-5203     Visit 15/20 Repeat outcome measure at mid point and end. Heel raises on bosu ball x30  NR   Marching on bosu ball x30  NR   X-band AMB forward, backward, & R & L lateral X30 each purple x-band NR   Tennis ball toss & catch 4min bosu ball  4min irish-disc  NR   squats X30 bosu ball  X30 irish-disc  NR   Lunges forward & R lateral onto bosu ball x30  NR   Irish-disc squats x30  NR   Bosu ball squats x30  NR                                                                                       A:  The pt progressed with today's Tx session to perform irish-disc & bosu ball squats. P: Continue with rehab plan by following orthopedic surgeon's protocol.      Jonathan Paci, PT [facility] [id number]    Treatment Charges: Mins Units   Initial Evaluation     Re-Evaluation     Ther Exercise         TE     Manual Therapy     MT     Ther Activities        TA     Gait Training          GT     Neuro Re-education NR 40 3   Modalities     Non-Billable Service Time     Other     Total Time/Units 40 3

## 2022-11-03 ENCOUNTER — TREATMENT (OUTPATIENT)
Dept: PHYSICAL THERAPY | Age: 42
End: 2022-11-03
Payer: COMMERCIAL

## 2022-11-03 DIAGNOSIS — R26.9 GAIT DISTURBANCE: ICD-10-CM

## 2022-11-03 DIAGNOSIS — R26.2 DIFFICULTY WALKING: Primary | ICD-10-CM

## 2022-11-03 PROCEDURE — 97112 NEUROMUSCULAR REEDUCATION: CPT | Performed by: PHYSICAL THERAPIST

## 2022-11-03 NOTE — PROGRESS NOTES
[address] [facility]   Phone: [phone]   Fax: [phone]      Physical Therapy Treatment Note    Date: 11/3/2022  Patient Name: Josue Rogers  : 1980   MRN: [id number]  DOInjury: 2022   DOSx: 2022  Referring Provider: Karlos Cedeño MD  [address]     Medical Diagnosis:   Surgery: Right knee arthroscopy, medial meniscus root repair  Date: 22   Surgery: Right knee arthroscopy, medial meniscus root repair  Date: 22  Evaluate and treat range of motion, strengthening, modalities per postoperative protocol  Z98.890 (ICD-10-CM) - Status post arthroscopy of right knee    Outcome Measure: LEFS 87.5% Disability    S: The pt AMB into outpt clinic without any deviations & without complaints. O: Please refer to PT Eval       2022 PT removed steri-stripes for arthroscopy incisions. PT had orthopedic physician's office observe surgical site & cleared pt. Time 6890-5785     Visit 15/20 Repeat outcome measure at mid point and end. Heel raises on bosu ball x30  NR   Marching on bosu ball x30  NR   X-band AMB forward, backward, & R & L lateral X30 each purple x-band NR   Tennis ball toss & catch 6min bosu ball  6min india-disc  NR   squats X30 bosu ball  X30 india-disc  NR   Lunges forward & R lateral onto india-disc X30 each  NR   RLE SLS dead-lift x30  NR   RLE SLS dead-lift with trunk rotation x30  NR                                                                                       A:  The pt progressed with today's Tx session to perform india-disc lunges & SLS dead-lifts. P: Continue with rehab plan by following orthopedic surgeon's protocol.      Sergey Callahan, PT [facility] [id number]    Treatment Charges: Mins Units   Initial Evaluation     Re-Evaluation     Ther Exercise         TE     Manual Therapy     MT     Ther Activities        TA     Gait Training          GT     Neuro Re-education NR 40 3   Modalities Non-Billable Service Time     Other     Total Time/Units 40 3

## 2022-11-08 ENCOUNTER — TREATMENT (OUTPATIENT)
Dept: PHYSICAL THERAPY | Age: 42
End: 2022-11-08
Payer: COMMERCIAL

## 2022-11-08 DIAGNOSIS — R26.2 DIFFICULTY WALKING: Primary | ICD-10-CM

## 2022-11-08 DIAGNOSIS — R26.9 GAIT DISTURBANCE: ICD-10-CM

## 2022-11-08 PROCEDURE — 97112 NEUROMUSCULAR REEDUCATION: CPT | Performed by: PHYSICAL THERAPIST

## 2022-11-08 NOTE — PROGRESS NOTES
[address] [facility]   Phone: [phone]   Fax: [phone]      Physical Therapy Treatment Note    Date: 2022  Patient Name: Yogesh Kang  : 1980   MRN: [id number]  DOInjury: 2022   DOSx: 2022  Referring Provider: Donalynn Bence, MD  [address]     Medical Diagnosis:   Surgery: Right knee arthroscopy, medial meniscus root repair  Date: 22   Surgery: Right knee arthroscopy, medial meniscus root repair  Date: 22  Evaluate and treat range of motion, strengthening, modalities per postoperative protocol  Z98.890 (ICD-10-CM) - Status post arthroscopy of right knee    Outcome Measure: LEFS 87.5% Disability    S: The pt continues to c/o right knee pain while kneeling. O: Please refer to PT Eval       2022 PT removed steri-stripes for arthroscopy incisions. PT had orthopedic physician's office observe surgical site & cleared pt. Time 5853-6124     Visit  Repeat outcome measure at mid point and end. Step-squat RLE SLS 3x 10 6'' NR   Step-up forward with RLE remaining on step x30 8''  Purple x-band NR   Step-ups R & L lateral  x30 8''  Purple x-band NR   Step-downs RLE remaining on step x30 4''  Purple X-Band TA   X-band AMB forward, backward, & R & L lateral X30 each purple x-band NR   Tennis ball toss & catch 6min bosu ball  6min india-disc  4min each way on rocker board  NR   squats X30 bosu ball  X30 india-disc  X30 rocker board each way  NR   Steam-Boats RLE SLS with LLE SLR hip flex, ext, abd, & add X30 each Blue band NR                                                                                                                                             A:  The pt progressed to perform steam-boats, x-band step-ups, & rocker board with today's Tx session. P: Continue with rehab plan by following orthopedic surgeon's protocol.      Maile Garcia, [address]    Treatment Charges: Mins Units Initial Evaluation     Re-Evaluation     Ther Exercise         TE     Manual Therapy     MT     Ther Activities        TA     Gait Training          GT     Neuro Re-education NR 60 4   Modalities     Non-Billable Service Time     Other     Total Time/Units 60 4

## 2022-11-09 ENCOUNTER — OFFICE VISIT (OUTPATIENT)
Dept: ORTHOPEDIC SURGERY | Age: 42
End: 2022-11-09

## 2022-11-09 DIAGNOSIS — Z98.890 STATUS POST ARTHROSCOPY OF RIGHT KNEE: Primary | ICD-10-CM

## 2022-11-09 DIAGNOSIS — M25.561 ACUTE PAIN OF RIGHT KNEE: ICD-10-CM

## 2022-11-09 PROCEDURE — 99024 POSTOP FOLLOW-UP VISIT: CPT | Performed by: ORTHOPAEDIC SURGERY

## 2022-11-09 NOTE — PROGRESS NOTES
Follow Up Post Operative Visit     Surgery: Right knee arthroscopy, medial meniscus root repair  Date: 8/9/22    Subjective:    Abena Bardley is here for follow up visit s/p above procedure. She is doing well. She has been progressing with physical therapy doing very well. She reports symptoms have improved. Patient is requesting release to all activities today    Controlled Substances Monitoring:        Physical Exam:    No data recorded    General: Alert and oriented x3, no acute distress  Cardiovascular/pulmonary: No labored breathing, peripheral perfusion intact  Musculoskeletal:    Exam of the right knee shows full range of motion, stable valgus varus stress at 0 and 30 degrees. Posterior drawer and Lachman intact. Negative swelling or joint line tenderness. Stable tracks midline. Stable knee on exam.      Imaging: No new imaging obtained today. Previous imaging reviewed    Assessment and Plan: Status post right knee arthroscopy medial meniscus root repair x3 months out    Patient doing very well from procedure listed above. She has made very good progress with outpatient therapy. At this point she can transition to home exercise independently. She can begin weaning herself back into all activities without restrictions. She will follow-up as needed.       RODOLFO Reyes-CNP  Orthopedic Surgery   11/09/22  5:20 PM

## 2024-02-03 ENCOUNTER — HOSPITAL ENCOUNTER (EMERGENCY)
Age: 44
Discharge: HOME | End: 2024-02-03
Payer: COMMERCIAL

## 2024-02-03 VITALS
HEART RATE: 86 BPM | TEMPERATURE: 98.4 F | BODY MASS INDEX: 24.8 KG/M2 | WEIGHT: 140 LBS | HEIGHT: 63 IN | RESPIRATION RATE: 15 BRPM | DIASTOLIC BLOOD PRESSURE: 92 MMHG | SYSTOLIC BLOOD PRESSURE: 137 MMHG | OXYGEN SATURATION: 100 %

## 2024-02-03 DIAGNOSIS — K08.89 PAIN, DENTAL: Primary | ICD-10-CM

## 2024-02-03 PROCEDURE — 99284 EMERGENCY DEPT VISIT MOD MDM: CPT

## 2024-02-03 PROCEDURE — 6360000002 HC RX W HCPCS: Performed by: PHYSICIAN ASSISTANT

## 2024-02-03 PROCEDURE — 96372 THER/PROPH/DIAG INJ SC/IM: CPT

## 2024-02-03 PROCEDURE — 6370000000 HC RX 637 (ALT 250 FOR IP): Performed by: PHYSICIAN ASSISTANT

## 2024-02-03 RX ORDER — CHLORHEXIDINE GLUCONATE ORAL RINSE 1.2 MG/ML
15 SOLUTION DENTAL 2 TIMES DAILY
Qty: 420 ML | Refills: 0 | Status: SHIPPED | OUTPATIENT
Start: 2024-02-03 | End: 2024-02-17

## 2024-02-03 RX ORDER — HYDROCODONE BITARTRATE AND ACETAMINOPHEN 5; 325 MG/1; MG/1
1 TABLET ORAL ONCE
Status: COMPLETED | OUTPATIENT
Start: 2024-02-03 | End: 2024-02-03

## 2024-02-03 RX ORDER — HYDROCODONE BITARTRATE AND ACETAMINOPHEN 5; 325 MG/1; MG/1
1 TABLET ORAL EVERY 6 HOURS PRN
Qty: 12 TABLET | Refills: 0 | Status: SHIPPED | OUTPATIENT
Start: 2024-02-03 | End: 2024-02-06

## 2024-02-03 RX ORDER — KETOROLAC TROMETHAMINE 30 MG/ML
30 INJECTION, SOLUTION INTRAMUSCULAR; INTRAVENOUS ONCE
Status: COMPLETED | OUTPATIENT
Start: 2024-02-03 | End: 2024-02-03

## 2024-02-03 RX ADMIN — HYDROCODONE BITARTRATE AND ACETAMINOPHEN 1 TABLET: 5; 325 TABLET ORAL at 15:30

## 2024-02-03 RX ADMIN — KETOROLAC TROMETHAMINE 30 MG: 30 INJECTION, SOLUTION INTRAMUSCULAR; INTRAVENOUS at 15:30

## 2024-02-03 ASSESSMENT — PAIN DESCRIPTION - PAIN TYPE: TYPE: ACUTE PAIN

## 2024-02-03 ASSESSMENT — LIFESTYLE VARIABLES
HOW MANY STANDARD DRINKS CONTAINING ALCOHOL DO YOU HAVE ON A TYPICAL DAY: PATIENT DOES NOT DRINK
HOW OFTEN DO YOU HAVE A DRINK CONTAINING ALCOHOL: NEVER

## 2024-02-03 ASSESSMENT — PAIN DESCRIPTION - ORIENTATION: ORIENTATION: RIGHT;LOWER

## 2024-02-03 ASSESSMENT — PAIN SCALES - GENERAL
PAINLEVEL_OUTOF10: 10
PAINLEVEL_OUTOF10: 10

## 2024-02-03 ASSESSMENT — PAIN - FUNCTIONAL ASSESSMENT: PAIN_FUNCTIONAL_ASSESSMENT: 0-10

## 2024-02-03 ASSESSMENT — PAIN DESCRIPTION - LOCATION: LOCATION: TEETH

## 2024-02-03 ASSESSMENT — PAIN DESCRIPTION - DESCRIPTORS: DESCRIPTORS: ACHING;DISCOMFORT

## 2024-02-03 ASSESSMENT — PAIN DESCRIPTION - FREQUENCY: FREQUENCY: CONTINUOUS

## 2024-02-03 NOTE — ED PROVIDER NOTES
[facility] Visit.      Department of Emergency Medicine   ED  Provider Note  Admit Date/RoomTime: 2/3/2024  2:36 PM  ED Room: 37/37    CHIEF COMPLAINT:   Chief Complaint   Patient presents with    Dental Pain     right lower dental pain intermittent x\"couple months\" with worsening since Thursday, states has appt with dentist 2/8     ---------------------------------HISTORY OF PRESENT ILLNESS-----------------------------------     Pam Cyr is a 44 y.o. female presenting to the ED for dental pain that has been ongoing for the past couple months.  She states the pain is intermittent.  It has worsened since this past Thursday.  She does have an appointment with her dentist this coming week but was unable to deal with the pain.  She states Tylenol and Motrin are not helping.  She denies any difficulty with breathing, swallowing, or handling her secretions.  Patient is currently taking Keflex 500 mg 3 times a day due to a recent 6 skin lesion removal to her right wrist.  She is denying any fever/chills, headache, dizziness, neck pain, chest pain, shortness of breath, pain with breathing, abdominal pain.  She denies any trauma or injury.  Patient is alert and oriented x 3 and in no apparent distress at this exam.  She is nontoxic-appearing.    PCP: No primary care provider on file.  Dentist: None    Review of Systems:   Pertinent positives and negatives are stated within HPI, all other systems reviewed and are negative.    --------------------------------------------- PAST HISTORY ---------------------------------------------    Past Medical History:  has a past medical history of ADHD, Depression, PONV (postoperative nausea and vomiting), and Tear of left meniscus as current injury.    Past Surgical History:  has a past surgical history that includes Anterior cruciate ligament repair (Left, 1998); Dilation and curettage of uterus (2012); Carpal tunnel release (Right, 4/2015); Rotator cuff repair (6/10/15);

## 2024-08-21 ENCOUNTER — OFFICE VISIT (OUTPATIENT)
Dept: ORTHOPEDIC SURGERY | Age: 44
End: 2024-08-21
Payer: COMMERCIAL

## 2024-08-21 VITALS — HEIGHT: 63 IN | WEIGHT: 145 LBS | BODY MASS INDEX: 25.69 KG/M2

## 2024-08-21 DIAGNOSIS — Z98.890 STATUS POST ARTHROSCOPY OF RIGHT KNEE: ICD-10-CM

## 2024-08-21 DIAGNOSIS — M25.561 ACUTE PAIN OF RIGHT KNEE: Primary | ICD-10-CM

## 2024-08-21 PROCEDURE — 99214 OFFICE O/P EST MOD 30 MIN: CPT | Performed by: ORTHOPAEDIC SURGERY

## 2024-08-21 PROCEDURE — G8427 DOCREV CUR MEDS BY ELIG CLIN: HCPCS | Performed by: ORTHOPAEDIC SURGERY

## 2024-08-21 PROCEDURE — G8419 CALC BMI OUT NRM PARAM NOF/U: HCPCS | Performed by: ORTHOPAEDIC SURGERY

## 2024-08-21 PROCEDURE — 4004F PT TOBACCO SCREEN RCVD TLK: CPT | Performed by: ORTHOPAEDIC SURGERY

## 2024-08-21 NOTE — PROGRESS NOTES
pulses palpable.  Normal Capillary refill   Respiratory: breathing unlabored, chest expansion symmetric   Skin: no rash, no open wounds, no erythema  Psych: normal affect; mood stable  Neurologic: gait normal, sensation grossly intact in extremities  MSK:        Lower Extremity:   Ipsilateral hip exam shows normal range of motion without pain with impingement testing.      Exam of the knee today shows no joint line tenderness.  Range of motion is full.  Rodo positive for pain lateral and posterior joint line tenderness present.  No effusion noted.  Ligamentous exam is stable           IMAGING:    XR: 4 views of the right knee show no acute abnormality      ASSESSMENT  Right knee pain 2 years out from medial meniscus root repair    PLAN  We discussed her knee today.  At this point I would like to obtain a new MRI given her history of root repair to rule out new meniscal injury versus other intra-articular derangement.  She is in agreement.  I will call her with results of MRI to determine treatment options moving forward.        Dylan Figueredo MD  Orthopaedic Surgery   8/21/24  10:05 AM

## 2024-09-09 ENCOUNTER — HOSPITAL ENCOUNTER (OUTPATIENT)
Dept: MRI IMAGING | Age: 44
Discharge: HOME | End: 2024-09-11
Attending: ORTHOPAEDIC SURGERY
Payer: COMMERCIAL

## 2024-09-09 DIAGNOSIS — M25.561 ACUTE PAIN OF RIGHT KNEE: ICD-10-CM

## 2024-09-09 DIAGNOSIS — Z98.890 STATUS POST ARTHROSCOPY OF RIGHT KNEE: ICD-10-CM

## 2024-09-09 PROCEDURE — 73721 MRI JNT OF LWR EXTRE W/O DYE: CPT

## 2024-09-14 ENCOUNTER — APPOINTMENT (OUTPATIENT)
Dept: GENERAL RADIOLOGY | Age: 44
End: 2024-09-14
Payer: COMMERCIAL

## 2024-09-14 ENCOUNTER — HOSPITAL ENCOUNTER (EMERGENCY)
Age: 44
Discharge: HOME | End: 2024-09-14
Payer: COMMERCIAL

## 2024-09-14 VITALS
OXYGEN SATURATION: 100 % | HEART RATE: 76 BPM | TEMPERATURE: 98.9 F | HEIGHT: 63 IN | DIASTOLIC BLOOD PRESSURE: 85 MMHG | BODY MASS INDEX: 24.8 KG/M2 | WEIGHT: 140 LBS | RESPIRATION RATE: 16 BRPM | SYSTOLIC BLOOD PRESSURE: 126 MMHG

## 2024-09-14 DIAGNOSIS — M77.8 FOREARM TENDONITIS: ICD-10-CM

## 2024-09-14 DIAGNOSIS — S63.501A SPRAIN OF RIGHT WRIST, INITIAL ENCOUNTER: Primary | ICD-10-CM

## 2024-09-14 PROCEDURE — 73130 X-RAY EXAM OF HAND: CPT

## 2024-09-14 PROCEDURE — 6370000000 HC RX 637 (ALT 250 FOR IP): Performed by: PHYSICIAN ASSISTANT

## 2024-09-14 PROCEDURE — 73110 X-RAY EXAM OF WRIST: CPT

## 2024-09-14 PROCEDURE — 99283 EMERGENCY DEPT VISIT LOW MDM: CPT

## 2024-09-14 PROCEDURE — 73090 X-RAY EXAM OF FOREARM: CPT

## 2024-09-14 RX ORDER — NAPROXEN 250 MG/1
500 TABLET ORAL ONCE
Status: COMPLETED | OUTPATIENT
Start: 2024-09-14 | End: 2024-09-14

## 2024-09-14 RX ORDER — NAPROXEN 500 MG/1
500 TABLET ORAL 2 TIMES DAILY
Qty: 60 TABLET | Refills: 0 | Status: SHIPPED | OUTPATIENT
Start: 2024-09-14

## 2024-09-14 RX ADMIN — NAPROXEN 500 MG: 250 TABLET ORAL at 10:58

## 2024-09-14 ASSESSMENT — PAIN - FUNCTIONAL ASSESSMENT: PAIN_FUNCTIONAL_ASSESSMENT: 0-10

## 2024-09-14 ASSESSMENT — PAIN SCALES - GENERAL: PAINLEVEL_OUTOF10: 7

## 2024-09-14 ASSESSMENT — PAIN DESCRIPTION - ORIENTATION: ORIENTATION: RIGHT

## 2024-09-14 ASSESSMENT — PAIN DESCRIPTION - LOCATION: LOCATION: WRIST

## 2024-09-30 ENCOUNTER — OFFICE VISIT (OUTPATIENT)
Dept: ORTHOPEDIC SURGERY | Age: 44
End: 2024-09-30
Payer: COMMERCIAL

## 2024-09-30 VITALS — WEIGHT: 140 LBS | BODY MASS INDEX: 24.8 KG/M2 | HEIGHT: 63 IN

## 2024-09-30 DIAGNOSIS — Z98.890 STATUS POST ARTHROSCOPY OF RIGHT KNEE: ICD-10-CM

## 2024-09-30 DIAGNOSIS — M25.561 ACUTE PAIN OF RIGHT KNEE: Primary | ICD-10-CM

## 2024-09-30 PROCEDURE — G8427 DOCREV CUR MEDS BY ELIG CLIN: HCPCS | Performed by: ORTHOPAEDIC SURGERY

## 2024-09-30 PROCEDURE — G8420 CALC BMI NORM PARAMETERS: HCPCS | Performed by: ORTHOPAEDIC SURGERY

## 2024-09-30 PROCEDURE — 99213 OFFICE O/P EST LOW 20 MIN: CPT | Performed by: ORTHOPAEDIC SURGERY

## 2024-09-30 PROCEDURE — 4004F PT TOBACCO SCREEN RCVD TLK: CPT | Performed by: ORTHOPAEDIC SURGERY

## 2024-09-30 NOTE — PROGRESS NOTES
[facility]   ORTHOPAEDIC SURGERY AND SPORTS MEDICINE  DATE OF VISIT: 09/30/24  Follow Up Visit     CHIEF COMPLAINT:   Chief Complaint   Patient presents with    Follow-up     Right knee pain 2 years out from medial meniscus root repair      Results     Right knee MRI review    Pain     Rt knee 5 / 10       HPI:    Pam Cyr is a 44 y.o. year old female who presented to the office today for follow up of right knee pain, h/o medial meniscus root repair, previously evaluated on 8/21/2024.  She recently had an MRI that she is here to review.  She reports overall minimal pain in the knee at this point      REVIEW OF SYSTEMS:     Constitutional:  Negative for weight loss, fevers, chills, fatigue  Cardiovascular: Negative for chest pain, palpitations  Pulmonary: Negative for shortness of breath, labored breathing, cough  GI: negative for abdominal pain, nausea, vomiting   MSK: per HPI  Skin: negative for rash, open wounds    All other systems reviewed and are negative       Physical Exam:     Height: 1.6 m (5' 3\"), Weight - Scale: 63.5 kg (140 lb)    General: Alert and oriented x3, no acute distress  Cardiovascular/pulmonary: No labored breathing, peripheral perfusion intact  Musculoskeletal:    Exam of the knee today shows no joint line tenderness.  Range of motion full.  No effusion.  Knee is stable.    Controlled Substances Monitoring:      Imaging:  MRI reviewed showing concern for new tear of the medial meniscus more medial to her previous root repair.  Root appears to be intact.  Articular cartilage appears near normal      Assessment: Left knee pain, history of root repair, possible new medial meniscus tear      Plan:   We discussed her knee today.  Discussed treatment options.  Currently she is not having much pain, only intermittent.  She is not interested in any further treatment.  She continues to do lunges and squats.  She will continue with activities as tolerated.  We will see her back as

## 2025-02-04 ENCOUNTER — HOSPITAL ENCOUNTER (EMERGENCY)
Age: 45
Discharge: HOME | End: 2025-02-04
Attending: STUDENT IN AN ORGANIZED HEALTH CARE EDUCATION/TRAINING PROGRAM
Payer: COMMERCIAL

## 2025-02-04 ENCOUNTER — APPOINTMENT (OUTPATIENT)
Dept: CT IMAGING | Age: 45
End: 2025-02-04
Payer: COMMERCIAL

## 2025-02-04 VITALS
WEIGHT: 140 LBS | TEMPERATURE: 98.1 F | HEART RATE: 64 BPM | SYSTOLIC BLOOD PRESSURE: 166 MMHG | OXYGEN SATURATION: 100 % | HEIGHT: 63 IN | RESPIRATION RATE: 16 BRPM | DIASTOLIC BLOOD PRESSURE: 82 MMHG | BODY MASS INDEX: 24.8 KG/M2

## 2025-02-04 DIAGNOSIS — N30.01 ACUTE CYSTITIS WITH HEMATURIA: Primary | ICD-10-CM

## 2025-02-04 DIAGNOSIS — D25.9 UTERINE LEIOMYOMA, UNSPECIFIED LOCATION: ICD-10-CM

## 2025-02-04 DIAGNOSIS — R30.0 DYSURIA: ICD-10-CM

## 2025-02-04 DIAGNOSIS — R10.9 ABDOMINAL PAIN, UNSPECIFIED ABDOMINAL LOCATION: ICD-10-CM

## 2025-02-04 LAB
ALBUMIN SERPL-MCNC: 4.5 G/DL (ref 3.5–5.2)
ALP SERPL-CCNC: 75 U/L (ref 35–104)
ALT SERPL-CCNC: 40 U/L (ref 0–32)
ANION GAP SERPL CALCULATED.3IONS-SCNC: 12 MMOL/L (ref 7–16)
AST SERPL-CCNC: 25 U/L (ref 0–31)
BACTERIA URNS QL MICRO: ABNORMAL
BASOPHILS # BLD: 0.01 K/UL (ref 0–0.2)
BASOPHILS NFR BLD: 0 % (ref 0–2)
BILIRUB SERPL-MCNC: 0.4 MG/DL (ref 0–1.2)
BILIRUB UR QL STRIP: NEGATIVE
BUN SERPL-MCNC: 12 MG/DL (ref 6–20)
CALCIUM SERPL-MCNC: 9.1 MG/DL (ref 8.6–10.2)
CASTS #/AREA URNS LPF: ABNORMAL /LPF
CHLORIDE SERPL-SCNC: 104 MMOL/L (ref 98–107)
CLARITY UR: ABNORMAL
CO2 SERPL-SCNC: 23 MMOL/L (ref 22–29)
COLOR UR: YELLOW
CREAT SERPL-MCNC: 0.8 MG/DL (ref 0.5–1)
EOSINOPHIL # BLD: 0.09 K/UL (ref 0.05–0.5)
EOSINOPHILS RELATIVE PERCENT: 2 % (ref 0–6)
EPI CELLS #/AREA URNS HPF: ABNORMAL /HPF
ERYTHROCYTE [DISTWIDTH] IN BLOOD BY AUTOMATED COUNT: 12.7 % (ref 11.5–15)
GFR, ESTIMATED: >90 ML/MIN/1.73M2
GLUCOSE SERPL-MCNC: 113 MG/DL (ref 74–99)
GLUCOSE UR STRIP-MCNC: NEGATIVE MG/DL
HCG, URINE, POC: NEGATIVE
HCT VFR BLD AUTO: 38.6 % (ref 34–48)
HGB BLD-MCNC: 12.1 G/DL (ref 11.5–15.5)
HGB UR QL STRIP.AUTO: ABNORMAL
IMM GRANULOCYTES # BLD AUTO: <0.03 K/UL (ref 0–0.58)
IMM GRANULOCYTES NFR BLD: 0 % (ref 0–5)
KETONES UR STRIP-MCNC: NEGATIVE MG/DL
LEUKOCYTE ESTERASE UR QL STRIP: ABNORMAL
LYMPHOCYTES NFR BLD: 1.98 K/UL (ref 1.5–4)
LYMPHOCYTES RELATIVE PERCENT: 37 % (ref 20–42)
Lab: NORMAL
MCH RBC QN AUTO: 29.5 PG (ref 26–35)
MCHC RBC AUTO-ENTMCNC: 31.3 G/DL (ref 32–34.5)
MCV RBC AUTO: 94.1 FL (ref 80–99.9)
MONOCYTES NFR BLD: 0.33 K/UL (ref 0.1–0.95)
MONOCYTES NFR BLD: 6 % (ref 2–12)
NEGATIVE QC PASS/FAIL: NORMAL
NEUTROPHILS NFR BLD: 55 % (ref 43–80)
NEUTS SEG NFR BLD: 2.94 K/UL (ref 1.8–7.3)
NITRITE UR QL STRIP: NEGATIVE
PH UR STRIP: 6 [PH] (ref 5–8)
PLATELET # BLD AUTO: 272 K/UL (ref 130–450)
PMV BLD AUTO: 10.9 FL (ref 7–12)
POSITIVE QC PASS/FAIL: NORMAL
POTASSIUM SERPL-SCNC: 4 MMOL/L (ref 3.5–5)
PROT SERPL-MCNC: 7.9 G/DL (ref 6.4–8.3)
PROT UR STRIP-MCNC: 30 MG/DL
RBC # BLD AUTO: 4.1 M/UL (ref 3.5–5.5)
RBC #/AREA URNS HPF: ABNORMAL /HPF
SODIUM SERPL-SCNC: 139 MMOL/L (ref 132–146)
SP GR UR STRIP: >1.03 (ref 1–1.03)
UROBILINOGEN UR STRIP-ACNC: 0.2 EU/DL (ref 0–1)
WBC #/AREA URNS HPF: ABNORMAL /HPF
WBC OTHER # BLD: 5.4 K/UL (ref 4.5–11.5)

## 2025-02-04 PROCEDURE — 85025 COMPLETE CBC W/AUTO DIFF WBC: CPT

## 2025-02-04 PROCEDURE — 74177 CT ABD & PELVIS W/CONTRAST: CPT

## 2025-02-04 PROCEDURE — 6360000004 HC RX CONTRAST MEDICATION: Performed by: RADIOLOGY

## 2025-02-04 PROCEDURE — 81001 URINALYSIS AUTO W/SCOPE: CPT

## 2025-02-04 PROCEDURE — 96374 THER/PROPH/DIAG INJ IV PUSH: CPT

## 2025-02-04 PROCEDURE — 80053 COMPREHEN METABOLIC PANEL: CPT

## 2025-02-04 PROCEDURE — 99285 EMERGENCY DEPT VISIT HI MDM: CPT

## 2025-02-04 PROCEDURE — 6360000002 HC RX W HCPCS: Performed by: NURSE PRACTITIONER

## 2025-02-04 PROCEDURE — 6370000000 HC RX 637 (ALT 250 FOR IP): Performed by: NURSE PRACTITIONER

## 2025-02-04 PROCEDURE — 87086 URINE CULTURE/COLONY COUNT: CPT

## 2025-02-04 RX ORDER — NITROFURANTOIN 25; 75 MG/1; MG/1
100 CAPSULE ORAL 2 TIMES DAILY
Qty: 10 CAPSULE | Refills: 0 | Status: SHIPPED | OUTPATIENT
Start: 2025-02-04 | End: 2025-02-09

## 2025-02-04 RX ORDER — KETOROLAC TROMETHAMINE 15 MG/ML
15 INJECTION, SOLUTION INTRAMUSCULAR; INTRAVENOUS ONCE
Status: COMPLETED | OUTPATIENT
Start: 2025-02-04 | End: 2025-02-04

## 2025-02-04 RX ORDER — PHENAZOPYRIDINE HYDROCHLORIDE 100 MG/1
100 TABLET, FILM COATED ORAL 3 TIMES DAILY PRN
Qty: 6 TABLET | Refills: 0 | Status: SHIPPED | OUTPATIENT
Start: 2025-02-04

## 2025-02-04 RX ORDER — NAPROXEN 500 MG/1
500 TABLET ORAL 2 TIMES DAILY PRN
Qty: 14 TABLET | Refills: 0 | Status: SHIPPED | OUTPATIENT
Start: 2025-02-04 | End: 2025-02-11

## 2025-02-04 RX ORDER — NITROFURANTOIN 25; 75 MG/1; MG/1
100 CAPSULE ORAL ONCE
Status: COMPLETED | OUTPATIENT
Start: 2025-02-04 | End: 2025-02-04

## 2025-02-04 RX ORDER — IOPAMIDOL 755 MG/ML
75 INJECTION, SOLUTION INTRAVASCULAR
Status: COMPLETED | OUTPATIENT
Start: 2025-02-04 | End: 2025-02-04

## 2025-02-04 RX ADMIN — IOPAMIDOL 75 ML: 755 INJECTION, SOLUTION INTRAVENOUS at 13:23

## 2025-02-04 RX ADMIN — KETOROLAC TROMETHAMINE 15 MG: 15 INJECTION, SOLUTION INTRAMUSCULAR; INTRAVENOUS at 12:32

## 2025-02-04 RX ADMIN — NITROFURANTOIN MONOHYDRATE/MACROCRYSTALS 100 MG: 75; 25 CAPSULE ORAL at 16:42

## 2025-02-04 ASSESSMENT — PAIN - FUNCTIONAL ASSESSMENT: PAIN_FUNCTIONAL_ASSESSMENT: 0-10

## 2025-02-04 ASSESSMENT — PAIN SCALES - GENERAL
PAINLEVEL_OUTOF10: 9
PAINLEVEL_OUTOF10: 9

## 2025-02-04 ASSESSMENT — PAIN DESCRIPTION - DESCRIPTORS: DESCRIPTORS: DISCOMFORT

## 2025-02-04 ASSESSMENT — PAIN DESCRIPTION - LOCATION: LOCATION: ABDOMEN

## 2025-02-04 ASSESSMENT — PAIN DESCRIPTION - ORIENTATION: ORIENTATION: LOWER

## 2025-02-04 NOTE — DISCHARGE INSTRUCTIONS
CT ABDOMEN PELVIS W IV CONTRAST Additional Contrast? None   Final Result   1. No evidence of an acute intra-abdominal or pelvic process; specifically   there is no evidence of nephrolithiasis, hydronephrosis or pyelonephritis.         Stop taking the Omnicef/cefdinir antibiotic you were previously prescribed and start taking the Macrobid.  Take naproxen with food.

## 2025-02-04 NOTE — ED TRIAGE NOTES
Department of Emergency Medicine    FIRST PROVIDER TRIAGE NOTE             [facility]           2/4/25  9:57 AM EST    Date of Encounter: 2/4/25   MRN: [id number]    Vitals:    02/04/25 0957   BP: (!) 166/82   Pulse: 64   Resp: 16   Temp: 98.1 °F (36.7 °C)   TempSrc: Oral   SpO2: 100%   Weight: 63.5 kg (140 lb)   Height: 1.6 m (5' 3\")     HPI: Pam Cyr is a 45 y.o. female who presents to the ED for Urinary Tract Infection (dx with uti sat. on antibiotics, not getting better.)     Patient was started on Cefdinir     ROS: Negative for cp or sob.    Physical Exam:   Gen Appearance/Constitutional: alert  CV: regular rate     Initial Plan of Care: All treatment areas with department are currently occupied.     Plan to order/Initiate the following while awaiting opening in ED: Triage evaluation  labs.    Provider-Patient relationship only established for Provider In Triage (PIT).  Full assessment, HPI, and examination not performed, therefore, it is not yet possible to state whether or not an emergency medical condition exists.  This provider not responsible to follow or interpret any labs or testing ordered in triage.  Supervisor request for [facility] to initiate contact and input an assessment note in triage during high volume surges.     Initial Plan of Care: Initiate Treatment-Testing, Proceed toTreatment Area When Bed Available for ED Attending/MLP to Continue Care  Secondary to high volume, low staffing, and/or boarding- patient to await bed availability.    This ends my PIT-Patient relationship.  Care of patient relinquished after triage    Electronically signed by Indira Arguelles PA-C   DD: 2/4/25

## 2025-02-04 NOTE — ED PROVIDER NOTES
Independent [facility] Visit.          [facility]  Department of Emergency Medicine   ED  Encounter Note  Admit Date/RoomTime: 2025 12:04 PM  ED Room: DISPO/D01    NAME: Pam Cyr  : 1980  MRN: [id number]     Chief Complaint:  Urinary Tract Infection (dx with uti sat. on antibiotics, not getting better.)    History of Present Illness       Pam Cyr is a 45 y.o. old female who presents to the emergency department by private vehicle, for gradual onset aching, pressure pain in the periumbilical area and suprapubic area without radiation which began 25 and took Azo prior to arrival.  She reports she went to [place] urgent care and was prescribed Omnicef and has taken it since 2025 and still having symptoms.  Patient reports it feels like her insides are going to fall out and having burning on urination with pressure in the lower suprapubic region.  There has been similar episodes in the past with UTIs.  Since onset the symptoms have been persistent and gradually worsening.  The pain is associated with no additional symptoms.  The pain is aggravated by nothing in particular and relieved by none and nothing.  There has been NO back pain, chest pain, shortness of breath, fever, chills, sweating, nausea, vomiting, anorexia, weight loss, diarrhea, constipation, dark/black stools, blood in stool, blood in emesis, cloudy urine, hematuria, urinary incontinence, vaginal discharge, vaginal itching, vaginal spotting, or vaginal bleeding. No prior history of sexually transmitted disease.       ROS   Pertinent positives and negatives are stated within HPI, all other systems reviewed and are negative.    Past Medical History:  has a past medical history of ADHD, Depression, PONV (postoperative nausea and vomiting), and Tear of left meniscus as current injury.    Surgical History has a past surgical history that includes Anterior cruciate ligament repair (Left, ); Dilation

## 2025-02-05 LAB
MICROORGANISM SPEC CULT: NO GROWTH
SERVICE CMNT-IMP: NORMAL
SPECIMEN DESCRIPTION: NORMAL

## 2025-07-29 ENCOUNTER — OFFICE VISIT (OUTPATIENT)
Dept: PRIMARY CARE CLINIC | Age: 45
End: 2025-07-29
Payer: COMMERCIAL

## 2025-07-29 VITALS
WEIGHT: 157.4 LBS | DIASTOLIC BLOOD PRESSURE: 82 MMHG | RESPIRATION RATE: 18 BRPM | TEMPERATURE: 98.1 F | BODY MASS INDEX: 27.89 KG/M2 | SYSTOLIC BLOOD PRESSURE: 128 MMHG | HEIGHT: 63 IN | OXYGEN SATURATION: 98 % | HEART RATE: 71 BPM

## 2025-07-29 DIAGNOSIS — Z12.31 SCREENING MAMMOGRAM, ENCOUNTER FOR: ICD-10-CM

## 2025-07-29 DIAGNOSIS — K21.9 GASTROESOPHAGEAL REFLUX DISEASE, UNSPECIFIED WHETHER ESOPHAGITIS PRESENT: Primary | ICD-10-CM

## 2025-07-29 DIAGNOSIS — R11.0 NAUSEA: ICD-10-CM

## 2025-07-29 DIAGNOSIS — R10.9 ABDOMINAL DISCOMFORT: ICD-10-CM

## 2025-07-29 PROCEDURE — G8427 DOCREV CUR MEDS BY ELIG CLIN: HCPCS | Performed by: STUDENT IN AN ORGANIZED HEALTH CARE EDUCATION/TRAINING PROGRAM

## 2025-07-29 PROCEDURE — 99204 OFFICE O/P NEW MOD 45 MIN: CPT | Performed by: STUDENT IN AN ORGANIZED HEALTH CARE EDUCATION/TRAINING PROGRAM

## 2025-07-29 PROCEDURE — G8419 CALC BMI OUT NRM PARAM NOF/U: HCPCS | Performed by: STUDENT IN AN ORGANIZED HEALTH CARE EDUCATION/TRAINING PROGRAM

## 2025-07-29 PROCEDURE — 1036F TOBACCO NON-USER: CPT | Performed by: STUDENT IN AN ORGANIZED HEALTH CARE EDUCATION/TRAINING PROGRAM

## 2025-07-29 RX ORDER — ONDANSETRON 4 MG/1
4 TABLET, FILM COATED ORAL EVERY 8 HOURS PRN
COMMUNITY
End: 2025-07-29 | Stop reason: SDUPTHER

## 2025-07-29 RX ORDER — PANTOPRAZOLE SODIUM 20 MG/1
20 TABLET, DELAYED RELEASE ORAL
Qty: 90 TABLET | Refills: 1 | Status: SHIPPED | OUTPATIENT
Start: 2025-07-29

## 2025-07-29 RX ORDER — ONDANSETRON 4 MG/1
4 TABLET, FILM COATED ORAL EVERY 8 HOURS PRN
Qty: 90 TABLET | Refills: 2 | Status: SHIPPED | OUTPATIENT
Start: 2025-07-29 | End: 2025-11-26

## 2025-07-29 SDOH — ECONOMIC STABILITY: FOOD INSECURITY: WITHIN THE PAST 12 MONTHS, YOU WORRIED THAT YOUR FOOD WOULD RUN OUT BEFORE YOU GOT MONEY TO BUY MORE.: NEVER TRUE

## 2025-07-29 SDOH — ECONOMIC STABILITY: FOOD INSECURITY: WITHIN THE PAST 12 MONTHS, THE FOOD YOU BOUGHT JUST DIDN'T LAST AND YOU DIDN'T HAVE MONEY TO GET MORE.: NEVER TRUE

## 2025-07-29 ASSESSMENT — PATIENT HEALTH QUESTIONNAIRE - PHQ9
SUM OF ALL RESPONSES TO PHQ QUESTIONS 1-9: 0
1. LITTLE INTEREST OR PLEASURE IN DOING THINGS: NOT AT ALL
2. FEELING DOWN, DEPRESSED OR HOPELESS: NOT AT ALL

## 2025-07-29 ASSESSMENT — ENCOUNTER SYMPTOMS
WHEEZING: 0
SHORTNESS OF BREATH: 0
NAUSEA: 0
COUGH: 0
DIARRHEA: 0
ABDOMINAL PAIN: 1
VOMITING: 0

## 2025-07-29 NOTE — PROGRESS NOTES
reminders to display for this patient.   Health Maintenance   Topic Date Due    Depression Screen  Never done    Varicella vaccine (1 of 2 - 13+ 2-dose series) Never done    HIV screen  Never done    Hepatitis C screen  Never done    Hepatitis B vaccine (1 of 3 - 19+ 3-dose series) Never done    Pneumococcal 0-49 years Vaccine (1 of 2 - PCV) Never done    Cervical cancer screen  Never done    Diabetes screen  Never done    Lipids  Never done    Breast cancer screen  Never done    COVID-19 Vaccine (1 - 2024-25 season) Never done    Flu vaccine (1) 08/01/2025    DTaP/Tdap/Td vaccine (3 - Td or Tdap) 11/18/2027    Hepatitis A vaccine  Aged Out    Hib vaccine  Aged Out    HPV vaccine  Aged Out    Polio vaccine  Aged Out    Meningococcal (ACWY) vaccine  Aged Out    Meningococcal B vaccine  Aged Out      There are no preventive care reminders to display for this patient.   There are no preventive care reminders to display for this patient.     /82   Pulse 71   Temp 98.1 °F (36.7 °C)   Resp 18   Ht 1.6 m (5' 3\")   Wt 71.4 kg (157 lb 6.4 oz)   SpO2 98%   BMI 27.88 kg/m²     Patient Care Team:  Holly Guaman DO as PCP - General (Family Medicine)    This chart has been completed using [employer]. While attempts have been made to ensure accuracy, certain words or phrases may not be entered as intended.          An electronic signature was used to authenticate this note.    --Holly Guaman DO

## (undated) DEVICE — TOTAL KNEE PK

## (undated) DEVICE — 3M™ STERI-DRAPE™ U-DRAPE 1015: Brand: STERI-DRAPE™

## (undated) DEVICE — BNDG,ELSTC,MATRIX,STRL,6"X5YD,LF,HOOK&LP: Brand: MEDLINE

## (undated) DEVICE — APPLICATOR MEDICATED 26 CC SOLUTION HI LT ORNG CHLORAPREP

## (undated) DEVICE — SOLUTION IV IRRIG LACTATED RINGERS 3000ML 2B7487

## (undated) DEVICE — K WIRE FIX L6IN DIA0.062IN 1600662] MICROAIRE SURGICAL INSTRUMENTS INC]

## (undated) DEVICE — GOWN,SIRUS,POLYRNF,BRTHSLV,XL,30/CS: Brand: MEDLINE

## (undated) DEVICE — GOWN,SIRUS,POLYRNF,BRTHSLV,XLN/XL,20/CS: Brand: MEDLINE

## (undated) DEVICE — NEEDLE SPNL L3.5IN PNK HUB S STL REG WALL FIT STYL W/ QNCKE

## (undated) DEVICE — SUPER TURBOVAC 90 INTEGRATED CABLE WAND ICW: Brand: COBLATION

## (undated) DEVICE — PACK PROCEDURE SURG GEN CUST

## (undated) DEVICE — MAT SURG W36XL56IN GRN FOAM ANTIFATIGUE NONSLIP DRISAFE

## (undated) DEVICE — GLOVE SURG SZ 8 CRM LTX FREE POLYISOPRENE POLYMER BEAD ANTI

## (undated) DEVICE — [AGGRESSIVE PLUS CUTTER, ARTHROSCOPIC SHAVER BLADE,  DO NOT RESTERILIZE,  DO NOT USE IF PACKAGE IS DAMAGED,  KEEP DRY,  KEEP AWAY FROM SUNLIGHT]: Brand: FORMULA

## (undated) DEVICE — INTENDED FOR TISSUE SEPARATION, AND OTHER PROCEDURES THAT REQUIRE A SHARP SURGICAL BLADE TO PUNCTURE OR CUT.: Brand: BARD-PARKER ® STAINLESS STEEL BLADES

## (undated) DEVICE — GAUZE,SPONGE,4"X4",8PLY,STRL,LF,10/TRAY: Brand: MEDLINE

## (undated) DEVICE — ZIMMER® STERILE DISPOSABLE TOURNIQUET CUFF WITH PLC, DUAL PORT, SINGLE BLADDER, 30 IN. (76 CM)

## (undated) DEVICE — DRAPE,REIN 53X77,STERILE: Brand: MEDLINE

## (undated) DEVICE — NEEDLE SUT PASS FOR ROT CUF LABRAL REP MULTFI SCORPION

## (undated) DEVICE — SPONGE LAP W18XL18IN WHT COT 4 PLY FLD STRUNG RADPQ DISP ST

## (undated) DEVICE — TUBING PMP L16FT MAIN DISP FOR AR-6400 AR-6475

## (undated) DEVICE — COVER HNDL LT DISP

## (undated) DEVICE — NEEDLE FLTR 18GA L1.5IN MEM THK5UM BLNT DISP

## (undated) DEVICE — BANDAGE COMPR W6INXL12FT SMOOTH FOR LIMB EXSANG ESMARCH

## (undated) DEVICE — AGGRESSIVE PLUS, ANGLED CUTTER: Brand: FORMULA

## (undated) DEVICE — TOWEL,OR,DSP,ST,BLUE,STD,6/PK,12PK/CS: Brand: MEDLINE

## (undated) DEVICE — SPLINT KNEE UNIV FOR LESS THAN 36IN L24IN FOAM LAM E CNTCT

## (undated) DEVICE — DRESSING,GAUZE,XEROFORM,CURAD,1"X8",ST: Brand: CURAD

## (undated) DEVICE — DOUBLE BASIN SET: Brand: MEDLINE INDUSTRIES, INC.

## (undated) DEVICE — NEEDLE HYPO 18GA L1.5IN PNK POLYPR HUB S STL REG BVL STR

## (undated) DEVICE — SHOECOVER ANTI-SKID: Brand: CARDINAL HEALTH

## (undated) DEVICE — ELECTRODE PT RET AD L9FT HI MOIST COND ADH HYDRGEL CORDED

## (undated) DEVICE — 4-PORT MANIFOLD: Brand: NEPTUNE 2

## (undated) DEVICE — PADDING CAST W6INXL4YD COT LO LINTING WYTEX

## (undated) DEVICE — SYRINGE MED 30ML STD CLR PLAS LUERLOCK TIP N CTRL DISP

## (undated) DEVICE — YANKAUER,OPEN TIP,W/O VENT,STERILE: Brand: MEDLINE INDUSTRIES, INC.

## (undated) DEVICE — DRAPE,TOP,102X53,STERILE: Brand: MEDLINE

## (undated) DEVICE — COVER,MAYO STAND,STERILE: Brand: MEDLINE

## (undated) DEVICE — PAD POS ARTHSCP DISP PIVOTPOST

## (undated) DEVICE — KIT SURG W7XL11IN 2 PKT UNTREATED NA

## (undated) DEVICE — NEEDLE SUT KNEE LO PROF SCORPION

## (undated) DEVICE — TUBING, SUCTION, 9/32" X 10', STRAIGHT: Brand: MEDLINE